# Patient Record
Sex: FEMALE | Race: WHITE | Employment: UNEMPLOYED | URBAN - METROPOLITAN AREA
[De-identification: names, ages, dates, MRNs, and addresses within clinical notes are randomized per-mention and may not be internally consistent; named-entity substitution may affect disease eponyms.]

---

## 2020-11-12 ENCOUNTER — VIRTUAL VISIT (OUTPATIENT)
Dept: PRIMARY CARE CLINIC | Age: 30
End: 2020-11-12
Payer: COMMERCIAL

## 2020-11-12 DIAGNOSIS — U07.1 COVID-19: Primary | ICD-10-CM

## 2020-11-12 DIAGNOSIS — R05.9 COUGH: ICD-10-CM

## 2020-11-12 DIAGNOSIS — J01.00 ACUTE NON-RECURRENT MAXILLARY SINUSITIS: ICD-10-CM

## 2020-11-12 PROCEDURE — 99213 OFFICE O/P EST LOW 20 MIN: CPT | Performed by: NURSE PRACTITIONER

## 2020-11-12 RX ORDER — ALBUTEROL SULFATE 90 UG/1
AEROSOL, METERED RESPIRATORY (INHALATION)
COMMUNITY
End: 2020-11-12 | Stop reason: SDUPTHER

## 2020-11-12 RX ORDER — ALPRAZOLAM 0.25 MG/1
0.25 TABLET ORAL AS NEEDED
COMMUNITY
End: 2020-11-12

## 2020-11-12 RX ORDER — ALBUTEROL SULFATE 90 UG/1
AEROSOL, METERED RESPIRATORY (INHALATION)
Qty: 1 INHALER | Refills: 2 | Status: SHIPPED | OUTPATIENT
Start: 2020-11-12 | End: 2022-01-07

## 2020-11-12 RX ORDER — SERTRALINE HYDROCHLORIDE 50 MG/1
50 TABLET, FILM COATED ORAL DAILY
COMMUNITY
Start: 2020-09-25 | End: 2020-12-28

## 2020-11-12 RX ORDER — HYDROXYZINE 25 MG/1
25 TABLET, FILM COATED ORAL
COMMUNITY
End: 2021-01-18

## 2020-11-12 RX ORDER — ESCITALOPRAM OXALATE 5 MG/1
5 TABLET ORAL DAILY
COMMUNITY
End: 2020-11-12

## 2020-11-12 RX ORDER — PROMETHAZINE HYDROCHLORIDE AND DEXTROMETHORPHAN HYDROBROMIDE 6.25; 15 MG/5ML; MG/5ML
5 SYRUP ORAL
Qty: 150 ML | Refills: 0 | Status: SHIPPED | OUTPATIENT
Start: 2020-11-12 | End: 2020-11-19

## 2020-11-12 RX ORDER — AZITHROMYCIN 250 MG/1
TABLET, FILM COATED ORAL
Qty: 6 TAB | Refills: 0 | Status: SHIPPED | OUTPATIENT
Start: 2020-11-12 | End: 2020-11-17

## 2020-11-12 NOTE — PROGRESS NOTES
HISTORY OF PRESENT ILLNESS  Dav Murguia is a 27 y.o. female presents via telemedicine for sinus infection. Patient reported that she started having symptoms on 10/31 and felt like she had the flu. Patient reported she had body aches and she went to get tested and was positive for COVID. Patient reported that she has continued with sinus pressure and headaches since having COVID. Patient reported that she is continuing to cough with productive sputum that is white. Patient notes significant congestion still almost two weeks since symptoms started and shes feels the sinus pressure is getting worse. Patient reported she has been using ibuprofen for headaches. There were no vitals filed for this visit. Patient Active Problem List   Diagnosis Code    Pregnancy Z34.90     Patient Active Problem List    Diagnosis Date Noted    Pregnancy 12/26/2016     Current Outpatient Medications   Medication Sig Dispense Refill    sertraline (ZOLOFT) 50 mg tablet Take 50 mg by mouth daily.  hydrOXYzine HCL (ATARAX) 25 mg tablet Take 25 mg by mouth three (3) times daily as needed.  promethazine-dextromethorphan (PROMETHAZINE-DM) 6.25-15 mg/5 mL syrup Take 5 mL by mouth every four (4) hours as needed for Cough for up to 7 days.  150 mL 0    azithromycin (ZITHROMAX) 250 mg tablet Take 2 tablets today, then take 1 tablet daily 6 Tab 0    albuterol (Ventolin HFA) 90 mcg/actuation inhaler Use 2 puffs by inhalation every 4 hours as needed 1 Inhaler 2     Allergies   Allergen Reactions    Onion Itching     Red Onions, Makes throat tingly     Past Medical History:   Diagnosis Date    Anxiety     Asthma     Herpes simplex virus (HSV) infection      Past Surgical History:   Procedure Laterality Date    HX OTHER SURGICAL      Tonsillectomy/adnoidectomy    HX TONSILLECTOMY       Family History   Problem Relation Age of Onset    Other Other         Chronic Obstructive pulmonary diseas     Stroke Other  Heart Disease Other     Hypertension Other     Anemia Other     Anxiety Other     Diabetes Other      Social History     Tobacco Use    Smoking status: Never Smoker    Smokeless tobacco: Never Used   Substance Use Topics    Alcohol use: No           Review of Systems   Constitutional: Positive for chills and fever. HENT: Positive for congestion, sinus pain and sore throat. Respiratory: Positive for cough, sputum production and shortness of breath. Gastrointestinal: Negative. Musculoskeletal: Positive for myalgias. Neurological: Positive for headaches. Physical Exam  Constitutional:       Appearance: She is ill-appearing. HENT:      Head: Normocephalic. Nose: Congestion present. Eyes:      Conjunctiva/sclera: Conjunctivae normal.      Pupils: Pupils are equal, round, and reactive to light. Pulmonary:      Effort: Pulmonary effort is normal.   Skin:     General: Skin is warm and dry. Neurological:      Mental Status: She is alert. ASSESSMENT and PLAN  Diagnoses and all orders for this visit:    1. COVID-19  Comments:  Continue to quarentine if having symptoms. Orders:  -     promethazine-dextromethorphan (PROMETHAZINE-DM) 6.25-15 mg/5 mL syrup; Take 5 mL by mouth every four (4) hours as needed for Cough for up to 7 days. -     albuterol (Ventolin HFA) 90 mcg/actuation inhaler; Use 2 puffs by inhalation every 4 hours as needed    2. Acute non-recurrent maxillary sinusitis  Comments:  Pressure in sinuses getting worse. Will start on a zpack incase bacterial.   Patient looks miserable. Orders:  -     azithromycin (ZITHROMAX) 250 mg tablet; Take 2 tablets today, then take 1 tablet daily    3. Cough  -     promethazine-dextromethorphan (PROMETHAZINE-DM) 6.25-15 mg/5 mL syrup; Take 5 mL by mouth every four (4) hours as needed for Cough for up to 7 days.          Magui Willis, who was evaluated through a synchronous (real-time) audio-video encounter, and/or her healthcare decision maker, is aware that it is a billable service, with coverage as determined by her insurance carrier. She provided verbal consent to proceed: Yes, and patient identification was verified. It was conducted pursuant to the emergency declaration under the [address] [place] [address], [address] [place] [address] authority and the [employer] Act. A caregiver was present when appropriate. Ability to conduct physical exam was limited. I was at home. The patient was at home.         Sasha Motta NP

## 2021-01-18 ENCOUNTER — OFFICE VISIT (OUTPATIENT)
Dept: PRIMARY CARE CLINIC | Age: 31
End: 2021-01-18
Payer: COMMERCIAL

## 2021-01-18 VITALS
OXYGEN SATURATION: 96 % | SYSTOLIC BLOOD PRESSURE: 118 MMHG | WEIGHT: 156 LBS | BODY MASS INDEX: 23.11 KG/M2 | RESPIRATION RATE: 18 BRPM | TEMPERATURE: 97.8 F | HEART RATE: 76 BPM | DIASTOLIC BLOOD PRESSURE: 79 MMHG | HEIGHT: 69 IN

## 2021-01-18 DIAGNOSIS — B37.9 YEAST INFECTION: ICD-10-CM

## 2021-01-18 DIAGNOSIS — H66.003 NON-RECURRENT ACUTE SUPPURATIVE OTITIS MEDIA OF BOTH EARS WITHOUT SPONTANEOUS RUPTURE OF TYMPANIC MEMBRANES: Primary | ICD-10-CM

## 2021-01-18 PROCEDURE — 99213 OFFICE O/P EST LOW 20 MIN: CPT | Performed by: NURSE PRACTITIONER

## 2021-01-18 RX ORDER — AMOXICILLIN AND CLAVULANATE POTASSIUM 875; 125 MG/1; MG/1
1 TABLET, FILM COATED ORAL EVERY 12 HOURS
Qty: 20 TAB | Refills: 0 | Status: SHIPPED | OUTPATIENT
Start: 2021-01-18 | End: 2021-01-28

## 2021-01-18 RX ORDER — FLUCONAZOLE 150 MG/1
150 TABLET ORAL DAILY
Qty: 1 TAB | Refills: 0 | Status: SHIPPED | OUTPATIENT
Start: 2021-01-18 | End: 2021-01-19
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
## 2021-01-29 ENCOUNTER — HOSPITAL ENCOUNTER (EMERGENCY)
Age: 31
Discharge: HOME | End: 2021-01-29
Attending: EMERGENCY MEDICINE
Payer: COMMERCIAL

## 2021-01-29 ENCOUNTER — APPOINTMENT (OUTPATIENT)
Dept: CT IMAGING | Age: 31
End: 2021-01-29
Attending: EMERGENCY MEDICINE
Payer: COMMERCIAL

## 2021-01-29 VITALS
WEIGHT: 155 LBS | DIASTOLIC BLOOD PRESSURE: 82 MMHG | HEART RATE: 87 BPM | RESPIRATION RATE: 18 BRPM | SYSTOLIC BLOOD PRESSURE: 109 MMHG | HEIGHT: 69 IN | OXYGEN SATURATION: 98 % | TEMPERATURE: 97.7 F | BODY MASS INDEX: 22.96 KG/M2

## 2021-01-29 DIAGNOSIS — H10.32 ACUTE CONJUNCTIVITIS OF LEFT EYE, UNSPECIFIED ACUTE CONJUNCTIVITIS TYPE: Primary | ICD-10-CM

## 2021-01-29 DIAGNOSIS — H01.004 BLEPHARITIS OF LEFT UPPER EYELID, UNSPECIFIED TYPE: ICD-10-CM

## 2021-01-29 LAB
ANION GAP SERPL CALC-SCNC: 5 MMOL/L (ref 5–15)
BASOPHILS # BLD: 0 K/UL (ref 0–0.1)
BASOPHILS NFR BLD: 0 % (ref 0–1)
BUN SERPL-MCNC: 12 MG/DL (ref 6–20)
BUN/CREAT SERPL: 13 (ref 12–20)
CALCIUM SERPL-MCNC: 8.8 MG/DL (ref 8.5–10.1)
CHLORIDE SERPL-SCNC: 107 MMOL/L (ref 97–108)
CO2 SERPL-SCNC: 28 MMOL/L (ref 21–32)
COMMENT, HOLDF: NORMAL
CREAT SERPL-MCNC: 0.94 MG/DL (ref 0.55–1.02)
DIFFERENTIAL METHOD BLD: ABNORMAL
EOSINOPHIL # BLD: 0.1 K/UL (ref 0–0.4)
EOSINOPHIL NFR BLD: 1 % (ref 0–7)
ERYTHROCYTE [DISTWIDTH] IN BLOOD BY AUTOMATED COUNT: 13.9 % (ref 11.5–14.5)
GLUCOSE SERPL-MCNC: 77 MG/DL (ref 65–100)
HCG UR QL: NEGATIVE
HCT VFR BLD AUTO: 41.1 % (ref 35–47)
HGB BLD-MCNC: 13.5 G/DL (ref 11.5–16)
IMM GRANULOCYTES # BLD AUTO: 0 K/UL (ref 0–0.04)
IMM GRANULOCYTES NFR BLD AUTO: 0 % (ref 0–0.5)
LACTATE SERPL-SCNC: 1 MMOL/L (ref 0.4–2)
LYMPHOCYTES # BLD: 1.1 K/UL (ref 0.8–3.5)
LYMPHOCYTES NFR BLD: 16 % (ref 12–49)
MCH RBC QN AUTO: 32.8 PG (ref 26–34)
MCHC RBC AUTO-ENTMCNC: 32.8 G/DL (ref 30–36.5)
MCV RBC AUTO: 100 FL (ref 80–99)
MONOCYTES # BLD: 0.6 K/UL (ref 0–1)
MONOCYTES NFR BLD: 8 % (ref 5–13)
NEUTS SEG # BLD: 5.3 K/UL (ref 1.8–8)
NEUTS SEG NFR BLD: 75 % (ref 32–75)
NRBC # BLD: 0 K/UL (ref 0–0.01)
NRBC BLD-RTO: 0 PER 100 WBC
PLATELET # BLD AUTO: 135 K/UL (ref 150–400)
PMV BLD AUTO: 10.7 FL (ref 8.9–12.9)
POTASSIUM SERPL-SCNC: 3.9 MMOL/L (ref 3.5–5.1)
RBC # BLD AUTO: 4.11 M/UL (ref 3.8–5.2)
RBC MORPH BLD: ABNORMAL
SAMPLES BEING HELD,HOLD: NORMAL
SODIUM SERPL-SCNC: 140 MMOL/L (ref 136–145)
WBC # BLD AUTO: 7.1 K/UL (ref 3.6–11)

## 2021-01-29 PROCEDURE — 80048 BASIC METABOLIC PNL TOTAL CA: CPT

## 2021-01-29 PROCEDURE — 36415 COLL VENOUS BLD VENIPUNCTURE: CPT

## 2021-01-29 PROCEDURE — 85025 COMPLETE CBC W/AUTO DIFF WBC: CPT

## 2021-01-29 PROCEDURE — 99284 EMERGENCY DEPT VISIT MOD MDM: CPT

## 2021-01-29 PROCEDURE — 96365 THER/PROPH/DIAG IV INF INIT: CPT

## 2021-01-29 PROCEDURE — 70481 CT ORBIT/EAR/FOSSA W/DYE: CPT

## 2021-01-29 PROCEDURE — 81025 URINE PREGNANCY TEST: CPT

## 2021-01-29 PROCEDURE — 74011250636 HC RX REV CODE- 250/636: Performed by: EMERGENCY MEDICINE

## 2021-01-29 PROCEDURE — 83605 ASSAY OF LACTIC ACID: CPT

## 2021-01-29 PROCEDURE — 74011000636 HC RX REV CODE- 636: Performed by: RADIOLOGY

## 2021-01-29 PROCEDURE — 96368 THER/DIAG CONCURRENT INF: CPT

## 2021-01-29 PROCEDURE — 96375 TX/PRO/DX INJ NEW DRUG ADDON: CPT

## 2021-01-29 PROCEDURE — 74011000258 HC RX REV CODE- 258: Performed by: EMERGENCY MEDICINE

## 2021-01-29 RX ORDER — FAMOTIDINE 10 MG/ML
20 INJECTION INTRAVENOUS
Status: COMPLETED | OUTPATIENT
Start: 2021-01-29 | End: 2021-01-29

## 2021-01-29 RX ORDER — DIPHENHYDRAMINE HYDROCHLORIDE 50 MG/ML
INJECTION, SOLUTION INTRAMUSCULAR; INTRAVENOUS
Status: DISCONTINUED
Start: 2021-01-29 | End: 2021-01-29 | Stop reason: HOSPADM

## 2021-01-29 RX ORDER — DIPHENHYDRAMINE HYDROCHLORIDE 50 MG/ML
25 INJECTION, SOLUTION INTRAMUSCULAR; INTRAVENOUS
Status: COMPLETED | OUTPATIENT
Start: 2021-01-29 | End: 2021-01-29

## 2021-01-29 RX ORDER — POLYMYXIN B SULFATE AND TRIMETHOPRIM 1; 10000 MG/ML; [USP'U]/ML
1 SOLUTION OPHTHALMIC EVERY 4 HOURS
Qty: 10 ML | Refills: 0 | Status: SHIPPED | OUTPATIENT
Start: 2021-01-29 | End: 2021-02-05

## 2021-01-29 RX ADMIN — DIPHENHYDRAMINE HYDROCHLORIDE 25 MG: 50 INJECTION, SOLUTION INTRAMUSCULAR; INTRAVENOUS at 14:01

## 2021-01-29 RX ADMIN — IOPAMIDOL 100 ML: 612 INJECTION, SOLUTION INTRAVENOUS at 14:22

## 2021-01-29 RX ADMIN — VANCOMYCIN HYDROCHLORIDE 1750 MG: 5 INJECTION, POWDER, LYOPHILIZED, FOR SOLUTION INTRAVENOUS at 13:23

## 2021-01-29 RX ADMIN — FAMOTIDINE 20 MG: 10 INJECTION INTRAVENOUS at 14:08

## 2021-01-29 RX ADMIN — AMPICILLIN AND SULBACTAM 3 G: 1; 2 INJECTION, POWDER, FOR SOLUTION INTRAMUSCULAR; INTRAVENOUS at 12:46

## 2021-01-29 NOTE — ED TRIAGE NOTES
Pt sent by optometrist for possible orbital cellulitis. Pt states 3 days ago started with irritation and swelling to left eyelid. Eyelid is red and swollen. Eye movement is painful. Pt has been taking antibx for double ear infection for last 8 days.

## 2021-01-29 NOTE — ED NOTES
Patient reports head itching, facial redness noted. Denies any shortness of breath. Vancomycin stopped and line flushed. Dr. Swapna Lamas to bedside.

## 2021-01-29 NOTE — ED PROVIDER NOTES
Patient is a 43-year-old female with history of asthma presenting to emergency department for evaluation of left eye pain and redness with onset 3 days ago. Patient was seen by her optometrist prior to arrival and referred here for evaluation of possible orbital cellulitis. Patient states that for the past 8 days she has been taking Augmentin for treatment of bilateral ear infection, she did have a complication of a left tympanic membrane perforation. She is experiencing pain in the left eyelid with pain with eye movement and left-sided blurred vision. She denies fever, chills, sweats, facial pain, or any other medical complaints at this time. Please note that this dictation was completed with One[employer], the computer voice recognition software.  Quite often unanticipated grammatical, syntax, homophones, and other interpretive errors are inadvertently transcribed by the computer software.  Please disregard these errors.  Please excuse any errors that have escaped final proofreading.              Past Medical History:   Diagnosis Date    Anxiety     Asthma     Headache     Herpes simplex virus (HSV) infection        Past Surgical History:   Procedure Laterality Date    HX OTHER SURGICAL      Tonsillectomy/adnoidectomy    HX TONSILLECTOMY           Family History:   Problem Relation Age of Onset    Other Other         Chronic Obstructive pulmonary diseas     Stroke Other     Heart Disease Other     Hypertension Other     Anemia Other     Anxiety Other     Diabetes Other        Social History     Socioeconomic History    Marital status:      Spouse name: Not on file    Number of children: Not on file    Years of education: Not on file    Highest education level: Not on file   Occupational History    Not on file   Social Needs    Financial resource strain: Not on file    Food insecurity     Worry: Not on file     Inability: Not on file    Transportation needs     Medical: Not on file Non-medical: Not on file   Tobacco Use    Smoking status: Current Every Day Smoker     Packs/day: 6.00     Types: Cigarettes    Smokeless tobacco: Never Used   Substance and Sexual Activity    Alcohol use: No    Drug use: No    Sexual activity: Yes     Partners: Male   Lifestyle    Physical activity     Days per week: Not on file     Minutes per session: Not on file    Stress: Not on file   Relationships    Social connections     Talks on phone: Not on file     Gets together: Not on file     Attends Confucianism service: Not on file     Active member of club or organization: Not on file     Attends meetings of clubs or organizations: Not on file     Relationship status: Not on file    Intimate partner violence     Fear of current or ex partner: Not on file     Emotionally abused: Not on file     Physically abused: Not on file     Forced sexual activity: Not on file   Other Topics Concern    Not on file   Social History Narrative    Not on file         ALLERGIES: Onion    Review of Systems   Constitutional: Negative for chills and fever. HENT: Negative for ear pain and sore throat. Eyes: Positive for pain and visual disturbance. Respiratory: Negative for cough and shortness of breath. Cardiovascular: Negative for chest pain. Gastrointestinal: Negative for abdominal pain. Genitourinary: Negative for flank pain. Musculoskeletal: Negative for back pain. Skin: Negative for color change. Neurological: Negative for dizziness and headaches. Psychiatric/Behavioral: Negative for confusion. Vitals:    01/29/21 1212   BP: 115/80   Pulse: 87   Resp: 18   Temp: 97.7 °F (36.5 °C)   SpO2: 99%   Weight: 70.3 kg (155 lb)   Height: 5' 9\" (1.753 m)            Physical Exam  Vitals signs and nursing note reviewed. Constitutional:       General: She is not in acute distress. Appearance: Normal appearance. She is not ill-appearing. HENT:      Head: Normocephalic and atraumatic.       Right Ear: Hearing, tympanic membrane, ear canal and external ear normal. Tympanic membrane is not erythematous. Left Ear: Hearing, tympanic membrane, ear canal and external ear normal. Tympanic membrane is not erythematous. Mouth/Throat:      Pharynx: Oropharynx is clear. Eyes:      General: Vision grossly intact. Extraocular Movements: Extraocular movements intact. Left eye: Abnormal extraocular motion (EOM intact, pain with eye movement in all directions) present. Conjunctiva/sclera:      Right eye: Right conjunctiva is not injected. No hemorrhage. Left eye: Left conjunctiva is injected. No hemorrhage. Neck:      Musculoskeletal: No neck rigidity. Cardiovascular:      Rate and Rhythm: Normal rate and regular rhythm. Pulmonary:      Effort: Pulmonary effort is normal.      Breath sounds: Normal breath sounds. Abdominal:      Palpations: Abdomen is soft. Tenderness: There is no abdominal tenderness. Musculoskeletal: Normal range of motion. Skin:     General: Skin is warm and dry. Neurological:      General: No focal deficit present. Mental Status: She is alert and oriented to person, place, and time. Psychiatric:         Mood and Affect: Mood normal.          MDM  Number of Diagnoses or Management Options  Acute conjunctivitis of left eye, unspecified acute conjunctivitis type  Blepharitis of left upper eyelid, unspecified type  Diagnosis management comments: Patient is alert, afebrile, vitals stable. Recent treatment for bilateral otitis media and development of left eye pain, swelling, and pain with eye movement over the past 3 days. Seen ophthalmology prior to arrival and referred to emergency department for IV antibiotics and work-up for orbital cellulitis. On exam she has well demarcated erythema to the left upper eyelid with surrounding swelling, extraocular motions intact and painful. Visual acuity checked, within normal limits.   No signs of continued otitis media bilaterally. Differential diagnosis includes orbital cellulitis, preseptal cellulitis, conjunctivitis. Plan to obtain lab work and imaging of the orbits and start IV Unasyn and vancomycin. 2:28 PM  PT developed \"red man\" syndrome with IV vancomycin. Patient given IV Benadryl and Pepcid with relief in symptoms. No signs of anaphylaxis. Plan for continuation of medication once her symptoms resolve at a slower infusion rate. 4:26 PM  Patient lab work unremarkable, no leukocytosis, lactate normal.  CT of the orbits does not show any signs of preseptal or orbital cellulitis, her symptoms are likely consistent with conjunctivitis with likely overlying blepharitis. Patient informed of findings and is discharged home. She has erythromycin ointment already prescribed, which she will continue to the lids, and add on eyedrops for treatment of her treat conjunctivitis. She will follow-up with ophthalmology. Return precautions outlined. All questions answered at this time. Discussed patient with ED attending Mimi Akhtar MD who agrees with current management plan. ED Course as of Jan 29 1623 Fri Jan 29, 2021   1248 Pregnancy test,urine (POC): Negative [EP]   1353 CBC WITH AUTOMATED DIFF(!):    WBC 7.1   RBC 4.11   HGB 13.5   HCT 41.1   .0(!)   MCH 32.8   MCHC 32.8   RDW 13.9   PLATELET 246(!)   MPV 10.7   NRBC 0.0   ABSOLUTE NRBC 0.00   NEUTROPHILS 75   LYMPHOCYTES 16   MONOCYTES 8   EOSINOPHILS 1   BASOPHILS 0   IMMATURE GRANULOCYTES 0   ABS. NEUTROPHILS 5.3   ABS. LYMPHOCYTES 1.1   ABS. MONOCYTES 0.6   ABS. EOSINOPHILS 0.1   ABS. BASOPHILS 0.0   ABS. IMM.  GRANS. 0.0   DF SMEAR SCANNED   RBC COMMENTS MACROCYTOSIS  1+   [EP]   4928 METABOLIC PANEL, BASIC:    Sodium 140   Potassium 3.9   Chloride 107   CO2 28   Anion gap 5   Glucose 77   BUN 12   Creatinine 0.94   BUN/Creatinine ratio 13   GFR est AA >60   GFR est non-AA >60   Calcium 8.8 [EP]   1353 Lactic acid: 1.0 [EP]   9928 IMPRESSION  Left conjunctivitis. CT ORBIT POST FOSSA W CONT [EP]      ED Course User Index  [EP] TAYLOR Meek     4:27 PM  Pt has been reevaluated. There are no new complaints, changes, or physical findings at this time. Medications have been reviewed w/ pt and/or family. Pt and/or family's questions have been answered. Pt and/or family expressed good understanding of the dx/tx/rx and is in agreement with plan of care. Pt instructed and agreed to f/u w/ optho and to return to ED upon further deterioration. Pt is ready for discharge. IMPRESSION:  1. Acute conjunctivitis of left eye, unspecified acute conjunctivitis type    2. Blepharitis of left upper eyelid, unspecified type        PLAN:  1. Current Discharge Medication List      START taking these medications    Details   trimethoprim-polymyxin b (POLYTRIM) ophthalmic solution Administer 1 Drop to both eyes every four (4) hours for 7 days. Qty: 10 mL, Refills: 0           2.    Follow-up Information     Follow up With Specialties Details Why [address] [place] [address]  Schedule an appointment as soon as possible for a visit   [facility]  [phone]    Martinez Yuan NP Nurse Practitioner Schedule an appointment as soon as possible for a visit   [phone]              Return to ED if worse     Procedures

## 2021-02-11 DIAGNOSIS — B37.9 YEAST INFECTION: Primary | ICD-10-CM

## 2021-02-11 RX ORDER — FLUCONAZOLE 150 MG/1
150 TABLET ORAL DAILY
Qty: 1 TAB | Refills: 0 | Status: SHIPPED | OUTPATIENT
Start: 2021-02-11 | End: 2021-02-12

## 2021-03-01 ENCOUNTER — PATIENT MESSAGE (OUTPATIENT)
Dept: PRIMARY CARE CLINIC | Age: 31
End: 2021-03-01

## 2021-03-01 DIAGNOSIS — F41.1 GENERALIZED ANXIETY DISORDER: Primary | ICD-10-CM

## 2021-03-01 RX ORDER — HYDROXYZINE PAMOATE 25 MG/1
25 CAPSULE ORAL
Qty: 30 CAP | Refills: 2 | Status: SHIPPED | OUTPATIENT
Start: 2021-03-01 | End: 2022-11-03 | Stop reason: ALTCHOICE
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
## 2021-03-07 ENCOUNTER — HOSPITAL ENCOUNTER (EMERGENCY)
Age: 31
Discharge: HOME | End: 2021-03-07
Attending: EMERGENCY MEDICINE
Payer: COMMERCIAL

## 2021-03-07 ENCOUNTER — APPOINTMENT (OUTPATIENT)
Dept: CT IMAGING | Age: 31
End: 2021-03-07
Attending: EMERGENCY MEDICINE
Payer: COMMERCIAL

## 2021-03-07 ENCOUNTER — APPOINTMENT (OUTPATIENT)
Dept: GENERAL RADIOLOGY | Age: 31
End: 2021-03-07
Attending: EMERGENCY MEDICINE
Payer: COMMERCIAL

## 2021-03-07 VITALS
DIASTOLIC BLOOD PRESSURE: 71 MMHG | OXYGEN SATURATION: 99 % | TEMPERATURE: 97.4 F | WEIGHT: 157 LBS | RESPIRATION RATE: 16 BRPM | SYSTOLIC BLOOD PRESSURE: 125 MMHG | HEART RATE: 91 BPM | HEIGHT: 69 IN | BODY MASS INDEX: 23.25 KG/M2

## 2021-03-07 DIAGNOSIS — S63.502A SPRAIN OF LEFT WRIST, INITIAL ENCOUNTER: Primary | ICD-10-CM

## 2021-03-07 DIAGNOSIS — R42 VERTIGO: ICD-10-CM

## 2021-03-07 DIAGNOSIS — S06.0X0A CONCUSSION WITHOUT LOSS OF CONSCIOUSNESS, INITIAL ENCOUNTER: ICD-10-CM

## 2021-03-07 PROCEDURE — 73110 X-RAY EXAM OF WRIST: CPT

## 2021-03-07 PROCEDURE — 70450 CT HEAD/BRAIN W/O DYE: CPT

## 2021-03-07 PROCEDURE — 99284 EMERGENCY DEPT VISIT MOD MDM: CPT

## 2021-03-07 RX ORDER — MECLIZINE HYDROCHLORIDE 25 MG/1
25 TABLET ORAL
Qty: 20 TAB | Refills: 0 | Status: SHIPPED | OUTPATIENT
Start: 2021-03-07 | End: 2021-03-17
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
## 2021-03-07 NOTE — ED NOTES
Pt was discharged and given instructions by Dr Edna Ramirez . Pt verbalized good understanding of all discharge instructions,prescriptions and F/U care. All questions answered. Pt in stable condition on discharge.
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
## 2021-05-09 LAB — SARS-COV-2, NAA: NOT DETECTED

## 2021-07-24 ENCOUNTER — APPOINTMENT (OUTPATIENT)
Dept: ULTRASOUND IMAGING | Age: 31
End: 2021-07-24
Attending: FAMILY MEDICINE
Payer: COMMERCIAL

## 2021-07-24 ENCOUNTER — HOSPITAL ENCOUNTER (EMERGENCY)
Age: 31
Discharge: HOME | End: 2021-07-24
Attending: FAMILY MEDICINE
Payer: COMMERCIAL

## 2021-07-24 VITALS
RESPIRATION RATE: 18 BRPM | DIASTOLIC BLOOD PRESSURE: 84 MMHG | TEMPERATURE: 99 F | BODY MASS INDEX: 22.96 KG/M2 | OXYGEN SATURATION: 98 % | WEIGHT: 155 LBS | HEART RATE: 100 BPM | SYSTOLIC BLOOD PRESSURE: 119 MMHG | HEIGHT: 69 IN

## 2021-07-24 DIAGNOSIS — R10.2 PELVIC PAIN: Primary | ICD-10-CM

## 2021-07-24 DIAGNOSIS — Z34.90 INTRAUTERINE PREGNANCY: ICD-10-CM

## 2021-07-24 LAB
ALBUMIN SERPL-MCNC: 3.7 G/DL (ref 3.5–5)
ALBUMIN/GLOB SERPL: 1.2 {RATIO} (ref 1.1–2.2)
ALP SERPL-CCNC: 68 U/L (ref 45–117)
ALT SERPL-CCNC: 28 U/L (ref 12–78)
ANION GAP SERPL CALC-SCNC: 11 MMOL/L (ref 5–15)
APPEARANCE UR: CLEAR
AST SERPL W P-5'-P-CCNC: 16 U/L (ref 15–37)
BACTERIA URNS QL MICRO: ABNORMAL /HPF
BASOPHILS # BLD: 0 K/UL (ref 0–0.1)
BASOPHILS NFR BLD: 0 % (ref 0–1)
BILIRUB SERPL-MCNC: 0.3 MG/DL (ref 0.2–1)
BILIRUB UR QL: NEGATIVE
BUN SERPL-MCNC: 12 MG/DL (ref 6–20)
BUN/CREAT SERPL: 12 (ref 12–20)
CA-I BLD-MCNC: 8.2 MG/DL (ref 8.5–10.1)
CHLORIDE SERPL-SCNC: 106 MMOL/L (ref 97–108)
CO2 SERPL-SCNC: 24 MMOL/L (ref 21–32)
COLOR UR: YELLOW
CREAT SERPL-MCNC: 1.02 MG/DL (ref 0.55–1.02)
DIFFERENTIAL METHOD BLD: ABNORMAL
EOSINOPHIL # BLD: 0.1 K/UL (ref 0–0.4)
EOSINOPHIL NFR BLD: 1 % (ref 0–7)
EPITH CASTS URNS QL MICRO: ABNORMAL /LPF
ERYTHROCYTE [DISTWIDTH] IN BLOOD BY AUTOMATED COUNT: 13.4 % (ref 11.5–14.5)
GLOBULIN SER CALC-MCNC: 3 G/DL (ref 2–4)
GLUCOSE SERPL-MCNC: 104 MG/DL (ref 65–100)
GLUCOSE UR STRIP.AUTO-MCNC: NEGATIVE MG/DL
HCG SERPL-ACNC: 6154.5 MIU/ML (ref 0–6)
HCG UR QL: POSITIVE
HCT VFR BLD AUTO: 37.5 % (ref 35–47)
HGB BLD-MCNC: 12.4 G/DL (ref 11.5–16)
HGB UR QL STRIP: ABNORMAL
IMM GRANULOCYTES # BLD AUTO: 0 K/UL (ref 0–0.04)
IMM GRANULOCYTES NFR BLD AUTO: 0 % (ref 0–0.5)
KETONES UR QL STRIP.AUTO: NEGATIVE MG/DL
LEUKOCYTE ESTERASE UR QL STRIP.AUTO: NEGATIVE
LIPASE SERPL-CCNC: 122 U/L (ref 73–393)
LYMPHOCYTES # BLD: 1.3 K/UL (ref 0.8–3.5)
LYMPHOCYTES NFR BLD: 20 % (ref 12–49)
MCH RBC QN AUTO: 33.3 PG (ref 26–34)
MCHC RBC AUTO-ENTMCNC: 33.1 G/DL (ref 30–36.5)
MCV RBC AUTO: 100.8 FL (ref 80–99)
MONOCYTES # BLD: 0.5 K/UL (ref 0–1)
MONOCYTES NFR BLD: 8 % (ref 5–13)
NEUTS SEG # BLD: 4.5 K/UL (ref 1.8–8)
NEUTS SEG NFR BLD: 71 % (ref 32–75)
NITRITE UR QL STRIP.AUTO: NEGATIVE
PH UR STRIP: 5 [PH] (ref 5–8)
PLATELET # BLD AUTO: 128 K/UL (ref 150–400)
PMV BLD AUTO: 10.2 FL (ref 8.9–12.9)
POTASSIUM SERPL-SCNC: 3.7 MMOL/L (ref 3.5–5.1)
PROT SERPL-MCNC: 6.7 G/DL (ref 6.4–8.2)
PROT UR STRIP-MCNC: NEGATIVE MG/DL
RBC # BLD AUTO: 3.72 M/UL (ref 3.8–5.2)
RBC #/AREA URNS HPF: ABNORMAL /HPF (ref 0–5)
SODIUM SERPL-SCNC: 141 MMOL/L (ref 136–145)
SP GR UR REFRACTOMETRY: 1.02 (ref 1–1.03)
UROBILINOGEN UR QL STRIP.AUTO: 0.1 EU/DL (ref 0.2–1)
WBC # BLD AUTO: 6.4 K/UL (ref 3.6–11)
WBC URNS QL MICRO: ABNORMAL /HPF (ref 0–4)

## 2021-07-24 PROCEDURE — 99283 EMERGENCY DEPT VISIT LOW MDM: CPT

## 2021-07-24 PROCEDURE — 76801 OB US < 14 WKS SINGLE FETUS: CPT

## 2021-07-24 PROCEDURE — 87086 URINE CULTURE/COLONY COUNT: CPT

## 2021-07-24 PROCEDURE — 83690 ASSAY OF LIPASE: CPT

## 2021-07-24 PROCEDURE — 76817 TRANSVAGINAL US OBSTETRIC: CPT

## 2021-07-24 PROCEDURE — 85025 COMPLETE CBC W/AUTO DIFF WBC: CPT

## 2021-07-24 PROCEDURE — 80053 COMPREHEN METABOLIC PANEL: CPT

## 2021-07-24 PROCEDURE — 81001 URINALYSIS AUTO W/SCOPE: CPT

## 2021-07-24 PROCEDURE — 84702 CHORIONIC GONADOTROPIN TEST: CPT

## 2021-07-24 PROCEDURE — 81025 URINE PREGNANCY TEST: CPT

## 2021-07-24 NOTE — ED PROVIDER NOTES
EMERGENCY DEPARTMENT HISTORY AND PHYSICAL EXAM      Date: 7/24/2021  Patient Name: Alverto Rivera    History of Presenting Illness     Chief complaint lower abdominal pain. History Provided By:     HPI: Alverto Rivera, is an extremely pleasant 32 y.o. female presenting to the ED with a chief complaint of left lower abdominal pain and nausea. She states she is pregnant. Her last period was approximately 6 weeks ago. She states her abdominal pain is waxing and waning. It is achy. She denies fevers, chills no rigors. No vomiting or diarrhea. No vaginal discharge. No dysuria but experiencing urinary frequency. No flank pain. She has been eating and drinking less than usual.  She had an episode of lightheadedness however that resolved. No chest pain, shortness of breath or palpitations. No swelling of calves. There are no other complaints, changes, or physical findings at this time. PCP: Dereck Bosch NP    No current facility-administered medications on file prior to encounter. Current Outpatient Medications on File Prior to Encounter   Medication Sig Dispense Refill    sertraline (ZOLOFT) 50 mg tablet Take 1 Tab by mouth daily. 90 Tab 1    albuterol (Ventolin HFA) 90 mcg/actuation inhaler Use 2 puffs by inhalation every 4 hours as needed 1 Inhaler 2    hydrOXYzine pamoate (VISTARIL) 25 mg capsule Take 1 Cap by mouth three (3) times daily as needed for Anxiety.  (Patient not taking: Reported on 7/24/2021) 30 Cap 2       Past History     Past Medical History:  Past Medical History:   Diagnosis Date    Anxiety     Asthma     Headache     Herpes simplex virus (HSV) infection        Past Surgical History:  Past Surgical History:   Procedure Laterality Date    HX OTHER SURGICAL      Tonsillectomy/adnoidectomy    HX TONSILLECTOMY         Family History:  Family History   Problem Relation Age of Onset    Other Other         Chronic Obstructive pulmonary diseas     Stroke Other  Heart Disease Other     Hypertension Other     Anemia Other     Anxiety Other     Diabetes Other        Social History:  Social History     Tobacco Use    Smoking status: Current Every Day Smoker     Packs/day: 0.25     Types: Cigarettes    Smokeless tobacco: Never Used   Vaping Use    Vaping Use: Never used   Substance Use Topics    Alcohol use: No    Drug use: No       Allergies: Allergies   Allergen Reactions    Onion Itching     Red Onions, Makes throat tingly    Vancomycin Itching     Itching and facial redness           Review of Systems     Review of Systems   Constitutional: Positive for appetite change. Negative for activity change, chills, fatigue and fever. HENT: Negative for congestion and sore throat. Eyes: Negative for photophobia and visual disturbance. Respiratory: Negative for cough, shortness of breath and wheezing. Cardiovascular: Negative for chest pain, palpitations and leg swelling. Gastrointestinal: Positive for abdominal pain. Negative for diarrhea, nausea and vomiting. Endocrine: Negative for cold intolerance and heat intolerance. Genitourinary: Positive for frequency. Musculoskeletal: Negative for gait problem and joint swelling. Skin: Negative for color change and rash. Neurological: Negative for dizziness and headaches. Physical Exam     Physical Exam  Constitutional:       Appearance: She is well-developed. HENT:      Head: Normocephalic and atraumatic. Mouth/Throat:      Pharynx: Oropharynx is clear. Comments: Dry mucous membranes  Eyes:      Conjunctiva/sclera: Conjunctivae normal.      Pupils: Pupils are equal, round, and reactive to light. Cardiovascular:      Rate and Rhythm: Normal rate and regular rhythm. Heart sounds: No murmur heard. Pulmonary:      Effort: No respiratory distress. Breath sounds: No stridor. No wheezing, rhonchi or rales. Abdominal:      General: There is no distension.       Tenderness: There is no abdominal tenderness. There is no rebound. Comments: Mild left lower quadrant tenderness with palpation. No rebound nor guarding. Musculoskeletal:      Cervical back: Normal range of motion and neck supple. Skin:     General: Skin is warm and dry. Neurological:      General: No focal deficit present. Mental Status: She is alert and oriented to person, place, and time. Psychiatric:         Mood and Affect: Mood normal.         Behavior: Behavior normal.         Lab and Diagnostic Study Results     Labs -     Recent Results (from the past 12 hour(s))   URINALYSIS W/ RFLX MICROSCOPIC    Collection Time: 07/24/21  7:15 PM   Result Value Ref Range    Color Yellow      Appearance Clear Clear      Specific gravity 1.020 1.003 - 1.030      pH (UA) 5.0 5.0 - 8.0      Protein Negative Negative mg/dL    Glucose Negative Negative mg/dL    Ketone Negative Negative mg/dL    Bilirubin Negative Negative      Blood Small (A) Negative      Urobilinogen 0.1 (L) 0.2 - 1.0 EU/dL    Nitrites Negative Negative      Leukocyte Esterase Negative Negative     URINE MICROSCOPIC    Collection Time: 07/24/21  7:15 PM   Result Value Ref Range    WBC 0-4 0 - 4 /hpf    RBC 0-5 0 - 5 /hpf    Epithelial cells Few Few /lpf    Bacteria 1+ (A) Negative /hpf   HCG URINE, QL    Collection Time: 07/24/21  7:25 PM   Result Value Ref Range    HCG urine, QL Positive (A) Negative     CBC WITH AUTOMATED DIFF    Collection Time: 07/24/21  7:45 PM   Result Value Ref Range    WBC 6.4 3.6 - 11.0 K/uL    RBC 3.72 (L) 3.80 - 5.20 M/uL    HGB 12.4 11.5 - 16.0 g/dL    HCT 37.5 35.0 - 47.0 %    .8 (H) 80.0 - 99.0 FL    MCH 33.3 26.0 - 34.0 PG    MCHC 33.1 30.0 - 36.5 g/dL    RDW 13.4 11.5 - 14.5 %    PLATELET 909 (L) 497 - 400 K/uL    MPV 10.2 8.9 - 12.9 FL    NEUTROPHILS 71 32 - 75 %    LYMPHOCYTES 20 12 - 49 %    MONOCYTES 8 5 - 13 %    EOSINOPHILS 1 0 - 7 %    BASOPHILS 0 0 - 1 %    IMMATURE GRANULOCYTES 0 0.0 - 0.5 %    ABS. NEUTROPHILS 4.5 1.8 - 8.0 K/UL    ABS. LYMPHOCYTES 1.3 0.8 - 3.5 K/UL    ABS. MONOCYTES 0.5 0.0 - 1.0 K/UL    ABS. EOSINOPHILS 0.1 0.0 - 0.4 K/UL    ABS. BASOPHILS 0.0 0.0 - 0.1 K/UL    ABS. IMM. GRANS. 0.0 0.00 - 0.04 K/UL    DF AUTOMATED     METABOLIC PANEL, COMPREHENSIVE    Collection Time: 07/24/21  7:45 PM   Result Value Ref Range    Sodium 141 136 - 145 mmol/L    Potassium 3.7 3.5 - 5.1 mmol/L    Chloride 106 97 - 108 mmol/L    CO2 24 21 - 32 mmol/L    Anion gap 11 5 - 15 mmol/L    Glucose 104 (H) 65 - 100 mg/dL    BUN 12 6 - 20 mg/dL    Creatinine 1.02 0.55 - 1.02 mg/dL    BUN/Creatinine ratio 12 12 - 20      GFR est AA >60 >60 ml/min/1.73m2    GFR est non-AA >60 >60 ml/min/1.73m2    Calcium 8.2 (L) 8.5 - 10.1 mg/dL    Bilirubin, total 0.3 0.2 - 1.0 mg/dL    AST (SGOT) 16 15 - 37 U/L    ALT (SGPT) 28 12 - 78 U/L    Alk. phosphatase 68 45 - 117 U/L    Protein, total 6.7 6.4 - 8.2 g/dL    Albumin 3.7 3.5 - 5.0 g/dL    Globulin 3.0 2.0 - 4.0 g/dL    A-G Ratio 1.2 1.1 - 2.2     LIPASE    Collection Time: 07/24/21  7:45 PM   Result Value Ref Range    Lipase 122 73 - 393 U/L   BETA HCG, QT    Collection Time: 07/24/21  7:45 PM   Result Value Ref Range    Beta HCG, QT 6,154.5 (H) 0 - 6 mIU/mL       Radiologic Studies -   @lastxrresult@  CT Results  (Last 48 hours)    None        CXR Results  (Last 48 hours)    None            Medical Decision Making   - I am the first provider for this patient. - I reviewed the vital signs, available nursing notes, past medical history, past surgical history, family history and social history. - Initial assessment performed. The patients presenting problems have been discussed, and they are in agreement with the care plan formulated and outlined with them. I have encouraged them to ask questions as they arise throughout their visit. Vital Signs-Reviewed the patient's vital signs.   Patient Vitals for the past 12 hrs:   Temp Pulse Resp BP SpO2   07/24/21 1913 99 °F (37.2 °C) 100 18 119/84 98 %         ED Course/ Provider Notes (Medical Decision Making):     Patient presented to the emergency department with a chief complaint of abdominal pain, dizziness, early pregnancy. On exam she is nontoxic. Heart rate 100 but this settles into the 70s when she is in her room. She is afebrile. Blood pressure 119/84. Oxygen saturation 98% on room air. Mild left lower quadrant tenderness with palpation. No rebound or guarding. Dry mucous membranes. Laboratory work without marked abnormality. She does have a mild thrombocytopenia of 128 of unknown chronicity. 1+ bacteria and few epithelial cells. Urine sent for culture. hCG quant is 6154. Transvaginal ultrasound demonstrates 1. Small gestational sac within the endometrial cavity. This corresponds to a 5  week 0 day gestation but viability cannot be confirmed without a fetal pole and  heart motion. Recommend short-term follow-up quantitative beta-hCG and repeat  endovaginal ultrasound. 2. Peritoneal fluid. Ovaries normal with no adnexal mass. I discussed these findings in detail with the patient. She is feeling much better after IV fluids. She understands the importance of following up with her obstetrician for further follow-up regarding the aforementioned findings. She is in agreements with this plan. She was discharged home feeling better. Sheri Canas was given a thorough list of signs and symptoms that would warrant an immediate return to the emergency department. Otherwise Sheri Canas will follow up with PCP. Procedures   Medical Decision Makingedical Decision Making  Performed by: Raegan Cervantes DO  Procedures  None       Disposition   Disposition:     Home     All of the diagnostic tests were reviewed and questions answered. Diagnosis, care plan and treatment options were discussed. The patient understands the instructions and will follow up as directed.  The patients results have been reviewed with them. They have been counseled regarding their diagnosis. The patient verbally convey understanding and agreement of the signs, symptoms, diagnosis, treatment and prognosis and additionally agrees to follow up as recommended with their PCP in 24 - 48 hours. They also agree with the care-plan and convey that all of their questions have been answered. I have also put together some discharge instructions for them that include: 1) educational information regarding their diagnosis, 2) how to care for their diagnosis at home, as well a 3) list of reasons why they would want to return to the ED prior to their follow-up appointment, should their condition change. DISCHARGE PLAN:    1. Current Discharge Medication List      CONTINUE these medications which have NOT CHANGED    Details   sertraline (ZOLOFT) 50 mg tablet Take 1 Tab by mouth daily. Qty: 90 Tab, Refills: 1      albuterol (Ventolin HFA) 90 mcg/actuation inhaler Use 2 puffs by inhalation every 4 hours as needed  Qty: 1 Inhaler, Refills: 2    Associated Diagnoses: COVID-19      hydrOXYzine pamoate (VISTARIL) 25 mg capsule Take 1 Cap by mouth three (3) times daily as needed for Anxiety. Qty: 30 Cap, Refills: 2    Associated Diagnoses: Generalized anxiety disorder               2.   Follow-up Information     Follow up With Specialties Details Why Contact Info    Rosendo Ramírez NP Nurse Practitioner Schedule an appointment as soon as possible for a visit   [phone] [facility] Obstetrics & Gynecology Schedule an appointment as soon as possible for a visit   [address] [facility] [id number] [phone]            3. Return to ED if worse       4. Discharge Medication List as of 7/24/2021  9:52 PM            Diagnosis     Clinical Impression:    1. Pelvic pain    2.  Intrauterine pregnancy        Attestations:    Yudy Chahal, DO    Please note that this dictation was completed with Dragon, the computer voice recognition software. Quite often unanticipated grammatical, syntax, homophones, and other interpretive errors are inadvertently transcribed by the computer software. Please disregard these errors. Please excuse any errors that have escaped final proofreading. Thank you.

## 2021-07-24 NOTE — Clinical Note
[facility] EMERGENCY DEPARTMENT  [address] [id number]  [phone]    Work/School Note    Date: 7/24/2021    To Whom It May concern:      Otilio Allison was seen and treated today in the emergency room by the following provider(s):  Attending Provider: Pratima Friend, Kiran Bond is excused from work/school on 07/24/21. She is clear to return to work/school on 07/25/21.         Sincerely,          Sulema Rivera, DO

## 2021-07-24 NOTE — ED TRIAGE NOTES
Pt c/o lightheadedness, abdominal pain in LLQ. Denies nausea, vomiting, urinary sx. Pt is 5-6 weeks pregnant, 4th pregnancy. Hasn't experienced sx like this before.

## 2021-07-26 LAB
BACTERIA SPEC CULT: NORMAL
COLONY COUNT,CNT: NORMAL
SPECIAL REQUESTS,SREQ: NORMAL

## 2021-08-02 LAB
CHLAMYDIA, EXTERNAL: NEGATIVE
N. GONORRHEA, EXTERNAL: NEGATIVE

## 2021-08-30 LAB
ANTIBODY SCREEN, EXTERNAL: NEGATIVE
HBSAG, EXTERNAL: NEGATIVE
HCT, EXTERNAL: 36.5
HGB, EXTERNAL: 12.6
HIV, EXTERNAL: NEGATIVE
PLATELET CNT,   EXTERNAL: 146
RPR, EXTERNAL: NORMAL
RUBELLA, EXTERNAL: NORMAL

## 2021-10-13 ENCOUNTER — OFFICE VISIT (OUTPATIENT)
Dept: CARDIOLOGY CLINIC | Age: 31
End: 2021-10-13
Payer: COMMERCIAL

## 2021-10-13 VITALS
OXYGEN SATURATION: 99 % | DIASTOLIC BLOOD PRESSURE: 70 MMHG | SYSTOLIC BLOOD PRESSURE: 100 MMHG | HEART RATE: 85 BPM | HEIGHT: 69 IN | BODY MASS INDEX: 23.11 KG/M2 | WEIGHT: 156 LBS

## 2021-10-13 DIAGNOSIS — I10 ESSENTIAL HYPERTENSION: Primary | ICD-10-CM

## 2021-10-13 DIAGNOSIS — R00.2 PALPITATIONS: ICD-10-CM

## 2021-10-13 DIAGNOSIS — R07.9 CHEST PAIN, UNSPECIFIED TYPE: ICD-10-CM

## 2021-10-13 PROCEDURE — 93000 ELECTROCARDIOGRAM COMPLETE: CPT | Performed by: SPECIALIST

## 2021-10-13 PROCEDURE — 99204 OFFICE O/P NEW MOD 45 MIN: CPT | Performed by: SPECIALIST

## 2021-10-13 RX ORDER — PYRIDOXINE HCL (VITAMIN B6) 100 MG
100 TABLET ORAL DAILY
COMMUNITY
End: 2022-03-28

## 2021-10-13 NOTE — PROGRESS NOTES
Kerri Ponce is a 32 y.o. female    Visit Vitals  /70 (BP 1 Location: Left upper arm, BP Patient Position: Sitting, BP Cuff Size: Adult)   Pulse 85   Ht 5' 9\" (1.753 m)   Wt 156 lb (70.8 kg)   LMP 02/21/2021 (Approximate)   SpO2 99%   BMI 23.04 kg/m²       Chief Complaint   Patient presents with    Other     HX HEART DISEASE       Chest pain NO  SOB YES  Dizziness YES  Swelling NO  Recent hospital visit NO  Refills NO  COVID VACCINE STATUS NO

## 2021-10-13 NOTE — PROGRESS NOTES
Guera Sommers MD. [facility]              Patient: Khushi Fernando  : 1990      Today's Date: 10/13/2021          HISTORY OF PRESENT ILLNESS:     History of Present Illness:    Referred by OB (Dr. Gildardo Abarca)   She is 16 weeks pregnant. Hx of palpitations and has had more palpitations lately. Says her 3 kids have heart problems (leaky valve, PFO, dextrocardia? Based on what she says). She also has atypical CP - random - not exertional.  Also has ALLISON and lightheaded at times. PAST MEDICAL HISTORY:     Past Medical History:   Diagnosis Date    Anxiety     Asthma     Headache     Herpes simplex virus (HSV) infection        Past Surgical History:   Procedure Laterality Date    HX OTHER SURGICAL      Tonsillectomy/adnoidectomy    HX TONSILLECTOMY             MEDICATIONS:     Current Outpatient Medications   Medication Sig Dispense Refill    pyridoxine, vitamin B6, (Vitamin B-6) 100 mg tablet Take 100 mg by mouth daily.  doxylamine succinate (UNISOM) 25 mg tablet Take 25 mg by mouth nightly as needed.  sertraline (ZOLOFT) 50 mg tablet Take 1 Tab by mouth daily. 90 Tab 1    hydrOXYzine pamoate (VISTARIL) 25 mg capsule Take 1 Cap by mouth three (3) times daily as needed for Anxiety.  30 Cap 2    albuterol (Ventolin HFA) 90 mcg/actuation inhaler Use 2 puffs by inhalation every 4 hours as needed 1 Inhaler 2       Allergies   Allergen Reactions    Onion Itching     Red Onions, Makes throat tingly    Vancomycin Itching     Itching and facial redness               SOCIAL HISTORY:     Social History     Tobacco Use    Smoking status: Former Smoker     Packs/day: 0.25    Smokeless tobacco: Never Used   Vaping Use    Vaping Use: Never used   Substance Use Topics    Alcohol use: Not Currently    Drug use: No         FAMILY HISTORY:     Family History   Problem Relation Age of Onset    Other Other         Chronic Obstructive pulmonary diseas     Stroke Other     Heart Disease Other     Hypertension Other     Anemia Other     Anxiety Other     Diabetes Other     Heart Disease Mother            REVIEW OF SYMPTOMS:     Review of Symptoms:  Constitutional: Negative for fever, chills  HEENT: Negative for nosebleeds, tinnitus, and vision changes. Respiratory: Negative for cough, wheezing  Cardiovascular: Negative for orthopnea, claudication, syncope, and PND. Gastrointestinal: Negative for abdominal pain, diarrhea, melena. Genitourinary: Negative for dysuria  Musculoskeletal: Negative for myalgias. Skin: Negative for rash  Heme: No problems bleeding. Neurological: Negative for speech change and focal weakness. PHYSICAL EXAM:     Physical Exam:  Visit Vitals  /70 (BP 1 Location: Left upper arm, BP Patient Position: Sitting, BP Cuff Size: Adult)   Pulse 85   Ht 5' 9\" (1.753 m)   Wt 156 lb (70.8 kg)   LMP 02/21/2021 (Approximate)   SpO2 99%   BMI 23.04 kg/m²     Patient appears generally well, mood and affect are appropriate and pleasant. HEENT:  Hearing intact, non-icteric, normocephalic, atraumatic. Neck Exam: Supple, No JVD or carotid bruits. Lung Exam: Clear to auscultation, even breath sounds. Cardiac Exam: Regular rate and rhythm with no murmur or rub  Abdomen: Soft, non-tender, normal bowel sounds. No bruits or masses. Extremities: Moves all ext well. No lower extremity edema.   MSKTL: Overall good ROM ext  Skin: No significant rashes  Psych: Appropriate affect  Neuro - Grossly intact      LABS / OTHER STUDIES reviewed:       Lab Results   Component Value Date/Time    Sodium 141 07/24/2021 07:45 PM    Potassium 3.7 07/24/2021 07:45 PM    Chloride 106 07/24/2021 07:45 PM    CO2 24 07/24/2021 07:45 PM    Anion gap 11 07/24/2021 07:45 PM    Glucose 104 (H) 07/24/2021 07:45 PM    BUN 12 07/24/2021 07:45 PM    Creatinine 1.02 07/24/2021 07:45 PM    BUN/Creatinine ratio 12 07/24/2021 07:45 PM    GFR est AA >60 07/24/2021 07:45 PM    GFR est non-AA >60 07/24/2021 07:45 PM    Calcium 8.2 (L) 07/24/2021 07:45 PM    Bilirubin, total 0.3 07/24/2021 07:45 PM    Alk. phosphatase 68 07/24/2021 07:45 PM    Protein, total 6.7 07/24/2021 07:45 PM    Albumin 3.7 07/24/2021 07:45 PM    Globulin 3.0 07/24/2021 07:45 PM    A-G Ratio 1.2 07/24/2021 07:45 PM    ALT (SGPT) 28 07/24/2021 07:45 PM    AST (SGOT) 16 07/24/2021 07:45 PM     Lab Results   Component Value Date/Time    WBC 6.4 07/24/2021 07:45 PM    HGB 12.4 07/24/2021 07:45 PM    HCT 37.5 07/24/2021 07:45 PM    PLATELET 670 (L) 55/34/4057 07:45 PM    .8 (H) 07/24/2021 07:45 PM    Hgb, External 12.6 05/25/2016 12:00 AM    Hct, External 37.5 05/25/2016 12:00 AM    Platelet cnt., External 152 05/25/2016 12:00 AM             CARDIAC DIAGNOSTICS:     Cardiac Evaluation Includes:  I reviewed the results below. EKG 10/13/21 - NSR, normal       ASSESSMENT AND PLAN:     Assessment and Plan:  1) Atypical CP   - symptoms atypical for angina. - Check check TSH, lipids   - check an echo and treadmill stress test     2) Palpitations   - palpitations worse with pregnancy   - Heart racing happens every few days  - Will check a 30 day monitor     3) 16 weeks pregnant     4) Phone FU. See me as needed. Has 3 kids. . Verta Holstein, MD, [facility] [address] [place] [address], [facility] [address]  Ph: [phone]   Ph [phone]      ADDENDUM   11/30/2021  Event monitor 10/26-11/24/21 - wore monitor 5 days. NSR, Avg HR 80 bpm.  1% PVC burden. Symptoms correlated with sinus with PVC's. I see her in a few days. ADDENDUM   12/6/2021    Echo 12/6/21 - LVEF 60%, normal   Treadmill stress test 12/6/21 - walked 9 min, 10 METS, normal study     Will have nurse call with normal results.

## 2021-10-13 NOTE — PROGRESS NOTES
Orders for Check an echo and treadmill stress test when possible    per Dr. Royal Hughes VO.   Dx: cp, palps

## 2021-10-14 LAB
HCT, EXTERNAL: 34.9
HGB, EXTERNAL: 11.9
PLATELET CNT,   EXTERNAL: 141

## 2021-10-26 ENCOUNTER — HOSPITAL ENCOUNTER (OUTPATIENT)
Dept: PERINATAL CARE | Age: 31
Discharge: HOME | End: 2021-10-26
Attending: OBSTETRICS & GYNECOLOGY
Payer: COMMERCIAL

## 2021-10-26 PROCEDURE — 76811 OB US DETAILED SNGL FETUS: CPT | Performed by: OBSTETRICS & GYNECOLOGY

## 2021-12-06 ENCOUNTER — ANCILLARY PROCEDURE (OUTPATIENT)
Dept: CARDIOLOGY CLINIC | Age: 31
End: 2021-12-06
Payer: COMMERCIAL

## 2021-12-06 ENCOUNTER — ANCILLARY PROCEDURE (OUTPATIENT)
Dept: CARDIOLOGY CLINIC | Age: 31
End: 2021-12-06

## 2021-12-06 VITALS
SYSTOLIC BLOOD PRESSURE: 102 MMHG | DIASTOLIC BLOOD PRESSURE: 58 MMHG | HEIGHT: 69 IN | BODY MASS INDEX: 23.11 KG/M2 | WEIGHT: 156 LBS

## 2021-12-06 VITALS — BODY MASS INDEX: 23.11 KG/M2 | WEIGHT: 156 LBS | HEIGHT: 69 IN

## 2021-12-06 DIAGNOSIS — R07.9 CHEST PAIN, UNSPECIFIED TYPE: ICD-10-CM

## 2021-12-06 DIAGNOSIS — R00.2 PALPITATIONS: ICD-10-CM

## 2021-12-06 DIAGNOSIS — I10 ESSENTIAL HYPERTENSION: ICD-10-CM

## 2021-12-06 LAB
ECHO AO ASC DIAM: 2.83 CM
ECHO AO ROOT DIAM: 2.97 CM
ECHO AV AREA PEAK VELOCITY: 3.07 CM2
ECHO AV AREA VTI: 3.17 CM2
ECHO AV AREA/BSA PEAK VELOCITY: 1.7 CM2/M2
ECHO AV AREA/BSA VTI: 1.7 CM2/M2
ECHO AV MEAN GRADIENT: 4.69 MMHG
ECHO AV PEAK GRADIENT: 8.88 MMHG
ECHO AV PEAK VELOCITY: 149 CM/S
ECHO AV VTI: 26.87 CM
ECHO EST RA PRESSURE: 3 MMHG
ECHO IVC PROX: 1.91 CM
ECHO LA AREA 4C: 19.31 CM2
ECHO LA MAJOR AXIS: 3.56 CM
ECHO LA MINOR AXIS: 1.91 CM
ECHO LA VOL 2C: 64.39 ML (ref 22–52)
ECHO LA VOL 4C: 57.3 ML (ref 22–52)
ECHO LA VOL BP: 66.2 ML (ref 22–52)
ECHO LA VOL/BSA BIPLANE: 35.59 ML/M2 (ref 16–28)
ECHO LA VOLUME INDEX A2C: 34.62 ML/M2 (ref 16–28)
ECHO LA VOLUME INDEX A4C: 30.81 ML/M2 (ref 16–28)
ECHO LV E' LATERAL VELOCITY: 9.77 CM/S
ECHO LV E' SEPTAL VELOCITY: 8.17 CM/S
ECHO LV EDV A2C: 122.83 ML
ECHO LV EDV A4C: 123.07 ML
ECHO LV EDV BP: 124.08 ML (ref 56–104)
ECHO LV EDV INDEX A4C: 66.2 ML/M2
ECHO LV EDV INDEX BP: 66.7 ML/M2
ECHO LV EDV NDEX A2C: 66 ML/M2
ECHO LV EJECTION FRACTION A2C: 55 PERCENT
ECHO LV EJECTION FRACTION A4C: 64 PERCENT
ECHO LV EJECTION FRACTION BIPLANE: 60 PERCENT (ref 55–100)
ECHO LV ESV A2C: 55.31 ML
ECHO LV ESV A4C: 44.01 ML
ECHO LV ESV BP: 49.67 ML (ref 19–49)
ECHO LV ESV INDEX A2C: 29.7 ML/M2
ECHO LV ESV INDEX A4C: 23.7 ML/M2
ECHO LV ESV INDEX BP: 26.7 ML/M2
ECHO LV INTERNAL DIMENSION DIASTOLIC: 5.06 CM (ref 3.9–5.3)
ECHO LV INTERNAL DIMENSION SYSTOLIC: 3.2 CM
ECHO LV IVSD: 0.77 CM (ref 0.6–0.9)
ECHO LV MASS 2D: 137.5 G (ref 67–162)
ECHO LV MASS INDEX 2D: 73.9 G/M2 (ref 43–95)
ECHO LV POSTERIOR WALL DIASTOLIC: 0.82 CM (ref 0.6–0.9)
ECHO LVOT DIAM: 2.25 CM
ECHO LVOT PEAK GRADIENT: 5.27 MMHG
ECHO LVOT PEAK VELOCITY: 114.74 CM/S
ECHO LVOT SV: 85.2 ML
ECHO LVOT VTI: 21.35 CM
ECHO MV A VELOCITY: 62.44 CM/S
ECHO MV E DECELERATION TIME (DT): 215.39 MS
ECHO MV E VELOCITY: 47.71 CM/S
ECHO MV E/A RATIO: 0.76
ECHO MV E/E' LATERAL: 4.88
ECHO MV E/E' RATIO (AVERAGED): 5.36
ECHO MV E/E' SEPTAL: 5.84
ECHO MV MAX VELOCITY: 64.07 CM/S
ECHO MV MEAN GRADIENT: 0.96 MMHG
ECHO MV PEAK GRADIENT: 1.64 MMHG
ECHO MV PRESSURE HALF TIME (PHT): 62.46 MS
ECHO MV VTI: 13.71 CM
ECHO RA AREA 4C: 13.16 CM2
ECHO RIGHT VENTRICULAR SYSTOLIC PRESSURE (RVSP): 23.59 MMHG
ECHO RV TAPSE: 2.59 CM (ref 1.5–2)
ECHO TV REGURGITANT MAX VELOCITY: 226.9 CM/S
ECHO TV REGURGITANT PEAK GRADIENT: 20.59 MMHG
STRESS ANGINA INDEX: 0
STRESS BASELINE DIAS BP: 74 MMHG
STRESS BASELINE HR: 90 BPM
STRESS BASELINE SYS BP: 116 MMHG
STRESS ESTIMATED WORKLOAD: 10.1 METS
STRESS EXERCISE DUR MIN: NORMAL
STRESS O2 SAT PEAK: 98 %
STRESS O2 SAT REST: 99 %
STRESS PEAK DIAS BP: 82 MMHG
STRESS PEAK SYS BP: 140 MMHG
STRESS PERCENT HR ACHIEVED: 90 %
STRESS POST PEAK HR: 171 BPM
STRESS RATE PRESSURE PRODUCT: NORMAL BPM*MMHG
STRESS ST DEPRESSION: 0 MM
STRESS ST ELEVATION: 0 MM
STRESS TARGET HR: 189 BPM

## 2021-12-06 PROCEDURE — 93306 TTE W/DOPPLER COMPLETE: CPT | Performed by: SPECIALIST

## 2021-12-06 PROCEDURE — 93015 CV STRESS TEST SUPVJ I&R: CPT | Performed by: SPECIALIST

## 2021-12-28 PROCEDURE — 93005 ELECTROCARDIOGRAM TRACING: CPT

## 2021-12-28 PROCEDURE — 99283 EMERGENCY DEPT VISIT LOW MDM: CPT

## 2021-12-29 ENCOUNTER — APPOINTMENT (OUTPATIENT)
Dept: CT IMAGING | Age: 31
End: 2021-12-29
Attending: EMERGENCY MEDICINE
Payer: COMMERCIAL

## 2021-12-29 ENCOUNTER — HOSPITAL ENCOUNTER (EMERGENCY)
Age: 31
Discharge: HOME | End: 2021-12-29
Attending: EMERGENCY MEDICINE
Payer: COMMERCIAL

## 2021-12-29 VITALS
HEIGHT: 69 IN | DIASTOLIC BLOOD PRESSURE: 61 MMHG | TEMPERATURE: 98.4 F | SYSTOLIC BLOOD PRESSURE: 94 MMHG | WEIGHT: 163 LBS | BODY MASS INDEX: 24.14 KG/M2 | OXYGEN SATURATION: 99 % | RESPIRATION RATE: 17 BRPM | HEART RATE: 83 BPM

## 2021-12-29 DIAGNOSIS — Z3A.27 27 WEEKS GESTATION OF PREGNANCY: ICD-10-CM

## 2021-12-29 DIAGNOSIS — R06.02 SHORTNESS OF BREATH: Primary | ICD-10-CM

## 2021-12-29 LAB
ALBUMIN SERPL-MCNC: 2.7 G/DL (ref 3.5–5)
ALBUMIN/GLOB SERPL: 0.8 {RATIO} (ref 1.1–2.2)
ALP SERPL-CCNC: 90 U/L (ref 45–117)
ALT SERPL-CCNC: 23 U/L (ref 12–78)
ANION GAP SERPL CALC-SCNC: 6 MMOL/L (ref 5–15)
AST SERPL-CCNC: 17 U/L (ref 15–37)
ATRIAL RATE: 86 BPM
BASOPHILS # BLD: 0 K/UL (ref 0–0.1)
BASOPHILS NFR BLD: 0 % (ref 0–1)
BILIRUB SERPL-MCNC: 0.3 MG/DL (ref 0.2–1)
BUN SERPL-MCNC: 7 MG/DL (ref 6–20)
BUN/CREAT SERPL: 13 (ref 12–20)
CALCIUM SERPL-MCNC: 8 MG/DL (ref 8.5–10.1)
CALCULATED P AXIS, ECG09: 28 DEGREES
CALCULATED R AXIS, ECG10: 5 DEGREES
CALCULATED T AXIS, ECG11: 13 DEGREES
CHLORIDE SERPL-SCNC: 109 MMOL/L (ref 97–108)
CO2 SERPL-SCNC: 26 MMOL/L (ref 21–32)
CREAT SERPL-MCNC: 0.52 MG/DL (ref 0.55–1.02)
D DIMER PPP FEU-MCNC: 1.45 MG/L FEU (ref 0–0.65)
DIAGNOSIS, 93000: NORMAL
DIFFERENTIAL METHOD BLD: ABNORMAL
EOSINOPHIL # BLD: 0.1 K/UL (ref 0–0.4)
EOSINOPHIL NFR BLD: 1 % (ref 0–7)
ERYTHROCYTE [DISTWIDTH] IN BLOOD BY AUTOMATED COUNT: 13.6 % (ref 11.5–14.5)
GLOBULIN SER CALC-MCNC: 3.4 G/DL (ref 2–4)
GLUCOSE SERPL-MCNC: 94 MG/DL (ref 65–100)
HCT VFR BLD AUTO: 31.4 % (ref 35–47)
HGB BLD-MCNC: 10.4 G/DL (ref 11.5–16)
IMM GRANULOCYTES # BLD AUTO: 0.1 K/UL (ref 0–0.04)
IMM GRANULOCYTES NFR BLD AUTO: 1 % (ref 0–0.5)
LYMPHOCYTES # BLD: 1.3 K/UL (ref 0.8–3.5)
LYMPHOCYTES NFR BLD: 16 % (ref 12–49)
MCH RBC QN AUTO: 34 PG (ref 26–34)
MCHC RBC AUTO-ENTMCNC: 33.1 G/DL (ref 30–36.5)
MCV RBC AUTO: 102.6 FL (ref 80–99)
MONOCYTES # BLD: 0.8 K/UL (ref 0–1)
MONOCYTES NFR BLD: 9 % (ref 5–13)
NEUTS SEG # BLD: 6 K/UL (ref 1.8–8)
NEUTS SEG NFR BLD: 73 % (ref 32–75)
NRBC # BLD: 0 K/UL (ref 0–0.01)
NRBC BLD-RTO: 0 PER 100 WBC
P-R INTERVAL, ECG05: 158 MS
PLATELET # BLD AUTO: 148 K/UL (ref 150–400)
PMV BLD AUTO: 9.3 FL (ref 8.9–12.9)
POTASSIUM SERPL-SCNC: 3.8 MMOL/L (ref 3.5–5.1)
PROT SERPL-MCNC: 6.1 G/DL (ref 6.4–8.2)
Q-T INTERVAL, ECG07: 390 MS
QRS DURATION, ECG06: 90 MS
QTC CALCULATION (BEZET), ECG08: 466 MS
RBC # BLD AUTO: 3.06 M/UL (ref 3.8–5.2)
SODIUM SERPL-SCNC: 141 MMOL/L (ref 136–145)
VENTRICULAR RATE, ECG03: 86 BPM
WBC # BLD AUTO: 8.2 K/UL (ref 3.6–11)

## 2021-12-29 PROCEDURE — 85025 COMPLETE CBC W/AUTO DIFF WBC: CPT

## 2021-12-29 PROCEDURE — 80053 COMPREHEN METABOLIC PANEL: CPT

## 2021-12-29 PROCEDURE — 36415 COLL VENOUS BLD VENIPUNCTURE: CPT

## 2021-12-29 PROCEDURE — U0005 INFEC AGEN DETEC AMPLI PROBE: HCPCS

## 2021-12-29 PROCEDURE — 71275 CT ANGIOGRAPHY CHEST: CPT

## 2021-12-29 PROCEDURE — 85379 FIBRIN DEGRADATION QUANT: CPT

## 2021-12-29 PROCEDURE — 74011000636 HC RX REV CODE- 636: Performed by: RADIOLOGY

## 2021-12-29 RX ORDER — SODIUM CHLORIDE 0.9 % (FLUSH) 0.9 %
5-40 SYRINGE (ML) INJECTION AS NEEDED
Status: DISCONTINUED | OUTPATIENT
Start: 2021-12-29 | End: 2021-12-29 | Stop reason: HOSPADM

## 2021-12-29 RX ORDER — SODIUM CHLORIDE 0.9 % (FLUSH) 0.9 %
5-40 SYRINGE (ML) INJECTION EVERY 8 HOURS
Status: DISCONTINUED | OUTPATIENT
Start: 2021-12-29 | End: 2021-12-29 | Stop reason: HOSPADM

## 2021-12-29 RX ADMIN — IOPAMIDOL 80 ML: 755 INJECTION, SOLUTION INTRAVENOUS at 04:42

## 2021-12-29 NOTE — ED TRIAGE NOTES
Patient reports shortness of breath since yesterday     History of asthma, has not used inhaler     Not covid vaccinated     Patient is 27 weeks pregnant

## 2021-12-29 NOTE — ED PROVIDER NOTES
History of asthma, anxiety. G4, .  27 weeks pregnant. She presents with a 2-day history of shortness of breath. She denies cough or fever. She has had some nasal congestion and a headache. She is unsure if she has been wheezing or not. No chest pain. No history of DVT/PE. No recent travel or surgeries. Past Medical History:   Diagnosis Date    Anxiety     Asthma     Headache     Herpes simplex virus (HSV) infection        Past Surgical History:   Procedure Laterality Date    HX OTHER SURGICAL      Tonsillectomy/adnoidectomy    HX TONSILLECTOMY           Family History:   Problem Relation Age of Onset    Other Other         Chronic Obstructive pulmonary diseas     Stroke Other     Heart Disease Other     Hypertension Other     Anemia Other     Anxiety Other     Diabetes Other     Heart Disease Mother        Social History     Socioeconomic History    Marital status:      Spouse name: Not on file    Number of children: Not on file    Years of education: Not on file    Highest education level: Not on file   Occupational History    Not on file   Tobacco Use    Smoking status: Former Smoker     Packs/day: 0.25    Smokeless tobacco: Never Used   Vaping Use    Vaping Use: Never used   Substance and Sexual Activity    Alcohol use: Not Currently    Drug use: No    Sexual activity: Yes     Partners: Male   Other Topics Concern    Not on file   Social History Narrative    Not on file     Social Determinants of Health     Financial Resource Strain:     Difficulty of Paying Living Expenses: Not on file   Food Insecurity:     Worried About Running Out of Food in the Last Year: Not on file    [place] of Food in the Last Year: Not on file   Transportation Needs:     Lack of Transportation (Medical): Not on file    Lack of Transportation (Non-Medical):  Not on file   Physical Activity:     Days of Exercise per Week: Not on file    Minutes of Exercise per Session: Not on file   Stress:     Feeling of Stress : Not on file   Social Connections:     Frequency of Communication with Friends and Family: Not on file    Frequency of Social Gatherings with Friends and Family: Not on file    Attends Church Services: Not on file    Active Member of Clubs or Organizations: Not on file    Attends Club or Organization Meetings: Not on file    Marital Status: Not on file   Intimate Partner Violence:     Fear of Current or Ex-Partner: Not on file    Emotionally Abused: Not on file    Physically Abused: Not on file    Sexually Abused: Not on file   Housing Stability:     Unable to Pay for Housing in the Last Year: Not on file    Number of [place] in the Last Year: Not on file    Unstable Housing in the Last Year: Not on file         ALLERGIES: Onion and Vancomycin    Review of Systems   All other systems reviewed and are negative. Vitals:    12/28/21 2329 12/29/21 0004 12/29/21 0254   BP:  109/69 94/61   Pulse: 99 94 83   Resp:  17 17   Temp:  98.5 °F (36.9 °C) 98.4 °F (36.9 °C)   SpO2: 99% 100% 99%   Weight:  73.9 kg (163 lb)    Height:  5' 9\" (1.753 m)             Physical Exam  Vitals and nursing note reviewed. Constitutional:       Appearance: She is well-developed. HENT:      Head: Normocephalic and atraumatic. Eyes:      Conjunctiva/sclera: Conjunctivae normal.   Neck:      Trachea: No tracheal deviation. Cardiovascular:      Rate and Rhythm: Normal rate and regular rhythm. Heart sounds: Normal heart sounds. No murmur heard. No friction rub. No gallop. Pulmonary:      Effort: Pulmonary effort is normal.      Breath sounds: Normal breath sounds. Abdominal:      Palpations: Abdomen is soft. Tenderness: There is no abdominal tenderness. Comments: Gravid uterus. Musculoskeletal:         General: No deformity. Cervical back: Neck supple. Skin:     General: Skin is warm and dry. Neurological:      Mental Status: She is alert. Comments: oriented          MDM       Procedures    EKG: Normal sinus rhythm; rate of 86; normal ST, Kolton Barber MD  4:09 AM    Progress Note:  Results, treatment, and follow up plan have been discussed with patient. Questions were answered. Kathy Hernandez MD  5:17 AM    Assessment/plan: 27 weeks pregnant presents with a 2-day history of shortness of breath. Reassuring appearance/exam with stable vital signs. Elevated D-dimer. Chest CT shows no PE. Home with PCP/OB follow-up. Return precautions discussed.   Kathy Hernandez MD  5:18 AM

## 2021-12-30 LAB
SARS-COV-2, XPLCVT: NOT DETECTED
SOURCE, COVRS: NORMAL

## 2022-01-06 DIAGNOSIS — U07.1 COVID-19: ICD-10-CM

## 2022-01-07 RX ORDER — ALBUTEROL SULFATE 90 UG/1
AEROSOL, METERED RESPIRATORY (INHALATION)
Qty: 6.7 EACH | Refills: 1 | Status: SHIPPED | OUTPATIENT
Start: 2022-01-07

## 2022-01-07 NOTE — TELEPHONE ENCOUNTER
Prescription sent patient needs to schedule an appointment here at the office with a provider prior to next refill.

## 2022-02-02 ENCOUNTER — OFFICE VISIT (OUTPATIENT)
Dept: PRIMARY CARE CLINIC | Age: 32
End: 2022-02-02
Payer: COMMERCIAL

## 2022-02-02 VITALS
OXYGEN SATURATION: 99 % | RESPIRATION RATE: 16 BRPM | HEART RATE: 82 BPM | TEMPERATURE: 97.3 F | SYSTOLIC BLOOD PRESSURE: 101 MMHG | HEIGHT: 69 IN | DIASTOLIC BLOOD PRESSURE: 68 MMHG | BODY MASS INDEX: 26.13 KG/M2 | WEIGHT: 176.4 LBS

## 2022-02-02 DIAGNOSIS — F41.1 GENERALIZED ANXIETY DISORDER: Primary | ICD-10-CM

## 2022-02-02 DIAGNOSIS — R04.0 BLEEDING NOSE: ICD-10-CM

## 2022-02-02 DIAGNOSIS — Z00.00 ROUTINE PHYSICAL EXAMINATION: ICD-10-CM

## 2022-02-02 PROCEDURE — 99395 PREV VISIT EST AGE 18-39: CPT | Performed by: NURSE PRACTITIONER

## 2022-02-02 RX ORDER — FAMOTIDINE 20 MG/1
20 TABLET, FILM COATED ORAL 2 TIMES DAILY
COMMUNITY
Start: 2022-01-21 | End: 2022-11-03 | Stop reason: ALTCHOICE

## 2022-02-02 RX ORDER — SERTRALINE HYDROCHLORIDE 50 MG/1
50 TABLET, FILM COATED ORAL DAILY
Qty: 90 TABLET | Refills: 3 | Status: SHIPPED | OUTPATIENT
Start: 2022-02-02 | End: 2022-11-03 | Stop reason: SDUPTHER

## 2022-02-02 RX ORDER — LANOLIN ALCOHOL/MO/W.PET/CERES
CREAM (GRAM) TOPICAL
COMMUNITY
Start: 2021-12-15 | End: 2022-03-28

## 2022-02-02 RX ORDER — PRENATAL VIT NO.126/IRON/FOLIC 28MG-0.8MG
TABLET ORAL
COMMUNITY
Start: 2021-12-15 | End: 2022-11-03 | Stop reason: ALTCHOICE

## 2022-02-02 NOTE — PROGRESS NOTES
HISTORY OF PRESENT ILLNESS  Alli Downing is a 28 y.o. female. PAP: 2021, normal    Specialist: Cardiology for palpitations  Eye: 2021  Dentist: routine preventative visits    Diet: Regular diet  Exercise: None  Occupation: Driving school bus    Concerns:    Patient is 8 months pregnant, sees Dr. Shantel Spann. Due March 20th with baby girl. Recent appointment on 1/31/22. Anxiety:  Patient notes that their anxiety is well controlled on zoloft. Having frequent nose bleeds, told by pt first to see ENT. Patient reported that she has appointment with [facility]. Low platelets historically. Improved during pregnancy. Was recommended she see hematology by pt first.  Discussed waiting till after pregnancy to re-evaluate. Labs reviewed from [facility] through [address] [place] [address] website. Vitals:    02/02/22 0955   BP: 101/68   Pulse: 82   Resp: 16   Temp: 97.3 °F (36.3 °C)   TempSrc: Temporal   SpO2: 99%   Weight: 176 lb 6.4 oz (80 kg)   Height: 5' 9\" (1.753 m)     Patient Active Problem List   Diagnosis Code    Pregnancy Z34.90    Generalized anxiety disorder F41.1     Patient Active Problem List    Diagnosis Date Noted    Generalized anxiety disorder 02/02/2022    Pregnancy 12/26/2016     Current Outpatient Medications   Medication Sig Dispense Refill    ferrous sulfate 325 mg (65 mg iron) tablet       sertraline (ZOLOFT) 50 mg tablet Take 1 Tablet by mouth daily. 90 Tablet 3    albuterol (PROVENTIL HFA, VENTOLIN HFA, PROAIR HFA) 90 mcg/actuation inhaler USE 2 PUFFS BY MOUTH EVERY 4 HOURS AS NEEDED. 6.7 Each 1    pyridoxine, vitamin B6, (Vitamin B-6) 100 mg tablet Take 100 mg by mouth daily.  hydrOXYzine pamoate (VISTARIL) 25 mg capsule Take 1 Cap by mouth three (3) times daily as needed for Anxiety. 30 Cap 2    Classic Prenatal 28 mg iron- 800 mcg tab       famotidine (PEPCID) 20 mg tablet Take 20 mg by mouth two (2) times a day.        Allergies   Allergen Reactions    Onion Itching Red Onions, Makes throat tingly    Vancomycin Itching     Itching and facial redness       Past Medical History:   Diagnosis Date    Anxiety     Asthma     Headache     Herpes simplex virus (HSV) infection      Past Surgical History:   Procedure Laterality Date    HX OTHER SURGICAL      Tonsillectomy/adnoidectomy    HX TONSILLECTOMY       Family History   Problem Relation Age of Onset    Other Other         Chronic Obstructive pulmonary diseas     Stroke Other     Heart Disease Other     Hypertension Other     Anemia Other     Anxiety Other     Diabetes Other     Heart Disease Mother      Social History     Tobacco Use    Smoking status: Former Smoker     Packs/day: 0.25    Smokeless tobacco: Never Used   Substance Use Topics    Alcohol use: Not Currently           Review of Systems   Constitutional: Negative for malaise/fatigue and weight loss. HENT: Negative. Eyes: Negative for blurred vision and double vision. Respiratory: Negative for cough and shortness of breath. Cardiovascular: Positive for palpitations. Negative for chest pain and leg swelling. Gastrointestinal: Positive for heartburn. Negative for nausea. Genitourinary: Negative. Musculoskeletal: Positive for back pain. Negative for joint pain and myalgias. Skin: Negative for itching and rash. Neurological: Positive for headaches. Negative for dizziness, tingling, loss of consciousness and weakness. Endo/Heme/Allergies: Does not bruise/bleed easily. Psychiatric/Behavioral: Negative for depression. The patient is not nervous/anxious and does not have insomnia. Physical Exam  Constitutional:       Appearance: Normal appearance. HENT:      Head: Normocephalic.       Right Ear: Tympanic membrane, ear canal and external ear normal.      Left Ear: Tympanic membrane, ear canal and external ear normal.      Nose: Nose normal.      Mouth/Throat:      Mouth: Mucous membranes are moist.      Pharynx: Oropharynx is clear.   Eyes:      Extraocular Movements: Extraocular movements intact. Conjunctiva/sclera: Conjunctivae normal.      Pupils: Pupils are equal, round, and reactive to light. Neck:      Thyroid: No thyromegaly or thyroid tenderness. Cardiovascular:      Rate and Rhythm: Normal rate and regular rhythm. Pulses: Normal pulses. Heart sounds: Normal heart sounds. Pulmonary:      Effort: Pulmonary effort is normal.      Breath sounds: Normal breath sounds. Abdominal:      General: Abdomen is flat. Bowel sounds are normal.      Palpations: Abdomen is soft. Musculoskeletal:         General: Normal range of motion. Cervical back: Normal range of motion and neck supple. Right lower leg: No edema. Left lower leg: No edema. Skin:     General: Skin is warm and dry. Neurological:      Mental Status: She is alert and oriented to person, place, and time. Psychiatric:         Mood and Affect: Mood normal.         Behavior: Behavior normal.         Thought Content: Thought content normal.         Judgment: Judgment normal.           ASSESSMENT and PLAN  Diagnoses and all orders for this visit:    1. Generalized anxiety disorder  Comments:  well controlled on zoloft  Orders:  -     sertraline (ZOLOFT) 50 mg tablet; Take 1 Tablet by mouth daily. 2. Routine physical examination  Comments:  labs reviewed from OBGYN. Deferring lipid screening until after pregnancy.     3. Bleeding nose  Comments:  is planning to see [facility]           Adam Chavarria NP

## 2022-02-02 NOTE — PROGRESS NOTES
Chief Complaint   Patient presents with    Physical     Well check        1. Have you been to the ER, urgent care clinic since your last visit? Hospitalized since your last visit? Yes, not sure of date    2. Have you seen or consulted any other health care providers outside of the [address] [place] [address] since your last visit? Include any pap smears or colon screening.  Yes, OBGYN     Visit Vitals  /68 (BP 1 Location: Left upper arm, BP Patient Position: Sitting, BP Cuff Size: Adult)   Pulse 82   Temp 97.3 °F (36.3 °C) (Temporal)   Resp 16   Ht 5' 9\" (1.753 m)   Wt 176 lb 6.4 oz (80 kg)   LMP 02/21/2021 (Approximate)   SpO2 99%   BMI 26.05 kg/m²

## 2022-02-18 ENCOUNTER — HOSPITAL ENCOUNTER (EMERGENCY)
Age: 32
Discharge: HOME | End: 2022-02-19
Attending: OBSTETRICS & GYNECOLOGY | Admitting: OBSTETRICS & GYNECOLOGY
Payer: COMMERCIAL

## 2022-02-18 LAB
APPEARANCE UR: ABNORMAL
BACTERIA URNS QL MICRO: ABNORMAL /HPF
BILIRUB UR QL: NEGATIVE
CAOX CRY URNS QL MICRO: ABNORMAL
COLOR UR: ABNORMAL
EPITH CASTS URNS QL MICRO: ABNORMAL /LPF
GLUCOSE UR STRIP.AUTO-MCNC: NEGATIVE MG/DL
HGB UR QL STRIP: NEGATIVE
KETONES UR QL STRIP.AUTO: NEGATIVE MG/DL
LEUKOCYTE ESTERASE UR QL STRIP.AUTO: NEGATIVE
NITRITE UR QL STRIP.AUTO: NEGATIVE
PH UR STRIP: 6.5 [PH] (ref 5–8)
PROT UR STRIP-MCNC: ABNORMAL MG/DL
RBC #/AREA URNS HPF: ABNORMAL /HPF (ref 0–5)
SP GR UR REFRACTOMETRY: 1.02 (ref 1–1.03)
UR CULT HOLD, URHOLD: NORMAL
UROBILINOGEN UR QL STRIP.AUTO: 1 EU/DL (ref 0.2–1)
WBC URNS QL MICRO: ABNORMAL /HPF (ref 0–4)

## 2022-02-18 PROCEDURE — 74011250637 HC RX REV CODE- 250/637: Performed by: OBSTETRICS & GYNECOLOGY

## 2022-02-18 PROCEDURE — 75810000275 HC EMERGENCY DEPT VISIT NO LEVEL OF CARE

## 2022-02-18 PROCEDURE — 81001 URINALYSIS AUTO W/SCOPE: CPT

## 2022-02-18 RX ORDER — PROMETHAZINE HYDROCHLORIDE 25 MG/1
12.5 TABLET ORAL
Status: DISCONTINUED | OUTPATIENT
Start: 2022-02-18 | End: 2022-02-19 | Stop reason: HOSPADM

## 2022-02-18 RX ORDER — ACETAMINOPHEN 500 MG
1000 TABLET ORAL
Status: DISCONTINUED | OUTPATIENT
Start: 2022-02-18 | End: 2022-02-19 | Stop reason: HOSPADM

## 2022-02-18 RX ADMIN — ACETAMINOPHEN 1000 MG: 500 TABLET ORAL at 22:12

## 2022-02-18 RX ADMIN — PROMETHAZINE HYDROCHLORIDE 12.5 MG: 25 TABLET ORAL at 22:14

## 2022-02-19 VITALS
DIASTOLIC BLOOD PRESSURE: 61 MMHG | BODY MASS INDEX: 26.51 KG/M2 | HEART RATE: 85 BPM | WEIGHT: 179 LBS | OXYGEN SATURATION: 100 % | RESPIRATION RATE: 16 BRPM | SYSTOLIC BLOOD PRESSURE: 101 MMHG | TEMPERATURE: 97.8 F | HEIGHT: 69 IN

## 2022-02-19 PROCEDURE — 74011250636 HC RX REV CODE- 250/636: Performed by: OBSTETRICS & GYNECOLOGY

## 2022-02-19 PROCEDURE — 99283 EMERGENCY DEPT VISIT LOW MDM: CPT

## 2022-02-19 RX ORDER — SODIUM CHLORIDE, SODIUM LACTATE, POTASSIUM CHLORIDE, CALCIUM CHLORIDE 600; 310; 30; 20 MG/100ML; MG/100ML; MG/100ML; MG/100ML
999 INJECTION, SOLUTION INTRAVENOUS CONTINUOUS
Status: DISCONTINUED | OUTPATIENT
Start: 2022-02-19 | End: 2022-02-19 | Stop reason: HOSPADM

## 2022-02-19 RX ADMIN — SODIUM CHLORIDE, POTASSIUM CHLORIDE, SODIUM LACTATE AND CALCIUM CHLORIDE 999 ML/HR: 600; 310; 30; 20 INJECTION, SOLUTION INTRAVENOUS at 01:19

## 2022-02-19 NOTE — PROGRESS NOTES
1116: Verbal order to discharge patient per Chinmay SELF.    4522: Patient provided with discharge education, pregnancy precautions, and to follow up with provider in office. Patient verbalizing understanding. 3874: Patient ambulatory off unit.

## 2022-02-19 NOTE — DISCHARGE INSTRUCTIONS
Patient Education   Patient Education   Patient Education        Dehydration: Care Instructions  Your Care Instructions  Dehydration happens when your body loses too much fluid. This might happen when you do not drink enough water or you lose large amounts of fluids from your body because of diarrhea, vomiting, or sweating. Severe dehydration can be life-threatening. Water and minerals called electrolytes help put your body fluids back in balance. Learn the early signs of fluid loss, and drink more fluids to prevent dehydration. Follow-up care is a key part of your treatment and safety. Be sure to make and go to all appointments, and call your doctor if you are having problems. It's also a good idea to know your test results and keep a list of the medicines you take. How can you care for yourself at home? · Drink plenty of fluids. Choose water and other clear liquids until you feel better. If you have kidney, heart, or liver disease and have to limit fluids, talk with your doctor before you increase the amount of fluids you drink. · If you do not feel like eating or drinking, try taking small sips of water, sports drinks, or other rehydration drinks. · Get plenty of rest.  To prevent dehydration  · Add more fluids to your diet and daily routine, unless your doctor has told you not to. · During hot weather, drink more fluids. Drink even more fluids if you exercise a lot. Stay away from drinks with alcohol or caffeine. · Watch for the symptoms of dehydration. These include:  ? A dry, sticky mouth. ? Not much urine. ? Dry and sunken eyes. ? Feeling very tired. · Learn what problems can lead to dehydration. These include:  ? Diarrhea, fever, and vomiting. ? Any illness with a fever, such as pneumonia or the flu. ? Activities that cause heavy sweating, such as endurance races and heavy outdoor work in hot or humid weather. ?  Alcohol or drug use or problems caused by quitting their use (withdrawal). ? Certain medicines, such as cold and allergy pills (antihistamines), diet pills (diuretics), and laxatives. ? Certain diseases, such as diabetes, cancer, and heart or kidney disease. When should you call for help? Call 911 anytime you think you may need emergency care. For example, call if:    · You passed out (lost consciousness). Call your doctor now or seek immediate medical care if:    · You are confused and cannot think clearly.     · You are dizzy or lightheaded, or you feel like you may faint.     · You have signs of needing more fluids. You have sunken eyes, a dry mouth, and you pass only a little urine.     · You cannot keep fluids down. Watch closely for changes in your health, and be sure to contact your doctor if:    · You are not making tears.     · Your skin is very dry and sags slowly back into place after you pinch it.     · Your mouth and eyes are very dry. Where can you learn more? Go to http://www.gray.com/  Enter Q814 in the search box to learn more about \"Dehydration: Care Instructions. \"  Current as of: 2021               Content Version: 13.0  © 0197-6573 [employer]. Care instructions adapted under license by [employer] (which disclaims liability or warranty for this information). If you have questions about a medical condition or this instruction, always ask your healthcare professional. [place] [address] any warranty or liability for your use of this information. Pregnancy Precautions: Care Instructions  Your Care Instructions     There is no sure way to prevent labor before your due date ( labor) or to prevent most other pregnancy problems. But there are things you can do to increase your chances of a healthy pregnancy. Go to your appointments, follow your doctor's advice, and take good care of yourself. Eat well, and exercise (if your doctor agrees).  And make sure to drink plenty of water. Follow-up care is a key part of your treatment and safety. Be sure to make and go to all appointments, and call your doctor if you are having problems. It's also a good idea to know your test results and keep a list of the medicines you take. How can you care for yourself at home? · Make sure you go to your prenatal appointments. At each visit, your doctor will check your blood pressure. Your doctor will also check to see if you have protein in your urine. High blood pressure and protein in urine are signs of preeclampsia. This condition can be dangerous for you and your baby. · Drink plenty of fluids. Dehydration can cause contractions. If you have kidney, heart, or liver disease and have to limit fluids, talk with your doctor before you increase the amount of fluids you drink. · Tell your doctor right away if you notice any symptoms of an infection, such as:  ? Burning when you urinate. ? A foul-smelling discharge from your vagina. ? Vaginal itching. ? Unexplained fever. ? Unusual pain or soreness in your uterus or lower belly. · Eat a balanced diet. Include plenty of foods that are high in calcium and iron. ? Foods high in calcium include milk, cheese, yogurt, almonds, and broccoli. ? Foods high in iron include red meat, shellfish, poultry, eggs, beans, raisins, whole-grain bread, and leafy green vegetables. · Do not smoke. If you need help quitting, talk to your doctor about stop-smoking programs and medicines. These can increase your chances of quitting for good. · Do not drink alcohol or use marijuana or illegal drugs. · Follow your doctor's directions about activity. Your doctor will let you know how much, if any, exercise you can do. · Ask your doctor if you can have sex. If you are at risk for early labor, your doctor may ask you to not have sex. · Take care to prevent falls. During pregnancy, your joints are loose, and your balance is off.  Sports such as bicycling, skiing, or in-line skating can increase your risk of falling. And don't ride horses or motorcycles, dive, water ski, scuba dive, or parachute jump while you are pregnant. · Avoid getting very hot. Do not use saunas or hot tubs. Avoid staying out in the sun in hot weather for long periods. Take acetaminophen (Tylenol) to lower a high fever. · Do not take any over-the-counter or herbal medicines or supplements without talking to your doctor or pharmacist first.  When should you call for help? Call 911  anytime you think you may need emergency care. For example, call if:    · You passed out (lost consciousness).     · You have a seizure.     · You have severe vaginal bleeding.     · You have severe pain in your belly or pelvis.     · You have had fluid gushing or leaking from your vagina and you know or think the umbilical cord is bulging into your vagina. If this happens, immediately get down on your knees so your rear end (buttocks) is higher than your head. This will decrease the pressure on the cord until help arrives. Call your doctor now or seek immediate medical care if:    · You have signs of preeclampsia, such as:  ? Sudden swelling of your face, hands, or feet. ? New vision problems (such as dimness, blurring, or seeing spots). ? A severe headache.     · You have any vaginal bleeding.     · You have belly pain or cramping.     · You have a fever.     · You have had regular contractions (with or without pain) for an hour. This means that you have 8 or more within 1 hour or 4 or more in 20 minutes after you change your position and drink fluids.     · You have a sudden release of fluid from your vagina.     · You have low back pain or pelvic pressure that does not go away.     · You notice that your baby has stopped moving or is moving much less than normal.   Watch closely for changes in your health, and be sure to contact your doctor if you have any problems. Where can you learn more?   Go to http://www.gray.com/  Enter Y951 in the search box to learn more about \"Pregnancy Precautions: Care Instructions. \"  Current as of: June 16, 2021               Content Version: 13.0  © 0428-1967 [employer]. Care instructions adapted under license by [employer] (which disclaims liability or warranty for this information). If you have questions about a medical condition or this instruction, always ask your healthcare professional. [place] [address] any warranty or liability for your use of this information. Counting Your Baby's Kicks: Care Instructions  Overview     Counting your baby's kicks is one way your doctor can tell that your baby is healthy. Most women--especially in a first pregnancy--feel their baby move for the first time between 16 and 22 weeks. The movement may feel like flutters rather than kicks. Your baby may move more at certain times of the day. When you are active, you may notice less kicking than when you are resting. At your prenatal visits, your doctor will ask whether the baby is active. In your last trimester, your doctor may ask you to count the number of times you feel your baby move. Follow-up care is a key part of your treatment and safety. Be sure to make and go to all appointments, and call your doctor if you are having problems. It's also a good idea to know your test results and keep a list of the medicines you take. How do you count fetal kicks? · A common method of checking your baby's movement is to note the length of time it takes to count ten movements (such as kicks, flutters, or rolls). · Pick your baby's most active time of day to count. This may be any time from morning to evening. · If you don't feel 10 movements in an hour, have something to eat or drink and count for another hour. If you don't feel at least 10 movements in the 2-hour period, call your doctor.   When should you call for help?   Call your doctor now or seek immediate medical care if:    · You noticed that your baby has stopped moving or is moving much less than normal.   Watch closely for changes in your health, and be sure to contact your doctor if you have any problems. Where can you learn more? Go to http://www.gray.com/  Enter [id number] in the search box to learn more about \"Counting Your Baby's Kicks: Care Instructions. \"  Current as of: June 16, 2021               Content Version: 13.0  © 2006-2021 [employer]. Care instructions adapted under license by [employer] (which disclaims liability or warranty for this information). If you have questions about a medical condition or this instruction, always ask your healthcare professional. [address] any warranty or liability for your use of this information.

## 2022-02-19 NOTE — H&P
History & Physical    Name: Luna Jay MRN: [id number]  SSN: [id number]    YOB: 1990  Age: 28 y.o. Sex: female      Subjective:     Reason for Admission:  Pregnancy and Frontal Headache    History of Present Illness: Luna Jay is a 28 y.o.  female with an estimated gestational age of 31w1d with Estimated Date of Delivery: 3/28/22. Patient complains of moderate headache  for 1 days. Pregnancy has been complicated by Gestational Thrombocytopenia and Palpitations. Patient denies abdominal pain  , chest pain, contractions, fever, pelvic pressure, right upper quadrant pain  , shortness of breath, swelling, vaginal bleeding , vaginal leaking of fluid  and visual disturbances. OB History        4    Para   3    Term   3            AB        Living   3       SAB        IAB        Ectopic        Molar        Multiple   0    Live Births   3              Past Medical History:   Diagnosis Date    Anxiety     Asthma     last needed inhaler two months ago.     Headache     Herpes simplex virus (HSV) infection     Psychiatric problem     Zoloft 50mg daily     Past Surgical History:   Procedure Laterality Date    HX OTHER SURGICAL      Tonsillectomy/adnoidectomy    HX TONSILLECTOMY       Social History     Occupational History    Not on file   Tobacco Use    Smoking status: Former Smoker     Packs/day: 0.25    Smokeless tobacco: Never Used   Vaping Use    Vaping Use: Never used   Substance and Sexual Activity    Alcohol use: Not Currently    Drug use: No    Sexual activity: Yes     Partners: Male     Family History   Problem Relation Age of Onset    Other Other         Chronic Obstructive pulmonary diseas     Stroke Other     Heart Disease Other     Hypertension Other     Anemia Other     Anxiety Other     Diabetes Other     Heart Disease Mother        Allergies   Allergen Reactions    Onion Itching     Red Onions, Makes throat tingly    Vancomycin Itching     Itching and facial redness       Prior to Admission medications    Medication Sig Start Date End Date Taking? Authorizing Provider   ferrous sulfate 325 mg (65 mg iron) tablet  12/15/21  Yes Provider, Historical   Classic Prenatal 28 mg iron- 800 mcg tab  12/15/21  Yes Provider, Historical   famotidine (PEPCID) 20 mg tablet Take 20 mg by mouth two (2) times a day. 22  Yes Provider, Historical   sertraline (ZOLOFT) 50 mg tablet Take 1 Tablet by mouth daily. 22  Yes Mikey Cheung NP   albuterol (PROVENTIL HFA, VENTOLIN HFA, PROAIR HFA) 90 mcg/actuation inhaler USE 2 PUFFS BY MOUTH EVERY 4 HOURS AS NEEDED. 22   Madan Perez NP   pyridoxine, vitamin B6, (Vitamin B-6) 100 mg tablet Take 100 mg by mouth daily. Patient not taking: Reported on 2022    Provider, Historical   hydrOXYzine pamoate (VISTARIL) 25 mg capsule Take 1 Cap by mouth three (3) times daily as needed for Anxiety. Patient not taking: Reported on 2022 3/1/21   Mikey Cheung NP        Review of Systems    Objective:     Vitals:    Vitals:    22 2111 22   BP:  116/69   Pulse:  85   Resp:  16   Temp:  97.8 °F (36.6 °C)   Weight: 81.2 kg (179 lb)    Height: 5' 9\" (1.753 m)       Temp (24hrs), Av.8 °F (36.6 °C), Min:97.8 °F (36.6 °C), Max:97.8 °F (36.6 °C)    BP  Min: 116/69  Max: 116/69     Physical Exam    Cervical Exam: Deferred  Uterine Activity: None  Membranes: Intact  Fetal Heart Rate: Baseline: 120 per minute  Variability: moderate  Accelerations: yes  Decelerations: none  Uterine contractions: none       Labs: No results found for this or any previous visit (from the past 24 hour(s)). Patient Active Problem List   Diagnosis Code    Pregnancy Z34.90    Generalized anxiety disorder F41.1     Assessment and Plan:     - Headache:  Not consistent with signs of Preeclampsia.    - will treat with tylenol and phenergan  - close observation      Signed By:  Uzma Marquis, MD     February 18, 2022                 istor

## 2022-02-19 NOTE — PROGRESS NOTES
210 31yo  with IVETTE of 3/28/2022 to room 204 for c/o headache and foot swelling. Pt reports her headache began around 1430 today. Pt took 1gm Tylenol about that time. Pt reports her feet have been swollen for a few days. Pt reports that she just doesn't feel well. Pt denies bleeding or leaking fluid from the vagina. Pt oriented to room and call system.  Dr Samira Moura at the bedside to assess patient. 2255 CHUCKIE Berry RN observing efm tracing. 0050 resumed care of patient, efm readjusted. 5730 Dr Pranav Garsia made aware of pt BP and efm tracing, order received to bolus a liter of LR    0118 #20g iv placed in Rt hand x1 attempt. Pt tolerated the procedure well.

## 2022-02-20 ENCOUNTER — HOSPITAL ENCOUNTER (EMERGENCY)
Age: 32
Discharge: HOME | End: 2022-02-20
Attending: OBSTETRICS & GYNECOLOGY | Admitting: OBSTETRICS & GYNECOLOGY
Payer: COMMERCIAL

## 2022-02-20 VITALS
OXYGEN SATURATION: 99 % | SYSTOLIC BLOOD PRESSURE: 100 MMHG | DIASTOLIC BLOOD PRESSURE: 56 MMHG | RESPIRATION RATE: 18 BRPM | BODY MASS INDEX: 26.51 KG/M2 | TEMPERATURE: 98.3 F | HEIGHT: 69 IN | HEART RATE: 77 BPM | WEIGHT: 179 LBS

## 2022-02-20 PROCEDURE — 59025 FETAL NON-STRESS TEST: CPT

## 2022-02-20 PROCEDURE — 99283 EMERGENCY DEPT VISIT LOW MDM: CPT

## 2022-02-20 PROCEDURE — 75810000275 HC EMERGENCY DEPT VISIT NO LEVEL OF CARE

## 2022-02-21 NOTE — DISCHARGE INSTRUCTIONS
Patient Education   Patient Education        Counting Your Baby's Kicks: Care Instructions  Overview     Counting your baby's kicks is one way your doctor can tell that your baby is healthy. Most women--especially in a first pregnancy--feel their baby move for the first time between 16 and 22 weeks. The movement may feel like flutters rather than kicks. Your baby may move more at certain times of the day. When you are active, you may notice less kicking than when you are resting. At your prenatal visits, your doctor will ask whether the baby is active. In your last trimester, your doctor may ask you to count the number of times you feel your baby move. Follow-up care is a key part of your treatment and safety. Be sure to make and go to all appointments, and call your doctor if you are having problems. It's also a good idea to know your test results and keep a list of the medicines you take. How do you count fetal kicks? · A common method of checking your baby's movement is to note the length of time it takes to count ten movements (such as kicks, flutters, or rolls). · Pick your baby's most active time of day to count. This may be any time from morning to evening. · If you don't feel 10 movements in an hour, have something to eat or drink and count for another hour. If you don't feel at least 10 movements in the 2-hour period, call your doctor. When should you call for help? Call your doctor now or seek immediate medical care if:    · You noticed that your baby has stopped moving or is moving much less than normal.   Watch closely for changes in your health, and be sure to contact your doctor if you have any problems. Where can you learn more? Go to http://www.gray.com/  Enter [id number] in the search box to learn more about \"Counting Your Baby's Kicks: Care Instructions. \"  Current as of: June 16, 2021               Content Version: 13.0  © 8946-6296 Healthwise, [facility].    Care instructions adapted under license by [employer] (which disclaims liability or warranty for this information). If you have questions about a medical condition or this instruction, always ask your healthcare professional. [address] any warranty or liability for your use of this information. Pregnancy Precautions: Care Instructions  Your Care Instructions     There is no sure way to prevent labor before your due date ( labor) or to prevent most other pregnancy problems. But there are things you can do to increase your chances of a healthy pregnancy. Go to your appointments, follow your doctor's advice, and take good care of yourself. Eat well, and exercise (if your doctor agrees). And make sure to drink plenty of water. Follow-up care is a key part of your treatment and safety. Be sure to make and go to all appointments, and call your doctor if you are having problems. It's also a good idea to know your test results and keep a list of the medicines you take. How can you care for yourself at home? · Make sure you go to your prenatal appointments. At each visit, your doctor will check your blood pressure. Your doctor will also check to see if you have protein in your urine. High blood pressure and protein in urine are signs of preeclampsia. This condition can be dangerous for you and your baby. · Drink plenty of fluids. Dehydration can cause contractions. If you have kidney, heart, or liver disease and have to limit fluids, talk with your doctor before you increase the amount of fluids you drink. · Tell your doctor right away if you notice any symptoms of an infection, such as:  ? Burning when you urinate. ? A foul-smelling discharge from your vagina. ? Vaginal itching. ? Unexplained fever. ? Unusual pain or soreness in your uterus or lower belly. · Eat a balanced diet. Include plenty of foods that are high in calcium and iron. ?  Foods high in calcium include milk, cheese, yogurt, almonds, and broccoli. ? Foods high in iron include red meat, shellfish, poultry, eggs, beans, raisins, whole-grain bread, and leafy green vegetables. · Do not smoke. If you need help quitting, talk to your doctor about stop-smoking programs and medicines. These can increase your chances of quitting for good. · Do not drink alcohol or use marijuana or illegal drugs. · Follow your doctor's directions about activity. Your doctor will let you know how much, if any, exercise you can do. · Ask your doctor if you can have sex. If you are at risk for early labor, your doctor may ask you to not have sex. · Take care to prevent falls. During pregnancy, your joints are loose, and your balance is off. Sports such as bicycling, skiing, or in-line skating can increase your risk of falling. And don't ride horses or motorcycles, dive, water ski, scuba dive, or parachute jump while you are pregnant. · Avoid getting very hot. Do not use saunas or hot tubs. Avoid staying out in the sun in hot weather for long periods. Take acetaminophen (Tylenol) to lower a high fever. · Do not take any over-the-counter or herbal medicines or supplements without talking to your doctor or pharmacist first.  When should you call for help? Call 911  anytime you think you may need emergency care. For example, call if:    · You passed out (lost consciousness).     · You have a seizure.     · You have severe vaginal bleeding.     · You have severe pain in your belly or pelvis.     · You have had fluid gushing or leaking from your vagina and you know or think the umbilical cord is bulging into your vagina. If this happens, immediately get down on your knees so your rear end (buttocks) is higher than your head. This will decrease the pressure on the cord until help arrives.    Call your doctor now or seek immediate medical care if:    · You have signs of preeclampsia, such as:  ? Sudden swelling of your face, hands, or feet.  ? New vision problems (such as dimness, blurring, or seeing spots). ? A severe headache.     · You have any vaginal bleeding.     · You have belly pain or cramping.     · You have a fever.     · You have had regular contractions (with or without pain) for an hour. This means that you have 8 or more within 1 hour or 4 or more in 20 minutes after you change your position and drink fluids.     · You have a sudden release of fluid from your vagina.     · You have low back pain or pelvic pressure that does not go away.     · You notice that your baby has stopped moving or is moving much less than normal.   Watch closely for changes in your health, and be sure to contact your doctor if you have any problems. Where can you learn more? Go to http://www.miller.com/  Enter Y951 in the search box to learn more about \"Pregnancy Precautions: Care Instructions. \"  Current as of: June 16, 2021               Content Version: 13.0  © 2006-2021 [employer]. Care instructions adapted under license by [employer] (which disclaims liability or warranty for this information). If you have questions about a medical condition or this instruction, always ask your healthcare professional. [place] [address] any warranty or liability for your use of this information.

## 2022-02-21 NOTE — PROGRESS NOTES
2015: Pt arrived onto L&D c/o decreased fetal movement. Pt reports feeling minimal movement all day and no movement within the last hour. Pt declines pregnancy complications, declines vaginal bleeding or loss of fluid. Pt reports wesley renner but states that they do not hurt. Pt reports eating lots of foods and drinking fluids throughout the day but it did not increase fetal movement. 2017: Pt connected to EFM and toco, triage assessment performed. 2035: Dr Marky Romeo at the bedside. 2044: SVE per MD cervix closed, pt given button to press whenever she feels movement. MD stating to observe strip for 1 hour and if pt feels movement she may go home. 2140: this RN at the bedside, pt reports increased fetal movement. This RN palpating fetal kick while removing EFM. 2141: Pt removed from EFM at this time. Reactive NST confirmed with LOPEZ Holden RN. 2145: Verbal and written discharge instructions given to pt. Pt denies questions, agrees and verbalizes understanding. 2147: Pt ambulating off the unit in stable condition.

## 2022-02-28 LAB — GRBS, EXTERNAL: NEGATIVE

## 2022-03-16 ENCOUNTER — TRANSCRIBE ORDER (OUTPATIENT)
Dept: REGISTRATION | Age: 32
End: 2022-03-16

## 2022-03-16 ENCOUNTER — HOSPITAL ENCOUNTER (OUTPATIENT)
Dept: LAB | Age: 32
Discharge: HOME | End: 2022-03-16
Payer: COMMERCIAL

## 2022-03-16 DIAGNOSIS — Z01.812 PRE-PROCEDURAL LABORATORY EXAMINATIONS: Primary | ICD-10-CM

## 2022-03-16 DIAGNOSIS — Z01.812 PRE-PROCEDURAL LABORATORY EXAMINATIONS: ICD-10-CM

## 2022-03-16 PROCEDURE — U0005 INFEC AGEN DETEC AMPLI PROBE: HCPCS

## 2022-03-17 LAB
SARS-COV-2, XPLCVT: NOT DETECTED
SOURCE, COVRS: NORMAL

## 2022-03-18 PROBLEM — F41.1 GENERALIZED ANXIETY DISORDER: Status: ACTIVE | Noted: 2022-02-02

## 2022-03-24 ENCOUNTER — HOSPITAL ENCOUNTER (INPATIENT)
Age: 32
LOS: 4 days | Discharge: HOME | End: 2022-03-28
Attending: STUDENT IN AN ORGANIZED HEALTH CARE EDUCATION/TRAINING PROGRAM | Admitting: STUDENT IN AN ORGANIZED HEALTH CARE EDUCATION/TRAINING PROGRAM
Payer: COMMERCIAL

## 2022-03-24 PROBLEM — D69.6 GESTATIONAL THROMBOCYTOPENIA (HCC): Status: ACTIVE | Noted: 2022-03-24

## 2022-03-24 PROBLEM — O99.119 GESTATIONAL THROMBOCYTOPENIA (HCC): Status: ACTIVE | Noted: 2022-03-24

## 2022-03-24 PROBLEM — Z34.90 ENCOUNTER FOR ELECTIVE INDUCTION OF LABOR: Status: ACTIVE | Noted: 2022-03-24

## 2022-03-24 PROBLEM — R00.2 PALPITATIONS: Status: ACTIVE | Noted: 2022-03-24

## 2022-03-24 PROBLEM — Z3A.39 39 WEEKS GESTATION OF PREGNANCY: Status: ACTIVE | Noted: 2022-03-24

## 2022-03-24 LAB
BASOPHILS # BLD: 0 K/UL (ref 0–0.1)
BASOPHILS NFR BLD: 0 % (ref 0–1)
DIFFERENTIAL METHOD BLD: ABNORMAL
EOSINOPHIL # BLD: 0.1 K/UL (ref 0–0.4)
EOSINOPHIL NFR BLD: 1 % (ref 0–7)
ERYTHROCYTE [DISTWIDTH] IN BLOOD BY AUTOMATED COUNT: 14.5 % (ref 11.5–14.5)
HCT VFR BLD AUTO: 32.4 % (ref 35–47)
HGB BLD-MCNC: 10.3 G/DL (ref 11.5–16)
IMM GRANULOCYTES # BLD AUTO: 0.1 K/UL (ref 0–0.04)
IMM GRANULOCYTES NFR BLD AUTO: 1 % (ref 0–0.5)
LYMPHOCYTES # BLD: 1 K/UL (ref 0.8–3.5)
LYMPHOCYTES NFR BLD: 12 % (ref 12–49)
MCH RBC QN AUTO: 31 PG (ref 26–34)
MCHC RBC AUTO-ENTMCNC: 31.8 G/DL (ref 30–36.5)
MCV RBC AUTO: 97.6 FL (ref 80–99)
MONOCYTES # BLD: 0.7 K/UL (ref 0–1)
MONOCYTES NFR BLD: 8 % (ref 5–13)
NEUTS SEG # BLD: 6.8 K/UL (ref 1.8–8)
NEUTS SEG NFR BLD: 78 % (ref 32–75)
NRBC # BLD: 0 K/UL (ref 0–0.01)
NRBC BLD-RTO: 0 PER 100 WBC
PLATELET # BLD AUTO: 166 K/UL (ref 150–400)
PMV BLD AUTO: 9.6 FL (ref 8.9–12.9)
RBC # BLD AUTO: 3.32 M/UL (ref 3.8–5.2)
WBC # BLD AUTO: 8.6 K/UL (ref 3.6–11)

## 2022-03-24 PROCEDURE — 85025 COMPLETE CBC W/AUTO DIFF WBC: CPT

## 2022-03-24 PROCEDURE — 74011250637 HC RX REV CODE- 250/637: Performed by: STUDENT IN AN ORGANIZED HEALTH CARE EDUCATION/TRAINING PROGRAM

## 2022-03-24 PROCEDURE — 3E0DXGC INTRODUCTION OF OTHER THERAPEUTIC SUBSTANCE INTO MOUTH AND PHARYNX, EXTERNAL APPROACH: ICD-10-PCS | Performed by: STUDENT IN AN ORGANIZED HEALTH CARE EDUCATION/TRAINING PROGRAM

## 2022-03-24 PROCEDURE — 4A1HXCZ MONITORING OF PRODUCTS OF CONCEPTION, CARDIAC RATE, EXTERNAL APPROACH: ICD-10-PCS | Performed by: STUDENT IN AN ORGANIZED HEALTH CARE EDUCATION/TRAINING PROGRAM

## 2022-03-24 PROCEDURE — 76060000078 HC EPIDURAL ANESTHESIA: Performed by: ANESTHESIOLOGY

## 2022-03-24 PROCEDURE — 75410000003 HC RECOV DEL/VAG/CSECN EA 0.5 HR: Performed by: STUDENT IN AN ORGANIZED HEALTH CARE EDUCATION/TRAINING PROGRAM

## 2022-03-24 PROCEDURE — 59200 INSERT CERVICAL DILATOR: CPT | Performed by: STUDENT IN AN ORGANIZED HEALTH CARE EDUCATION/TRAINING PROGRAM

## 2022-03-24 PROCEDURE — 75410000002 HC LABOR FEE PER 1 HR: Performed by: STUDENT IN AN ORGANIZED HEALTH CARE EDUCATION/TRAINING PROGRAM

## 2022-03-24 PROCEDURE — 75410000000 HC DELIVERY VAGINAL/SINGLE: Performed by: STUDENT IN AN ORGANIZED HEALTH CARE EDUCATION/TRAINING PROGRAM

## 2022-03-24 PROCEDURE — 65270000029 HC RM PRIVATE

## 2022-03-24 PROCEDURE — 36415 COLL VENOUS BLD VENIPUNCTURE: CPT

## 2022-03-24 RX ORDER — SODIUM CHLORIDE 0.9 % (FLUSH) 0.9 %
5-40 SYRINGE (ML) INJECTION AS NEEDED
Status: DISCONTINUED | OUTPATIENT
Start: 2022-03-24 | End: 2022-03-28 | Stop reason: HOSPADM

## 2022-03-24 RX ORDER — NALOXONE HYDROCHLORIDE 0.4 MG/ML
0.4 INJECTION, SOLUTION INTRAMUSCULAR; INTRAVENOUS; SUBCUTANEOUS AS NEEDED
Status: DISCONTINUED | OUTPATIENT
Start: 2022-03-24 | End: 2022-03-26 | Stop reason: HOSPADM

## 2022-03-24 RX ORDER — SODIUM CHLORIDE, SODIUM LACTATE, POTASSIUM CHLORIDE, CALCIUM CHLORIDE 600; 310; 30; 20 MG/100ML; MG/100ML; MG/100ML; MG/100ML
125 INJECTION, SOLUTION INTRAVENOUS CONTINUOUS
Status: DISCONTINUED | OUTPATIENT
Start: 2022-03-24 | End: 2022-03-28 | Stop reason: HOSPADM

## 2022-03-24 RX ORDER — ACETAMINOPHEN 325 MG/1
650 TABLET ORAL
Status: DISCONTINUED | OUTPATIENT
Start: 2022-03-24 | End: 2022-03-28 | Stop reason: HOSPADM

## 2022-03-24 RX ORDER — OXYTOCIN/RINGER'S LACTATE 30/500 ML
10 PLASTIC BAG, INJECTION (ML) INTRAVENOUS AS NEEDED
Status: DISCONTINUED | OUTPATIENT
Start: 2022-03-24 | End: 2022-03-28 | Stop reason: HOSPADM

## 2022-03-24 RX ORDER — OXYTOCIN/RINGER'S LACTATE 30/500 ML
87.3 PLASTIC BAG, INJECTION (ML) INTRAVENOUS AS NEEDED
Status: COMPLETED | OUTPATIENT
Start: 2022-03-24 | End: 2022-03-26

## 2022-03-24 RX ORDER — OXYTOCIN/RINGER'S LACTATE 30/500 ML
0-20 PLASTIC BAG, INJECTION (ML) INTRAVENOUS
Status: DISCONTINUED | OUTPATIENT
Start: 2022-03-24 | End: 2022-03-25

## 2022-03-24 RX ORDER — VALACYCLOVIR HYDROCHLORIDE 500 MG/1
500 TABLET, FILM COATED ORAL DAILY
COMMUNITY
End: 2022-03-28

## 2022-03-24 RX ORDER — SODIUM CHLORIDE 0.9 % (FLUSH) 0.9 %
5-40 SYRINGE (ML) INJECTION EVERY 8 HOURS
Status: DISCONTINUED | OUTPATIENT
Start: 2022-03-24 | End: 2022-03-28 | Stop reason: HOSPADM

## 2022-03-24 RX ADMIN — Medication 25 MCG: at 23:58

## 2022-03-24 RX ADMIN — ACETAMINOPHEN 650 MG: 325 TABLET ORAL at 23:42

## 2022-03-24 RX ADMIN — Medication 25 MCG: at 20:00

## 2022-03-24 NOTE — PROGRESS NOTES
1900: Bedside and Verbal shift change report given to MARGE Del Rosario RN (oncoming nurse) by Eleazar Pierre RN (offgoing nurse). Report included the following information SBAR, Kardex, MAR and Recent Results. 2000: First dose of miso given per MD orders    [id number]: Tylenol given per patient request.     0700: Bedside and Verbal shift change report given to MARGE Hill RN (oncoming nurse) by MARGE Del Rosario RN (offgoing nurse). Report included the following information SBAR, Kardex, Intake/Output, MAR and Recent Results.

## 2022-03-25 PROBLEM — O99.119 GESTATIONAL THROMBOCYTOPENIA (HCC): Status: ACTIVE | Noted: 2022-03-24

## 2022-03-25 PROBLEM — R00.2 PALPITATIONS: Status: ACTIVE | Noted: 2022-03-24

## 2022-03-25 PROBLEM — Z34.90 ENCOUNTER FOR ELECTIVE INDUCTION OF LABOR: Status: ACTIVE | Noted: 2022-03-24

## 2022-03-25 PROBLEM — D69.6 GESTATIONAL THROMBOCYTOPENIA (HCC): Status: ACTIVE | Noted: 2022-03-24

## 2022-03-25 PROBLEM — Z3A.39 39 WEEKS GESTATION OF PREGNANCY: Status: ACTIVE | Noted: 2022-03-24

## 2022-03-25 LAB
ALBUMIN SERPL-MCNC: 2.8 G/DL (ref 3.5–5)
ALBUMIN/GLOB SERPL: 0.9 {RATIO} (ref 1.1–2.2)
ALP SERPL-CCNC: 167 U/L (ref 45–117)
ALT SERPL-CCNC: 17 U/L (ref 12–78)
ANION GAP SERPL CALC-SCNC: 6 MMOL/L (ref 5–15)
AST SERPL-CCNC: 10 U/L (ref 15–37)
BASOPHILS # BLD: 0 K/UL (ref 0–0.1)
BASOPHILS NFR BLD: 0 % (ref 0–1)
BILIRUB SERPL-MCNC: 0.4 MG/DL (ref 0.2–1)
BNP SERPL-MCNC: 60 PG/ML
BUN SERPL-MCNC: 8 MG/DL (ref 6–20)
BUN/CREAT SERPL: 12 (ref 12–20)
CALCIUM SERPL-MCNC: 8.7 MG/DL (ref 8.5–10.1)
CHLORIDE SERPL-SCNC: 110 MMOL/L (ref 97–108)
CO2 SERPL-SCNC: 23 MMOL/L (ref 21–32)
COMMENT, HOLDF: NORMAL
CREAT SERPL-MCNC: 0.69 MG/DL (ref 0.55–1.02)
DIFFERENTIAL METHOD BLD: ABNORMAL
EOSINOPHIL # BLD: 0 K/UL (ref 0–0.4)
EOSINOPHIL NFR BLD: 0 % (ref 0–7)
ERYTHROCYTE [DISTWIDTH] IN BLOOD BY AUTOMATED COUNT: 14.7 % (ref 11.5–14.5)
GLOBULIN SER CALC-MCNC: 3.1 G/DL (ref 2–4)
GLUCOSE SERPL-MCNC: 110 MG/DL (ref 65–100)
HCT VFR BLD AUTO: 33.8 % (ref 35–47)
HGB BLD-MCNC: 10.9 G/DL (ref 11.5–16)
IMM GRANULOCYTES # BLD AUTO: 0.1 K/UL (ref 0–0.04)
IMM GRANULOCYTES NFR BLD AUTO: 1 % (ref 0–0.5)
LYMPHOCYTES # BLD: 0.8 K/UL (ref 0.8–3.5)
LYMPHOCYTES NFR BLD: 10 % (ref 12–49)
MCH RBC QN AUTO: 31.4 PG (ref 26–34)
MCHC RBC AUTO-ENTMCNC: 32.2 G/DL (ref 30–36.5)
MCV RBC AUTO: 97.4 FL (ref 80–99)
MONOCYTES # BLD: 0.6 K/UL (ref 0–1)
MONOCYTES NFR BLD: 7 % (ref 5–13)
NEUTS SEG # BLD: 6.9 K/UL (ref 1.8–8)
NEUTS SEG NFR BLD: 82 % (ref 32–75)
NRBC # BLD: 0 K/UL (ref 0–0.01)
NRBC BLD-RTO: 0 PER 100 WBC
PLATELET # BLD AUTO: 146 K/UL (ref 150–400)
PMV BLD AUTO: 9.6 FL (ref 8.9–12.9)
POTASSIUM SERPL-SCNC: 3.9 MMOL/L (ref 3.5–5.1)
PROT SERPL-MCNC: 5.9 G/DL (ref 6.4–8.2)
RBC # BLD AUTO: 3.47 M/UL (ref 3.8–5.2)
SAMPLES BEING HELD,HOLD: NORMAL
SODIUM SERPL-SCNC: 139 MMOL/L (ref 136–145)
TROPONIN-HIGH SENSITIVITY: <4 NG/L (ref 0–51)
WBC # BLD AUTO: 8.4 K/UL (ref 3.6–11)

## 2022-03-25 PROCEDURE — 74011250636 HC RX REV CODE- 250/636: Performed by: STUDENT IN AN ORGANIZED HEALTH CARE EDUCATION/TRAINING PROGRAM

## 2022-03-25 PROCEDURE — 93005 ELECTROCARDIOGRAM TRACING: CPT

## 2022-03-25 PROCEDURE — 36415 COLL VENOUS BLD VENIPUNCTURE: CPT

## 2022-03-25 PROCEDURE — 85025 COMPLETE CBC W/AUTO DIFF WBC: CPT

## 2022-03-25 PROCEDURE — 83880 ASSAY OF NATRIURETIC PEPTIDE: CPT

## 2022-03-25 PROCEDURE — 75410000002 HC LABOR FEE PER 1 HR: Performed by: STUDENT IN AN ORGANIZED HEALTH CARE EDUCATION/TRAINING PROGRAM

## 2022-03-25 PROCEDURE — 74011250636 HC RX REV CODE- 250/636: Performed by: OBSTETRICS & GYNECOLOGY

## 2022-03-25 PROCEDURE — 74011250637 HC RX REV CODE- 250/637: Performed by: STUDENT IN AN ORGANIZED HEALTH CARE EDUCATION/TRAINING PROGRAM

## 2022-03-25 PROCEDURE — 65270000029 HC RM PRIVATE

## 2022-03-25 PROCEDURE — 84484 ASSAY OF TROPONIN QUANT: CPT

## 2022-03-25 PROCEDURE — 80053 COMPREHEN METABOLIC PANEL: CPT

## 2022-03-25 RX ORDER — ONDANSETRON 2 MG/ML
4 INJECTION INTRAMUSCULAR; INTRAVENOUS
Status: DISCONTINUED | OUTPATIENT
Start: 2022-03-25 | End: 2022-03-25

## 2022-03-25 RX ORDER — OXYTOCIN/RINGER'S LACTATE 30/500 ML
0-20 PLASTIC BAG, INJECTION (ML) INTRAVENOUS
Status: DISCONTINUED | OUTPATIENT
Start: 2022-03-25 | End: 2022-03-28 | Stop reason: HOSPADM

## 2022-03-25 RX ORDER — NALBUPHINE HYDROCHLORIDE 10 MG/ML
10 INJECTION, SOLUTION INTRAMUSCULAR; INTRAVENOUS; SUBCUTANEOUS
Status: DISCONTINUED | OUTPATIENT
Start: 2022-03-25 | End: 2022-03-28 | Stop reason: HOSPADM

## 2022-03-25 RX ADMIN — Medication 25 MCG: at 03:59

## 2022-03-25 RX ADMIN — SODIUM CHLORIDE, POTASSIUM CHLORIDE, SODIUM LACTATE AND CALCIUM CHLORIDE 1000 ML: 600; 310; 30; 20 INJECTION, SOLUTION INTRAVENOUS at 23:41

## 2022-03-25 RX ADMIN — Medication 25 MCG: at 09:18

## 2022-03-25 RX ADMIN — Medication 25 MCG: at 13:31

## 2022-03-25 RX ADMIN — Medication 25 MCG: at 11:18

## 2022-03-25 RX ADMIN — NALBUPHINE HYDROCHLORIDE 10 MG: 10 INJECTION, SOLUTION INTRAMUSCULAR; INTRAVENOUS; SUBCUTANEOUS at 08:40

## 2022-03-25 RX ADMIN — OXYTOCIN 2 MILLI-UNITS/MIN: 10 INJECTION INTRAVENOUS at 22:32

## 2022-03-25 RX ADMIN — Medication 25 MCG: at 16:01

## 2022-03-25 RX ADMIN — SODIUM CHLORIDE, POTASSIUM CHLORIDE, SODIUM LACTATE AND CALCIUM CHLORIDE 999 ML/HR: 600; 310; 30; 20 INJECTION, SOLUTION INTRAVENOUS at 12:45

## 2022-03-25 RX ADMIN — ONDANSETRON 4 MG: 2 INJECTION INTRAMUSCULAR; INTRAVENOUS at 15:01

## 2022-03-25 RX ADMIN — SODIUM CHLORIDE, POTASSIUM CHLORIDE, SODIUM LACTATE AND CALCIUM CHLORIDE 125 ML/HR: 600; 310; 30; 20 INJECTION, SOLUTION INTRAVENOUS at 22:29

## 2022-03-25 RX ADMIN — NALBUPHINE HYDROCHLORIDE 10 MG: 10 INJECTION, SOLUTION INTRAMUSCULAR; INTRAVENOUS; SUBCUTANEOUS at 12:25

## 2022-03-25 NOTE — PROGRESS NOTES
3/25/2022  10:54 AM    CM met with CHIKI to complete initial assessment and begin discharge planning. MOB verified and confirmed demographics. CHIKI lives with her mom- Jose Camacho ( [phone]), along with her 16,8, and 5yr old, at the address on file. CHIKI is employed and plans to take 6-8wks off from work. FOB- Alyssa Rodriguez, will not be present at this time. CHIKI reports she has good family support, and feels like she has the support she needs when she returns home. CHIKI plans to breast feed baby and has pump to use at home. [facility], will provide follow up care for infant. CHIKI has car seat, bassinet/crib, clothing, bottles and all necessary supplies for baby. CHIKI has [facility] Medicaid, and will be adding baby to Medicaid. CM discussed process to add baby to insurance, CHIKI verbalized understanding. CHIKI is also enrolled in [facility] services and understands how to add baby to program.  Care Management Interventions  PCP Verified by CM: Yes [place])  Mode of Transport at Discharge:  Other (see comment)  Transition of Care Consult (CM Consult): Discharge Planning  Support Systems: Parent(s)  Confirm Follow Up Transport: Family  Discharge Location  Patient Expects to be Discharged to[de-identified] Home with family assistance  Dimitri Cummings

## 2022-03-25 NOTE — PROGRESS NOTES
Labor Note    Kimi Ringer  [id number]  1990   39w4d      S:  Feeling contractions with cytotec     O:    Visit Vitals  /62   Pulse 72   Temp 97.9 °F (36.6 °C)   Resp 16   Ht 5' 9\" (1.753 m)   Wt 85.7 kg (189 lb)   LMP 2021   SpO2 93%   Breastfeeding Yes   BMI 27.91 kg/m²      Nubain again given   Cervix unchanged 0//-2 with central 1 cm dimpling 2/2 scar tissue from LEEP  Dr. Sallie Azul at bedside with me  2 different speculums tried with gloves around the speculum to retract vaginal tissue  Cervix not able to be visualized   Manual attempt at dilation of cervix with a Loral Rakes attempted and not successful     Patient Vitals for the past 4 hrs: Mode Fetal Heart Rate Variability Decelerations Accelerations RN Reviewed Strip?   22 1200 External 130 6-25 BPM None Yes Yes   22 1130 External 135 6-25 BPM None Yes Yes   22 1100 External 125 6-25 BPM None No Yes   22 1030 External 130 6-25 BPM None Yes Yes   22 1000 External 135 6-25 BPM None Yes Yes       A/P:  28 y.o.  @ 39w4d - eIOL 2/2 scar tissue from LEEP   - GBS neg / Rh pos   - FWB:  CE[facility]  - Labor course s/p cytotec x 5. Attempt made at manual dilation of cervix x 2,s ee note from this morning and note from now. Offered patient PCEA w/ another attempt at manual dilation v 12 doses of oral cytotec before re-exam. Patient opted for cytotec. - Pain control - s/p nubain x2. Pt was offered pcea but deferred   - Anticipate . Nancy Cruz MD  [facility]      Addendum    A repeat exam was performed at 17:30 and exam was unchanged. We will continue the plan as above.   We did discuss a  if no dilation is achieved     Nancy Cruz MD  [facility]

## 2022-03-25 NOTE — PROGRESS NOTES
Labor Note    Regenia Breath  [id number]  1990   39w4d      S:  Feeling comfortable with some cramps     O:    Visit Vitals  /62   Pulse 72   Temp 97.9 °F (36.6 °C)   Resp 16   Ht 5' 9\" (1.753 m)   Wt 85.7 kg (189 lb)   LMP 2021   SpO2 93%   Breastfeeding Yes   BMI 27.91 kg/m²      Cervix 0/70/-2 with central 1 cm dimpling 2/2 scar tissue from LEEP  Nubain given  Attempted to pass cook with stylette manually, not successful   Two different speculum attempts not successful in visualizing cervix 2/2 redundant vaginal tissue  Manual attempt at Jeny dilation not successful     Patient Vitals for the past 4 hrs: Mode Fetal Heart Rate Variability Decelerations Accelerations RN Reviewed Strip?   22 1200 External 130 6-25 BPM None Yes Yes   22 1130 External 135 6-25 BPM None Yes Yes   22 1100 External 125 6-25 BPM None No Yes   22 1030 External 130 6-25 BPM None Yes Yes   22 1000 External 135 6-25 BPM None Yes Yes       A/P:  28 y.o.  @ 39w4d - labor   - GBS neg / Rh pos   - FWB:  [facility]/[place]  - Labor course s/p cytotec x 3 overnight, cervix still unchanged at 0/70/-2. See attempts at cervical dilation above. Decision made to continue cytotec, oral q2 max 12 doses   - Pain control - s/p nubain. Pt was offered pcea but deferred   - Anticipate .       Andrew Osborne MD  [facility]

## 2022-03-25 NOTE — PROGRESS NOTES
0700: Bedside and Verbal shift change report given to Amado Orozco, YING and Filipe Robertson (oncoming nurse) Slava Olivares RN  (offgoing nurse). Report included the following information SBAR, Kardex, Intake/Output, MAR and Med Rec Status. 3446: Dr. Hima Sultana at bedside. SVE cervix still closed. Continue Cytotec and reevaluate at 1200.     0820: MARGE Chatman calling Dr. Lashae Andrade and updating her on EFM tracing and that the cytotec was not given. MD stated she will be back over to attempt to place a paris bulb. 0830: Dr. Lashae Andrade at bedside discussing plan of care for cook catheter insertion for cervical ripening. Pt verbalized understanding. 1300: Dr. Lashae Andrade at bedside. SVE. Cervix remains closed. Will continue with Cytotec.

## 2022-03-25 NOTE — PROGRESS NOTES
-This RN orienting Kiki Cantrell RN, charting and care performed will be overseen by this RN.    3946: Dr. Lisa Tierney at bedside to attempt to place cook catheter. 0861: After 2 attempts place cook catheter with stylet, MD unable d/t scar tissue from previous LEEP procedure. MD attempted to dilate cervix with hayguard dilator. MD unsuccessful. Per MD give 25 mcg of cytotec orally q2 hours and will re-evaluate at noon. 1220: Dr. Lisa Tierney at bedside, SVE remaining unchanged. MD stated will premedicate pt with nubain and attempt to place cook cath again with Dr. Sallie Azul, pt agreeing with plan of care. 1245: Dr. Sallie Azul and Dr. Lisa Tierney at bedside, Both MDs unable to dilate cervix. Plan to continue with cytotec for up to a total of 12 doses. Will plan to get pt an epidural for pain relief. LR bolus started. 1300: Dr. Sallie Azul and Dr. Lisa Tierney at bedside to discuss plan of care for possibly waiting to place cook cath and see what the cytotec does instead of getting an epidural now. Pt agreeing with plan to hold off on epidural and continue with cytotec. 1445: This RN at bedside, pt reports \"chest fullness\" and \"not feeling good\". Dr. Lisa Tierney called x2 no answer will call MD again. 1455: Dr. Lisa Tierney called again, no answer. 1458: Dr. Sallie Azul made aware of pt complaints, MD stated he would come see the patient. 1501: Pt given zofran for nausea. See MAR for details. 1503: Dr. Sallie Azul at bedside to assess pt.     1508: Dr. Sallie Azul ordering labs and EKG. 1510: Labs drawn by this RN. Jaimee Castillo RN at bedside for EKG. 1515: Dr. Lisa Tierney calling back and updated on plan of care. 1525: Dr. Sallie Azul reviewing EKG. 1600: This RN at bedside, pt reports she feels a lot better. No needs voiced. 1900: Bedside and Verbal shift change report given to 200 Jose Driscoll (oncoming nurse) by Rl Orellana RN (offgoing nurse).  Report included the following information SBAR, Kardex, Intake/Output, MAR, Recent Results and Med Rec Status.

## 2022-03-25 NOTE — H&P
History & Physical    Name: Raul Martinez MRN: [id number]  SSN: [id number]    YOB: 1990  Age: 28 y.o. Sex: female        Subjective:     Estimated Date of Delivery: 3/28/22  OB History        4    Para   3    Term   3            AB        Living   3       SAB        IAB        Ectopic        Molar        Multiple   0    Live Births   3                Ms. Cannon Ohs is admitted with pregnancy at 39w3d for elective induction of labor. Feeling well. GFM, no ctx/bleeding/lof. Past Medical History:   Diagnosis Date    Anxiety     Asthma     last needed inhaler two months ago.  Gestational thrombocytopenia ([facility] [address] 3/24/2022    Headache     Herpes simplex virus (HSV) infection     Psychiatric problem     Zoloft 50mg daily     Past Surgical History:   Procedure Laterality Date    HX OTHER SURGICAL      Tonsillectomy/adnoidectomy    HX TONSILLECTOMY       Social History     Occupational History    Not on file   Tobacco Use    Smoking status: Former Smoker     Packs/day: 0.25    Smokeless tobacco: Never Used   Vaping Use    Vaping Use: Never used   Substance and Sexual Activity    Alcohol use: Not Currently    Drug use: No    Sexual activity: Yes     Partners: Male     Family History   Problem Relation Age of Onset    Other Other         Chronic Obstructive pulmonary diseas     Stroke Other     Heart Disease Other     Hypertension Other     Anemia Other     Anxiety Other     Diabetes Other     Heart Disease Mother        Allergies   Allergen Reactions    Onion Itching     Red Onions, Makes throat tingly    Vancomycin Itching     Itching and facial redness       Prior to Admission medications    Medication Sig Start Date End Date Taking? Authorizing Provider   valACYclovir (Valtrex) 500 mg tablet Take 500 mg by mouth daily.  Indications: suppression of recurrent Herpes simplex infection in HIV   Yes Provider, Historical   Classic Prenatal 28 mg iron- 800 mcg tab 12/15/21  Yes Provider, Historical   famotidine (PEPCID) 20 mg tablet Take 20 mg by mouth two (2) times a day. 1/21/22  Yes Provider, Historical   sertraline (ZOLOFT) 50 mg tablet Take 1 Tablet by mouth daily. 2/2/22  Yes Jeannie Tobar NP   albuterol (PROVENTIL HFA, VENTOLIN HFA, PROAIR HFA) 90 mcg/actuation inhaler USE 2 PUFFS BY MOUTH EVERY 4 HOURS AS NEEDED. 1/7/22  Yes Dominick Arguelles NP   ferrous sulfate 325 mg (65 mg iron) tablet  12/15/21   Provider, Historical   pyridoxine, vitamin B6, (Vitamin B-6) 100 mg tablet Take 100 mg by mouth daily. Patient not taking: Reported on 3/24/2022    Provider, Historical   hydrOXYzine pamoate (VISTARIL) 25 mg capsule Take 1 Cap by mouth three (3) times daily as needed for Anxiety. Patient not taking: Reported on 2/18/2022 3/1/21   Jeannie Tobar NP        Review of Systems: A comprehensive review of systems was negative except for that written in the HPI. Objective:     Vitals:  Vitals:    03/24/22 1754 03/24/22 1801 03/24/22 1933   BP: 125/80  119/73   Pulse: 91  90   Resp:   14   Temp:   98.2 °F (36.8 °C)   SpO2:   100%   Weight:  85.7 kg (189 lb)    Height:  5' 9\" (1.753 m)         Physical Exam:  Patient without distress.   Abdomen: soft, nontender  Fundus: firm and non tender  Cervical Exam: 0 cm dilated    70% effaced    -3 station    Lower Extremities:  - Edema No  EFW 8#  Membranes:  Intact  Fetal Heart Rate: Reactive    Cervix palpates with a central dimple likely 2/2 scar tissue from LEEP after last delivery     Prenatal Labs:   Lab Results   Component Value Date/Time    Rubella, External Immune 08/30/2021 12:00 AM    GrBStrep, External Negative 02/28/2022 12:00 AM    HBsAg, External negative 08/30/2021 12:00 AM    HIV, External Negative 08/30/2021 12:00 AM    RPR, External Non-Reactive 08/30/2021 12:00 AM    Gonorrhea, External Negative 08/02/2021 12:00 AM    Chlamydia, External Negative 08/02/2021 12:00 AM        Assessment/Plan:     Plan: Admit for elective IOL   - Rh pos / GBS neg   - Hx HSV, no active lesions, on valtrex  - Hx palpitations, s/p EKG and Echo w/ cards, cleared for vaginal delivery  - Other babies w/ arrythmia, fetal echo wnl, baby will be examined by peds after delivery via cardiac auscultation   - gestational thrombocytopenia: f/u admission labs, last PLT stable 140   - asthma no hemabate, infrequent inhaler use   - Diet reg  - Fetal well being - cefm, reactive  - IOL plan: Cytotec overnight, Plan for low dose pitocin if swati too often for cytotec up to 8.  Will re-examine in the morning for ability to place paris/cook    - Anesthesia pcea prn      Signed By:  Liam Phelps MD     March 24, 2022

## 2022-03-25 NOTE — PROGRESS NOTES
Progress Note  Date:3/25/2022       Room:206  Patient Jayjay Eisenberg     YOB: 1990     Age:32 y.o. Subjective    Subjective:  Symptoms:  (She complains of pressure in her chest.  It feels like someone is sitting on her. It is worse supine and better sitting.  ). Review of Systems  Objective         Vitals Last 24 Hours:  TEMPERATURE:  Temp  Av.1 °F (36.7 °C)  Min: 97.9 °F (36.6 °C)  Max: 98.3 °F (36.8 °C)  RESPIRATIONS RANGE: Resp  Avg: 15.6  Min: 14  Max: 16  PULSE OXIMETRY RANGE: SpO2  Av.9 %  Min: 80 %  Max: 100 %  PULSE RANGE: Pulse  Av.4  Min: 61  Max: 91  BLOOD PRESSURE RANGE: Systolic (65ULK), GEM:221 , Min:100 , HTB:785   ; Diastolic (33AUS), ZZO:25, Min:58, Max:90    I/O (24Hr): No intake or output data in the 24 hours ending 22 1556  Objective:  General Appearance: In no acute distress. Vital signs: (most recent): Blood pressure 120/75, pulse 72, temperature 98 °F (36.7 °C), resp. rate 16, height 5' 9\" (1.753 m), weight 85.7 kg (189 lb), last menstrual period 2021, SpO2 99 %, currently breastfeeding. (/75   Pulse 72   Temp 98 °F (36.7 °C)   Resp 16   Ht 5' 9\" (1.753 m)   Wt 85.7 kg (189 lb)   SpO2 99%   Breastfeeding Yes   BMI 27.91 kg/m²   ). HEENT: Normal HEENT exam.    Lungs:  Normal effort and normal respiratory rate. Breath sounds clear to auscultation. No rales, wheezes or rhonchi. Heart: Normal rate. Regular rhythm. No murmur. Abdomen: Abdomen is soft. Pulses: Distal pulses are intact. Neurological: Patient is alert and oriented to person, place and time. Pupils:  Pupils are equal, round, and reactive to light. Labs/Imaging/Diagnostics    Labs:  CBC:Recent Labs     22  1820   WBC 8.6   RBC 3.32*   HGB 10.3*   HCT 32.4*   MCV 97.6   RDW 14.5        CHEMISTRIES:No results for input(s): NA, K, CL, CO2, BUN, CA, PHOS, MG in the last 72 hours.     No lab exists for component: CREATININE, GLUCOSEPT/INR:No results for input(s): INR, INREXT in the last 72 hours. No lab exists for component: PROTIME  APTT:No results for input(s): APTT in the last 72 hours. LIVER PROFILE:No results for input(s): AST, ALT in the last 72 hours. No lab exists for component: Eliana Gianotti, ALKPHOS  Lab Results   Component Value Date/Time    ALT (SGPT) 23 12/29/2021 02:12 AM    AST (SGOT) 17 12/29/2021 02:12 AM    Alk. phosphatase 90 12/29/2021 02:12 AM    Bilirubin, total 0.3 12/29/2021 02:12 AM     EKG shows occasional PVC, prolonged QTc 501, and some ST elevation. A change from her EKG in Dec. 2021  Imaging Last 24 Hours:  No results found. Assessment//Plan     Assessment & Plan  Chest pressure,  Mild changes in EKG    Will dscontinue ondansetron and check for troponin.   Electronically signed by Karen Love MD on 3/25/2022 at 3:56 PM

## 2022-03-26 ENCOUNTER — ANESTHESIA EVENT (OUTPATIENT)
Dept: LABOR AND DELIVERY | Age: 32
End: 2022-03-26
Payer: COMMERCIAL

## 2022-03-26 ENCOUNTER — ANESTHESIA (OUTPATIENT)
Dept: LABOR AND DELIVERY | Age: 32
End: 2022-03-26
Payer: COMMERCIAL

## 2022-03-26 PROCEDURE — 74011250636 HC RX REV CODE- 250/636: Performed by: OBSTETRICS & GYNECOLOGY

## 2022-03-26 PROCEDURE — 65270000029 HC RM PRIVATE

## 2022-03-26 PROCEDURE — 74011250636 HC RX REV CODE- 250/636: Performed by: STUDENT IN AN ORGANIZED HEALTH CARE EDUCATION/TRAINING PROGRAM

## 2022-03-26 PROCEDURE — 74011000250 HC RX REV CODE- 250: Performed by: ANESTHESIOLOGY

## 2022-03-26 PROCEDURE — 74011250637 HC RX REV CODE- 250/637: Performed by: STUDENT IN AN ORGANIZED HEALTH CARE EDUCATION/TRAINING PROGRAM

## 2022-03-26 PROCEDURE — 0KQM0ZZ REPAIR PERINEUM MUSCLE, OPEN APPROACH: ICD-10-PCS | Performed by: STUDENT IN AN ORGANIZED HEALTH CARE EDUCATION/TRAINING PROGRAM

## 2022-03-26 PROCEDURE — 77030005513 HC CATH URETH FOL11 MDII -B

## 2022-03-26 PROCEDURE — 77030014125 HC TY EPDRL BBMI -B: Performed by: ANESTHESIOLOGY

## 2022-03-26 PROCEDURE — 75410000002 HC LABOR FEE PER 1 HR: Performed by: STUDENT IN AN ORGANIZED HEALTH CARE EDUCATION/TRAINING PROGRAM

## 2022-03-26 PROCEDURE — 74011000250 HC RX REV CODE- 250: Performed by: OBSTETRICS & GYNECOLOGY

## 2022-03-26 PROCEDURE — 74011250637 HC RX REV CODE- 250/637: Performed by: OBSTETRICS & GYNECOLOGY

## 2022-03-26 PROCEDURE — 74011250636 HC RX REV CODE- 250/636: Performed by: ANESTHESIOLOGY

## 2022-03-26 RX ORDER — MISOPROSTOL 200 UG/1
1000 TABLET ORAL ONCE
Status: COMPLETED | OUTPATIENT
Start: 2022-03-26 | End: 2022-03-26

## 2022-03-26 RX ORDER — BUTALBITAL, ACETAMINOPHEN AND CAFFEINE 50; 325; 40 MG/1; MG/1; MG/1
2 TABLET ORAL ONCE
Status: DISCONTINUED | OUTPATIENT
Start: 2022-03-26 | End: 2022-03-26

## 2022-03-26 RX ORDER — ONDANSETRON 2 MG/ML
4 INJECTION INTRAMUSCULAR; INTRAVENOUS
Status: DISCONTINUED | OUTPATIENT
Start: 2022-03-26 | End: 2022-03-28 | Stop reason: HOSPADM

## 2022-03-26 RX ORDER — IBUPROFEN 800 MG/1
800 TABLET ORAL EVERY 8 HOURS
Status: DISCONTINUED | OUTPATIENT
Start: 2022-03-26 | End: 2022-03-28 | Stop reason: HOSPADM

## 2022-03-26 RX ORDER — BUPIVACAINE HYDROCHLORIDE 2.5 MG/ML
INJECTION, SOLUTION EPIDURAL; INFILTRATION; INTRACAUDAL AS NEEDED
Status: DISCONTINUED | OUTPATIENT
Start: 2022-03-26 | End: 2022-03-26 | Stop reason: HOSPADM

## 2022-03-26 RX ORDER — FENTANYL/BUPIVACAINE/NS/PF 2-1250MCG
10 PREFILLED PUMP RESERVOIR EPIDURAL CONTINUOUS
Status: DISCONTINUED | OUTPATIENT
Start: 2022-03-26 | End: 2022-03-26 | Stop reason: HOSPADM

## 2022-03-26 RX ORDER — TRANEXAMIC ACID 100 MG/ML
1 INJECTION, SOLUTION INTRAVENOUS ONCE
Status: COMPLETED | OUTPATIENT
Start: 2022-03-26 | End: 2022-03-26

## 2022-03-26 RX ORDER — LIDOCAINE HYDROCHLORIDE AND EPINEPHRINE 15; 5 MG/ML; UG/ML
INJECTION, SOLUTION EPIDURAL AS NEEDED
Status: DISCONTINUED | OUTPATIENT
Start: 2022-03-26 | End: 2022-03-26 | Stop reason: HOSPADM

## 2022-03-26 RX ORDER — EPHEDRINE SULFATE/0.9% NACL/PF 50 MG/5 ML
10 SYRINGE (ML) INTRAVENOUS
Status: DISCONTINUED | OUTPATIENT
Start: 2022-03-26 | End: 2022-03-26 | Stop reason: HOSPADM

## 2022-03-26 RX ORDER — NALBUPHINE HYDROCHLORIDE 10 MG/ML
10 INJECTION, SOLUTION INTRAMUSCULAR; INTRAVENOUS; SUBCUTANEOUS
Status: DISCONTINUED | OUTPATIENT
Start: 2022-03-26 | End: 2022-03-26 | Stop reason: HOSPADM

## 2022-03-26 RX ORDER — OXYTOCIN/RINGER'S LACTATE 30/500 ML
10 PLASTIC BAG, INJECTION (ML) INTRAVENOUS AS NEEDED
Status: DISCONTINUED | OUTPATIENT
Start: 2022-03-26 | End: 2022-03-28 | Stop reason: HOSPADM

## 2022-03-26 RX ORDER — FENTANYL CITRATE 50 UG/ML
INJECTION, SOLUTION INTRAMUSCULAR; INTRAVENOUS AS NEEDED
Status: DISCONTINUED | OUTPATIENT
Start: 2022-03-26 | End: 2022-03-26 | Stop reason: HOSPADM

## 2022-03-26 RX ORDER — HYDROMORPHONE HYDROCHLORIDE 1 MG/ML
1 INJECTION, SOLUTION INTRAMUSCULAR; INTRAVENOUS; SUBCUTANEOUS ONCE
Status: ACTIVE | OUTPATIENT
Start: 2022-03-26 | End: 2022-03-27

## 2022-03-26 RX ORDER — BUTALBITAL, ACETAMINOPHEN AND CAFFEINE 50; 325; 40 MG/1; MG/1; MG/1
2 TABLET ORAL
Status: COMPLETED | OUTPATIENT
Start: 2022-03-26 | End: 2022-03-26

## 2022-03-26 RX ORDER — METHYLERGONOVINE MALEATE 0.2 MG/ML
0.2 INJECTION INTRAVENOUS ONCE
Status: COMPLETED | OUTPATIENT
Start: 2022-03-26 | End: 2022-03-26

## 2022-03-26 RX ORDER — DIPHENHYDRAMINE HYDROCHLORIDE 50 MG/ML
12.5 INJECTION, SOLUTION INTRAMUSCULAR; INTRAVENOUS
Status: DISCONTINUED | OUTPATIENT
Start: 2022-03-26 | End: 2022-03-28 | Stop reason: HOSPADM

## 2022-03-26 RX ORDER — OXYCODONE AND ACETAMINOPHEN 5; 325 MG/1; MG/1
1 TABLET ORAL
Status: DISCONTINUED | OUTPATIENT
Start: 2022-03-26 | End: 2022-03-28 | Stop reason: HOSPADM

## 2022-03-26 RX ORDER — NALOXONE HYDROCHLORIDE 0.4 MG/ML
0.4 INJECTION, SOLUTION INTRAMUSCULAR; INTRAVENOUS; SUBCUTANEOUS AS NEEDED
Status: DISCONTINUED | OUTPATIENT
Start: 2022-03-26 | End: 2022-03-28 | Stop reason: HOSPADM

## 2022-03-26 RX ORDER — DOCUSATE SODIUM 100 MG/1
100 CAPSULE, LIQUID FILLED ORAL 2 TIMES DAILY
Status: DISCONTINUED | OUTPATIENT
Start: 2022-03-26 | End: 2022-03-28 | Stop reason: HOSPADM

## 2022-03-26 RX ORDER — OXYTOCIN/RINGER'S LACTATE 30/500 ML
87.3 PLASTIC BAG, INJECTION (ML) INTRAVENOUS AS NEEDED
Status: DISCONTINUED | OUTPATIENT
Start: 2022-03-26 | End: 2022-03-28 | Stop reason: HOSPADM

## 2022-03-26 RX ADMIN — BUTALBITAL, ACETAMINOPHEN, AND CAFFEINE 2 TABLET: 50; 325; 40 TABLET ORAL at 18:18

## 2022-03-26 RX ADMIN — SODIUM CHLORIDE, POTASSIUM CHLORIDE, SODIUM LACTATE AND CALCIUM CHLORIDE 125 ML/HR: 600; 310; 30; 20 INJECTION, SOLUTION INTRAVENOUS at 08:51

## 2022-03-26 RX ADMIN — ACETAMINOPHEN 650 MG: 325 TABLET ORAL at 21:17

## 2022-03-26 RX ADMIN — WATER 2 G: 1 INJECTION INTRAMUSCULAR; INTRAVENOUS; SUBCUTANEOUS at 17:54

## 2022-03-26 RX ADMIN — BUPIVACAINE HYDROCHLORIDE 2.5 MG: 2.5 INJECTION, SOLUTION EPIDURAL; INFILTRATION; INTRACAUDAL; PERINEURAL at 01:07

## 2022-03-26 RX ADMIN — ACETAMINOPHEN 650 MG: 325 TABLET ORAL at 17:10

## 2022-03-26 RX ADMIN — FENTANYL CITRATE 75 MCG: 50 INJECTION, SOLUTION INTRAMUSCULAR; INTRAVENOUS at 01:14

## 2022-03-26 RX ADMIN — OXYTOCIN 87.3 MILLI-UNITS/MIN: 10 INJECTION INTRAVENOUS at 13:16

## 2022-03-26 RX ADMIN — SODIUM CHLORIDE, POTASSIUM CHLORIDE, SODIUM LACTATE AND CALCIUM CHLORIDE 125 ML/HR: 600; 310; 30; 20 INJECTION, SOLUTION INTRAVENOUS at 00:54

## 2022-03-26 RX ADMIN — ACETAMINOPHEN 650 MG: 325 TABLET ORAL at 07:39

## 2022-03-26 RX ADMIN — TRANEXAMIC ACID 1000 MG: 1 INJECTION, SOLUTION INTRAVENOUS at 13:40

## 2022-03-26 RX ADMIN — MISOPROSTOL 1000 MCG: 200 TABLET ORAL at 13:38

## 2022-03-26 RX ADMIN — IBUPROFEN 800 MG: 800 TABLET, FILM COATED ORAL at 14:23

## 2022-03-26 RX ADMIN — FENTANYL CITRATE 25 MCG: 50 INJECTION, SOLUTION INTRAMUSCULAR; INTRAVENOUS at 01:07

## 2022-03-26 RX ADMIN — LIDOCAINE HYDROCHLORIDE AND EPINEPHRINE 3.5 ML: 15; 5 INJECTION, SOLUTION EPIDURAL at 01:14

## 2022-03-26 RX ADMIN — METHYLERGONOVINE MALEATE 0.2 MG: 0.2 INJECTION, SOLUTION INTRAMUSCULAR; INTRAVENOUS at 13:04

## 2022-03-26 RX ADMIN — Medication 10 ML/HR: at 01:48

## 2022-03-26 RX ADMIN — Medication 10 ML/HR: at 09:29

## 2022-03-26 NOTE — PROGRESS NOTES
Labor Progress Note  Patient seen, fetal heart rate and contraction pattern evaluated, patient examined. Patient is comfortable with epidural.  Visit Vitals  BP (!) 110/56   Pulse 69   Temp 99.3 °F (37.4 °C)   Resp 16   Ht 5' 9\" (1.753 m)   Wt 85.7 kg (189 lb)   SpO2 (!) 85%   Breastfeeding Yes   BMI 27.91 kg/m²       Physical Exam:    28 y.o.  in no acute distress. Cervical Exam:   Presentation Vertex  Dilation 4 cms   Effacement 100 %   Station -1  Membranes: Spontaneous rupture of membranes Clear  Uterine Activity:   Frequency: Every 2 minutes   Duration: 75 seconds   Intensity: Moderate  Fetal Heart Rate:    Baseline: 130 bpm   Variability: Moderate   Accelerations: Present (15 x 15 bpm)   Decelerations: None  Category: 1    Oxytocin (krystina-units/minute): 4    No results found for this or any previous visit (from the past 12 hour(s)).   Assessment/Plan:  Still latent phase, but labor is progressing  Continue oxytocin  Nikki Adkins MD  3/26/2022

## 2022-03-26 NOTE — PROGRESS NOTES
The patient was evaluated secondary to persistent vaginal trickling of blood after . Abdomen: firm uterus at the level of the umbilicus  Bimanual pelvic exam: small clot evacuated from the cervix, no large significant bleeding or clots noted  Uterine massage continued with only scant vaginal bleeding noted. Cytotec 1000 mcg MN administered along with 1 gram of TXA iv. Prior to my arrival Methergine 0.2 mg IM was administered and straiight catheterization of bladder was performed with 450 mL of urine.  mL. Ass/Plan: S/P  with mild uterine atony  Uterotonics administered along with 1 gm if TXA. Pitocin 30 units in 500 mL NS at 125 ml/hr. Patient made aware that if her bleeding should increase again that a paris catheter would be placed. Continue to monitor on labor and delivery for at least 3 hrs.

## 2022-03-26 NOTE — ANESTHESIA PROCEDURE NOTES
CSE Block    Start time: 3/26/2022 12:59 AM  End time: 3/26/2022 12:19 AM  Performed by: Antonette Deras DO  Authorized by: Antonette Deras DO     Pre-Procedure  Indications: procedure for pain    preanesthetic checklist: patient identified, risks and benefits discussed, anesthesia consent, patient being monitored and timeout performed    Timeout Time: 00:58 EDT        Procedure:   Patient Position:  Seated  Prep Region:  Lumbar  Prep: Betadine    Location:  L3-4    Epidural Needle:   Needle Type:  Tuohy  Needle Gauge:  17 G  Injection Technique:  Loss of resistance using air  Attempts:  1    Spinal Needle:   Needle Type:  Pencan  Needle Gauge:  25 G    Catheter:   Catheter Type:  Standard  Catheter Size:  20 G  Catheter at Skin Depth (cm):  8  Depth in Epidural Space (cm):  2.5  Events: no blood with aspiration, no cerebrospinal fluid with aspiration, no paresthesia and negative aspiration test    Test Dose:  Negative    Assessment:   Catheter Secured:  Tegaderm and tape  Insertion:  Uncomplicated  Patient tolerance:  Patient tolerated the procedure well with no immediate complications

## 2022-03-26 NOTE — PROGRESS NOTES
Labor Progress Note  Patient seen, fetal heart rate and contraction pattern evaluated, patient examined. Patient has frequent mild contractions. Visit Vitals  BP (!) 102/55   Pulse 79   Temp 98.6 °F (37 °C)   Resp 16   Ht 5' 9\" (1.753 m)   Wt 85.7 kg (189 lb)   SpO2 98%   Breastfeeding Yes   BMI 27.91 kg/m²       Physical Exam:    28 y.o.  in no acute distress. Cervical Exam:   Presentation Vertex  Dilation 0 cms   Effacement 100 %   Station -1  Membranes: Intact  Uterine Activity:   Frequency: Every 2 - 3 minutes   Duration: 60 seconds   Intensity: Mild  Fetal Heart Rate:    Baseline: 130 bpm   Variability: Moderate   Accelerations: Present (15 x 15 bpm)   Decelerations: None  Category: 1    Oxytocin (krystina-units/minute): 0      Recent Results (from the past 12 hour(s))   CBC WITH AUTOMATED DIFF    Collection Time: 22  3:11 PM   Result Value Ref Range    WBC 8.4 3.6 - 11.0 K/uL    RBC 3.47 (L) 3.80 - 5.20 M/uL    HGB 10.9 (L) 11.5 - 16.0 g/dL    HCT 33.8 (L) 35.0 - 47.0 %    MCV 97.4 80.0 - 99.0 FL    MCH 31.4 26.0 - 34.0 PG    MCHC 32.2 30.0 - 36.5 g/dL    RDW 14.7 (H) 11.5 - 14.5 %    PLATELET 793 (L) 948 - 400 K/uL    MPV 9.6 8.9 - 12.9 FL    NRBC 0.0 0  WBC    ABSOLUTE NRBC 0.00 0.00 - 0.01 K/uL    NEUTROPHILS 82 (H) 32 - 75 %    LYMPHOCYTES 10 (L) 12 - 49 %    MONOCYTES 7 5 - 13 %    EOSINOPHILS 0 0 - 7 %    BASOPHILS 0 0 - 1 %    IMMATURE GRANULOCYTES 1 (H) 0.0 - 0.5 %    ABS. NEUTROPHILS 6.9 1.8 - 8.0 K/UL    ABS. LYMPHOCYTES 0.8 0.8 - 3.5 K/UL    ABS. MONOCYTES 0.6 0.0 - 1.0 K/UL    ABS. EOSINOPHILS 0.0 0.0 - 0.4 K/UL    ABS. BASOPHILS 0.0 0.0 - 0.1 K/UL    ABS. IMM.  GRANS. 0.1 (H) 0.00 - 0.04 K/UL    DF AUTOMATED     METABOLIC PANEL, COMPREHENSIVE    Collection Time: 22  3:11 PM   Result Value Ref Range    Sodium 139 136 - 145 mmol/L    Potassium 3.9 3.5 - 5.1 mmol/L    Chloride 110 (H) 97 - 108 mmol/L    CO2 23 21 - 32 mmol/L    Anion gap 6 5 - 15 mmol/L    Glucose 110 (H) 65 - 100 mg/dL    BUN 8 6 - 20 MG/DL    Creatinine 0.69 0.55 - 1.02 MG/DL    BUN/Creatinine ratio 12 12 - 20      GFR est AA >60 >60 ml/min/1.73m2    GFR est non-AA >60 >60 ml/min/1.73m2    Calcium 8.7 8.5 - 10.1 MG/DL    Bilirubin, total 0.4 0.2 - 1.0 MG/DL    ALT (SGPT) 17 12 - 78 U/L    AST (SGOT) 10 (L) 15 - 37 U/L    Alk. phosphatase 167 (H) 45 - 117 U/L    Protein, total 5.9 (L) 6.4 - 8.2 g/dL    Albumin 2.8 (L) 3.5 - 5.0 g/dL    Globulin 3.1 2.0 - 4.0 g/dL    A-G Ratio 0.9 (L) 1.1 - 2.2     TROPONIN-HIGH SENSITIVITY    Collection Time: 03/25/22  3:11 PM   Result Value Ref Range    Troponin-High Sensitivity <4 0 - 51 ng/L   NT-PRO BNP    Collection Time: 03/25/22  3:11 PM   Result Value Ref Range    NT pro-BNP 60 <125 PG/ML   SAMPLES BEING HELD    Collection Time: 03/25/22  3:11 PM   Result Value Ref Range    SAMPLES BEING HELD 1SST     COMMENT        Add-on orders for these samples will be processed based on acceptable specimen integrity and analyte stability, which may vary by analyte. EKG, 12 LEAD, INITIAL    Collection Time: 03/25/22  3:15 PM   Result Value Ref Range    Ventricular Rate 78 BPM    Atrial Rate 78 BPM    P-R Interval 160 ms    QRS Duration 92 ms    Q-T Interval 440 ms    QTC Calculation (Bezet) 501 ms    Calculated P Axis 32 degrees    Calculated R Axis -6 degrees    Calculated T Axis 1 degrees    Diagnosis       Sinus rhythm with sinus arrhythmia with occasional premature ventricular   complexes  Minimal voltage criteria for LVH, may be normal variant  ST elevation, consider early repolarization, pericarditis, or injury  Prolonged QT  Abnormal ECG  When compared with ECG of 28-DEC-2021 23:57,  premature ventricular complexes are now present  RSR' pattern in V1 is no longer present       Assessment &Plan  Cervix is soft and effaced.   Anthony frequently  Will start oxytocin and repeat exam in 4 hours after epidural.  Xiomy Calloway MD  3/25/2022

## 2022-03-26 NOTE — PROGRESS NOTES
The patient complains of a severe frontal headache that has not improved with Ibuprofen and Tylenol. She reports that while laying down she did not notice a headache and that it occurred immediately after she stood up and started moving around. She does have a h/o migraine headaches but feels that her current HA is different. Visit Vitals  /68   Pulse 69   Temp 98 °F (36.7 °C)   Resp 16   Ht 5' 9\" (1.753 m)   Wt 85.7 kg (189 lb)   SpO2 100%   Breastfeeding Yes   BMI 27.91 kg/m²     Ass/Plan: PPD 0 s/p  with headache that is likely secondary a spinal HA  Fioricet 2 po x 1 dose now and if no improvement with Fioricet will consult anesthesia.

## 2022-03-26 NOTE — PROGRESS NOTES
1900- SBAR from [address] [place] [address] Offer on phone and asked to come to bedside for evaluation     2200- Juarez at bedside     2202- SVE per MD 0/100/-1. MD manually stretches cervix     2341- Bolus for epidural starts at this time     Clari Skaggs MD called to bedside to epidural placement     0037- Pt ambulates to the restroom before epidural placement     0143- Pt sitting on side of bed for epidural placement. This RN attempts to monitor baby    Bel Bedolla at bedside for epidural placement     0058- Time out for epidural     0100- SROM at this time for clear fluid    0112- Pt laid back in bed after epidural placement with pillow used as bump under left side of back     Lissa Gil MD called to bedside for reassessment     Sandee Hawkins MD at bedside     0137- Pt in [facility] and MD uses speculum and Tierney's to break up scar tissue around cervix    0150- After manipulation of cervix MD states that patient is now 2/100/-1.     0154- MD leaves bedside.  Pt turned to the right side with peanut ball between legs     0635- Pt given epidural bolus button to help with pressure in rectum    0640- SVE per MD 4/100/-1.     0700- SBAR to RN

## 2022-03-26 NOTE — PROGRESS NOTES
0715 All pt care and charting done by Angy Arias RN overseen by this RN. 1720 Tylenol 650mg given for headache. Fundus firm at umbilicus -1 with small amount of lochia. QBL since 1330 is 50ml. VSS. 1730 Pt informed if ha does not get better following Tylenol she can take Fioricet 2 tablets. 36 Dr Janny Angeles informed fundus remains firm with small amount of lochia during fundal exams and QBL past 4hrs has been 50ml. Aware pt has HA and had elevated bp's 140/70's/ 150/70's following methergine that have returned to normal.  Pt to receive Fiorecet 2 tablets if ha not relieved by Tylenol. Pt to receive Ancef 2gm x1. Pt to be transferred to [facility]U. [place] to be dc'd at 2100 per Dr Janny Angeles. [address] done/pad and gown changed. Pt up to wheelchair with steady gait. Pt c/o worsening headache upon standing. Dr Janny Angeles aware. 1818 Pt given Fioricet 2 tablets po. Dr Janny Angeles to be notified if headache does not resolve following Fioricet. 1826 TRANSFER - OUT REPORT:    Verbal report given to Nicolas Resendez RN(name) on Prescott Dakins  being transferred to MIU(unit) for routine progression of care       Report consisted of patients Situation, Background, Assessment and   Recommendations(SBAR). Information from the following report(s) SBAR, Intake/Output, MAR and Recent Results was reviewed with the receiving nurse. Lines:   Peripheral IV 03/24/22 Anterior;Right Hand (Active)   Site Assessment Clean, dry, & intact 03/26/22 0718   Phlebitis Assessment 0 03/26/22 0718   Infiltration Assessment 0 03/26/22 0718   Dressing Status Clean, dry, & intact 03/26/22 0718   Dressing Type Tape;Transparent;Non-adherent dressing 03/26/22 0718   Hub Color/Line Status Pink; Infusing 03/26/22 0718   Alcohol Cap Used Yes 03/26/22 0156        Opportunity for questions and clarification was provided.       Patient transported with:   Registered Nurse

## 2022-03-26 NOTE — PROGRESS NOTES
Labor Progress Note  Patient seen, fetal heart rate and contraction pattern evaluated at 0920.  Comfortable with epidural.     Physical Exam:  Vitals:   Vitals:    03/26/22 0832 03/26/22 0837 03/26/22 0842 03/26/22 0934   BP:  118/68     Pulse:  84     Resp:       Temp:    97.9 °F (36.6 °C)   SpO2: 100% 100% 100%    Weight:       Height:             Cervical Exam was examined  6-7 cm dilated    100% effaced    0 station    Presenting Part: cephalic    Uterine Activity[de-identified] Frequency: Every 3 minutes  Fetal Heart Rate: Reactive  Baseline: 140 per minute  Variability: moderate  Accelerations: yes  Decelerations: none  Pitocin: 6mu/min     Assessment/Plan:  Ms. Bhupinder Stovall is admitted with pregnancy at 39w5d undergoing IOL     Labor progressing normally  Anticipate vaginal delivery     Jett Perez DO  3/26/2022  9:39 AM

## 2022-03-26 NOTE — PROGRESS NOTES
Labor Progress Note  Patient seen, fetal heart rate and contraction pattern evaluated at 1100.  Feeling slight pressure    Physical Exam:  Vitals:   Vitals:    03/26/22 1018 03/26/22 1023 03/26/22 1028 03/26/22 1035   BP: 113/72      Pulse: 72      Resp:       Temp:    98.3 °F (36.8 °C)   SpO2: 100% 100% 100%    Weight:       Height:             Cervical Exam was examined  8 cm dilated    100% effaced    0 station    Presenting Part: cephalic    Uterine Activity[de-identified] Frequency: Every 3 minutes  Fetal Heart Rate: Reactive  Baseline: 125 per minute  Variability: moderate  Accelerations: yes  Decelerations: none  Pitocin: 6mu/min     Assessment/Plan:  Ms. Gay Weeks is admitted with pregnancy at 39w5d undergoing IOL      Labor progressing normally  Anticipate vaginal delivery     Reynaldo Potter DO

## 2022-03-26 NOTE — PROGRESS NOTES
Labor Progress Note  Patient seen, fetal heart rate and contraction pattern evaluated, patient examined. Patient is comfortable with epidural.  Visit Vitals  BP (!) 112/58   Pulse 79   Temp 98.3 °F (36.8 °C)   Resp 16   Ht 5' 9\" (1.753 m)   Wt 85.7 kg (189 lb)   SpO2 100%   Breastfeeding Yes   BMI 27.91 kg/m²       Physical Exam:    28 y.o.  in no acute distress. Cervical Exam:   Presentation Vertex  Dilation 2 cms   Effacement 100 %   Station -2  Membranes: Spontaneous rupture of membranes Clear  Uterine Activity:   Frequency: Every 2 minutes   Duration: 60 seconds   Intensity: Moderate  Fetal Heart Rate:    Baseline: 130 bpm   Variability: Moderate   Accelerations: Present (15 x 15 bpm)   Decelerations: None  Category: 1    Oxytocin (krystina-units/minute): 2    Procedure: I used a long willie to help open the cervix, which is now 2 cms    Recent Results (from the past 12 hour(s))   CBC WITH AUTOMATED DIFF    Collection Time: 22  3:11 PM   Result Value Ref Range    WBC 8.4 3.6 - 11.0 K/uL    RBC 3.47 (L) 3.80 - 5.20 M/uL    HGB 10.9 (L) 11.5 - 16.0 g/dL    HCT 33.8 (L) 35.0 - 47.0 %    MCV 97.4 80.0 - 99.0 FL    MCH 31.4 26.0 - 34.0 PG    MCHC 32.2 30.0 - 36.5 g/dL    RDW 14.7 (H) 11.5 - 14.5 %    PLATELET 248 (L) 930 - 400 K/uL    MPV 9.6 8.9 - 12.9 FL    NRBC 0.0 0  WBC    ABSOLUTE NRBC 0.00 0.00 - 0.01 K/uL    NEUTROPHILS 82 (H) 32 - 75 %    LYMPHOCYTES 10 (L) 12 - 49 %    MONOCYTES 7 5 - 13 %    EOSINOPHILS 0 0 - 7 %    BASOPHILS 0 0 - 1 %    IMMATURE GRANULOCYTES 1 (H) 0.0 - 0.5 %    ABS. NEUTROPHILS 6.9 1.8 - 8.0 K/UL    ABS. LYMPHOCYTES 0.8 0.8 - 3.5 K/UL    ABS. MONOCYTES 0.6 0.0 - 1.0 K/UL    ABS. EOSINOPHILS 0.0 0.0 - 0.4 K/UL    ABS. BASOPHILS 0.0 0.0 - 0.1 K/UL    ABS. IMM.  GRANS. 0.1 (H) 0.00 - 0.04 K/UL    DF AUTOMATED     METABOLIC PANEL, COMPREHENSIVE    Collection Time: 22  3:11 PM   Result Value Ref Range    Sodium 139 136 - 145 mmol/L    Potassium 3.9 3.5 - 5.1 mmol/L Chloride 110 (H) 97 - 108 mmol/L    CO2 23 21 - 32 mmol/L    Anion gap 6 5 - 15 mmol/L    Glucose 110 (H) 65 - 100 mg/dL    BUN 8 6 - 20 MG/DL    Creatinine 0.69 0.55 - 1.02 MG/DL    BUN/Creatinine ratio 12 12 - 20      GFR est AA >60 >60 ml/min/1.73m2    GFR est non-AA >60 >60 ml/min/1.73m2    Calcium 8.7 8.5 - 10.1 MG/DL    Bilirubin, total 0.4 0.2 - 1.0 MG/DL    ALT (SGPT) 17 12 - 78 U/L    AST (SGOT) 10 (L) 15 - 37 U/L    Alk. phosphatase 167 (H) 45 - 117 U/L    Protein, total 5.9 (L) 6.4 - 8.2 g/dL    Albumin 2.8 (L) 3.5 - 5.0 g/dL    Globulin 3.1 2.0 - 4.0 g/dL    A-G Ratio 0.9 (L) 1.1 - 2.2     TROPONIN-HIGH SENSITIVITY    Collection Time: 03/25/22  3:11 PM   Result Value Ref Range    Troponin-High Sensitivity <4 0 - 51 ng/L   NT-PRO BNP    Collection Time: 03/25/22  3:11 PM   Result Value Ref Range    NT pro-BNP 60 <125 PG/ML   SAMPLES BEING HELD    Collection Time: 03/25/22  3:11 PM   Result Value Ref Range    SAMPLES BEING HELD 1SST     COMMENT        Add-on orders for these samples will be processed based on acceptable specimen integrity and analyte stability, which may vary by analyte. EKG, 12 LEAD, INITIAL    Collection Time: 03/25/22  3:15 PM   Result Value Ref Range    Ventricular Rate 78 BPM    Atrial Rate 78 BPM    P-R Interval 160 ms    QRS Duration 92 ms    Q-T Interval 440 ms    QTC Calculation (Bezet) 501 ms    Calculated P Axis 32 degrees    Calculated R Axis -6 degrees    Calculated T Axis 1 degrees    Diagnosis       Sinus rhythm with sinus arrhythmia with occasional premature ventricular   complexes  Minimal voltage criteria for LVH, may be normal variant  ST elevation, consider early repolarization, pericarditis, or injury  Prolonged QT  Abnormal ECG  When compared with ECG of 28-DEC-2021 23:57,  premature ventricular complexes are now present  RSR' pattern in V1 is no longer present       Assessment/Plan:  Cervix is opening. Continue with oxytocin.   Jessie Mitchell, MD  3/26/2022

## 2022-03-26 NOTE — L&D DELIVERY NOTE
Delivery Note:     Patient reached FD and pushed with good effort to deliver the fetal head in JONATHAN position. Nuchal cord found x 1 and reduced. The anterior shoulder, followed by the posterior shoulder and the rest of the body then delivered easily. This was a female with Apgars of 8 and 9 at 1 and 5 minutes respectively, weight pending. The infant was bulb suctioned and placed on mom's abdomen. The cord was then double clamped and cut by the grandmother. Cord blood was collected. The placenta followed spontaneously, intact, with 3VC. Pitocin was added to the IVF and the fundus was firm to palpation. The vagina, cervix and perineum were examined and 2nd degree laceration was found and repaired using a running suture of 2-0 vicryl.  mL. No complications. Mom and baby doing well.       Dawna Garzon [facility]

## 2022-03-26 NOTE — PROGRESS NOTES
0700 Bedside and Verbal shift change report given to [address] (oncoming nurse) by Russell Beach RN (offgoing nurse). Report included the following information SBAR, Procedure Summary, Intake/Output, MAR and Recent Results. 1468 Patient turned to lateral right with peanut ball between legs. 0815 Pt repositioned into high fowlers with legs butterfly. 0930 Dr. Coreen Jones at bedside SVE 6-7/100/0. We will continue to monitor. 1030 Pt repositioned to lateral right with peanut ball between legs. 1100 Dr. Coreen Jones at bedside, SVE 8/100/0    1110 Pt reposition to high fowlers with legs butterfly. 1157 SVE by Asher Wong and this RN    1200 Dr. Coreen Jones at bedside    [phone] Patient actively pushing. RN remains in continuous attendance at the bedside. Assessment & evaluation of fetal heart rate ongoing via continuous EFM. [address] remained at bedside throughout pushing. EFM continuously assessed. Vaginal delivery of viable infant. 1220 Placenta delivered     1220 QBL 200mL after delivery     1257 Dr. Shoshana Grant called about pt heavy bleeding. 1304 Methergine 0.2mg/ml given per MD ordered. 1325 additional      1333 Dr. Shoshana Grant called to bedside     1336 Dr. Shoshana Grant at bedside, postpartum hemorrhage protocol used. Bimanual pelvic exam performed by MD. Cytotec 1000 mcg NV administered along with 1 gram of TXA iv. QBL 16mL     1405 [place] catheter placed per Dr. Shoshana Grant order. 12 Pt is alert and oriented, vital signs within patient normal limits. We will continue to monitor. 1517 Bedside and Verbal shift change report given to Asher Wong  (oncoming nurse) by Evans Rush RN (offgoing nurse). Report included the following information SBAR, Procedure Summary, Intake/Output, MAR and Recent Results.

## 2022-03-26 NOTE — ANESTHESIA PREPROCEDURE EVALUATION
Relevant Problems   NEUROLOGY   (+) Generalized anxiety disorder       Anesthetic History               Review of Systems / Medical History  Nursing notes reviewed    Pulmonary            Asthma : well controlled    Comments: Previous smoker   Neuro/Psych         Psychiatric history    Comments: Anxiety/depression Cardiovascular            Dysrhythmias : SVT      Exercise tolerance: >4 METS     GI/Hepatic/Renal                Endo/Other  Within defined limits           Other Findings   Comments: Previous epidural for last delivery ineffective           Physical Exam    Airway  Mallampati: II    Neck ROM: normal range of motion   Mouth opening: Normal     Cardiovascular    Rhythm: regular  Rate: normal         Dental  No notable dental hx       Pulmonary  Breath sounds clear to auscultation               Abdominal         Other Findings            Anesthetic Plan    ASA: 2  Anesthesia type: epidural and spinal            Anesthetic plan and risks discussed with: Patient      Informed consent obtained.

## 2022-03-26 NOTE — DISCHARGE SUMMARY
Patient ID:  Lexii Jordan  [id number]  52 y.o.  1990    Admit Date: 3/24/2022    Discharge Date: 3/28/2022     Admitting Physician: Janey Porter MD    Attending Physician: Florencia Hinds MD    Admission Diagnoses: 39 weeks gestation of pregnancy [Z3A.39]  Encounter for elective induction of labor [Z34.90]  Gestational thrombocytopenia ([address] [O99.119, D69.6]  Palpitations [R00.2]    Procedures for this admission: Induction of labor, TSVD     Discharge Diagnoses: Same as above with TSVD producing a viable infant. Information for the patient's :  Pilar Dominguez [[id number]]      2nd degree  Dr. Nathan Pierson delivered      Discharge Disposition:  home    Discharge Condition:  stable    Additional Diagnoses: Gestational thrombocytopenia. Maternal Labs:   Lab Results   Component Value Date/Time    HBsAg, External negative 2021 12:00 AM    HIV, External Negative 2021 12:00 AM    Rubella, External Immune 2021 12:00 AM    RPR, External Non-Reactive 2021 12:00 AM    GrBStrep, External Negative 2022 12:00 AM       Cord Blood Results:   Information for the patient's :  Pilar Dominguez [[id number]]   No results found for: PCTABR, ABORH, PCTDIG, BILI, ABORH, ABORHEXT           History of Present Illness:   OB History        4    Para   3    Term   3            AB        Living   3       SAB        IAB        Ectopic        Molar        Multiple   0    Live Births   3              Admitted for induction of labor. Hospital Course:   Patient was admitted as above and delivered via TSVD . Please the chart for details. The postpartum course was unremarkable. She was deemed stable for discharge home on day 2. Follow-up Care:  Follow up with Dr. Jimena Thomas in 4-6wks     Signed:  Adelaide Rothman DO  3/26/2022  12:31 PM

## 2022-03-27 LAB
ATRIAL RATE: 78 BPM
CALCULATED P AXIS, ECG09: 32 DEGREES
CALCULATED R AXIS, ECG10: -6 DEGREES
CALCULATED T AXIS, ECG11: 1 DEGREES
DIAGNOSIS, 93000: NORMAL
P-R INTERVAL, ECG05: 160 MS
Q-T INTERVAL, ECG07: 440 MS
QRS DURATION, ECG06: 92 MS
QTC CALCULATION (BEZET), ECG08: 501 MS
VENTRICULAR RATE, ECG03: 78 BPM

## 2022-03-27 PROCEDURE — 74011250637 HC RX REV CODE- 250/637: Performed by: STUDENT IN AN ORGANIZED HEALTH CARE EDUCATION/TRAINING PROGRAM

## 2022-03-27 PROCEDURE — 65270000029 HC RM PRIVATE

## 2022-03-27 PROCEDURE — 2709999900 HC NON-CHARGEABLE SUPPLY

## 2022-03-27 PROCEDURE — 74011250637 HC RX REV CODE- 250/637: Performed by: OBSTETRICS & GYNECOLOGY

## 2022-03-27 RX ORDER — SERTRALINE HYDROCHLORIDE 50 MG/1
50 TABLET, FILM COATED ORAL DAILY
Status: DISCONTINUED | OUTPATIENT
Start: 2022-03-27 | End: 2022-03-28 | Stop reason: HOSPADM

## 2022-03-27 RX ADMIN — IBUPROFEN 800 MG: 800 TABLET, FILM COATED ORAL at 18:37

## 2022-03-27 RX ADMIN — SERTRALINE 50 MG: 50 TABLET, FILM COATED ORAL at 16:28

## 2022-03-27 RX ADMIN — IBUPROFEN 800 MG: 800 TABLET, FILM COATED ORAL at 09:47

## 2022-03-27 RX ADMIN — ACETAMINOPHEN 650 MG: 325 TABLET ORAL at 01:21

## 2022-03-27 RX ADMIN — DOCUSATE SODIUM 100 MG: 100 CAPSULE, LIQUID FILLED ORAL at 18:37

## 2022-03-27 RX ADMIN — IBUPROFEN 800 MG: 800 TABLET, FILM COATED ORAL at 01:21

## 2022-03-27 RX ADMIN — ACETAMINOPHEN 650 MG: 325 TABLET ORAL at 20:02

## 2022-03-27 RX ADMIN — DOCUSATE SODIUM 100 MG: 100 CAPSULE, LIQUID FILLED ORAL at 09:46

## 2022-03-27 RX ADMIN — ACETAMINOPHEN 650 MG: 325 TABLET ORAL at 15:15

## 2022-03-27 RX ADMIN — ACETAMINOPHEN 650 MG: 325 TABLET ORAL at 23:05

## 2022-03-27 NOTE — ROUTINE PROCESS
Bedside and Verbal shift change report given to Mateusz Olivier RN (oncoming nurse) by Danilo Mesa RN (offgoing nurse). Report included the following information SBAR, Kardex, Intake/Output, MAR and Accordion.

## 2022-03-27 NOTE — PROGRESS NOTES
Asked to see patient regarding headache that began after delivery this afternoon. Patient reports severe headache around the top of her head that feels like a vice squeezing her head. She reports headache started when she stood up after delivery to be moved to another room and is severe upon sitting up/standing. It is improved when she lies down to a more tolerable pain level. It was not improved with Fioricet. Described that we would not offer invasive measures for the headache until 24 hours from onset but went into detail describing the CSE, PDPH, and epidural blood patch vs conservative measures. Patient does not believe at this time that she would want an epidural blood patch and thus will elect to continue fluids, caffeine, and motrin/tylenol. We are available if patient has further questions or reconsiders the blood patch after 24 hours from onset.

## 2022-03-27 NOTE — PROGRESS NOTES
Notified  of pt's continued headache after Fioricet dose. MD ordered to consult anesthesia. 2020- Dr. Martínez Kerr notified of pt's severe headache despite medicine and 's consult. 2025- MD at bedside assessing pt. MD recommends waiting for blood patch until at least 24 hours. Patient declines wanting the blood patch at this time. Will continue with Motrin and Tylenol, as well as caffeine. Dr. Martínez Kerr will inform  to assess patient tomorrow. 2030- Pepsi given to patient, per patient request.     2117- Tylenol given to patient at this time. Water refilled and encouraged po fluids. 2120- Pt sat on side of bed for a few minutes before standing and complained that her headache \"hurt so bad\". Upon standing, pt grabbed her head and grimaced. Ambulated to bathroom, where paris catheter was discontinued and reina care was performed. Pt did not complain of head pain, but complains of cold shakes while sitting on toilet. Ambulated back to bed without difficulty. Laying back in bed at this time with multiple blankets and thermostat temperature increased. 0150- Pt ambulates to bathroom with assistance. When pt is asked how her headache was, pt states while standing in bathroom \"it is not bad at all\". Voids without difficulty. Ambulates back to bed and sitting on side of bed.

## 2022-03-27 NOTE — PROGRESS NOTES
Post-Partum Day Number 1 Progress Note    Chelita Torres       Information for the patient's :  Bridgett Tellez [[id number]]   Vaginal, Spontaneous    Patient doing well without significant complaint. Voiding without difficulty, normal lochia. Tolerating diet without nausea or vomiting. Pain controlled with oral medications. Still with mild HA but it is improving. Vitals:  Visit Vitals  /73   Pulse 85   Temp 97.7 °F (36.5 °C)   Resp 17   Ht 5' 9\" (1.753 m)   Wt 85.7 kg (189 lb)   LMP 2021   SpO2 96%   Breastfeeding Yes   BMI 27.91 kg/m²     Temp (24hrs), Av.2 °F (36.8 °C), Min:97.7 °F (36.5 °C), Max:98.6 °F (37 °C)      Exam:   Patient without distress.                 FF @ U-2 NT                LE NT w/o edema    Labs:     Lab Results   Component Value Date/Time    WBC 8.4 2022 03:11 PM    WBC 8.6 2022 06:20 PM    WBC 8.2 2021 02:12 AM    WBC 6.4 2021 07:45 PM    WBC 7.1 2021 12:46 PM    WBC 7.2 2016 07:03 AM    WBC 9.0 2010 06:30 AM    HGB 10.9 (L) 2022 03:11 PM    HGB 10.3 (L) 2022 06:20 PM    HGB 10.4 (L) 2021 02:12 AM    HGB 12.4 2021 07:45 PM    HGB 13.5 2021 12:46 PM    HGB 11.5 2016 07:03 AM    HGB 10.6 (L) 06/15/2010 11:10 AM    HGB 10.9 (L) 2010 06:30 AM    HCT 33.8 (L) 2022 03:11 PM    HCT 32.4 (L) 2022 06:20 PM    HCT 31.4 (L) 2021 02:12 AM    HCT 37.5 2021 07:45 PM    HCT 41.1 2021 12:46 PM    HCT 36.1 2016 07:03 AM    HCT 33.0 (L) 06/15/2010 11:10 AM    HCT 32.9 (L) 2010 06:30 AM    PLATELET 329 (L)  03:11 PM    PLATELET 720  06:20 PM    PLATELET 397 (L)  02:12 AM    PLATELET 761 (L) 15/85/9286 07:45 PM    PLATELET 360 (L)  12:46 PM    PLATELET 277 (L)  07:03 AM    PLATELET 005 (L) 10/91/8843 06:30 AM    Hgb, External 11.9 10/14/2021 12:00 AM    Hgb, External 12.6 2021 12:00 AM Hgb, External 12.6 2016 12:00 AM    Hct, External 34.9 10/14/2021 12:00 AM    Hct, External 36.5 2021 12:00 AM    Hct, External 37.5 2016 12:00 AM    Platelet cnt., External 141 10/14/2021 12:00 AM    Platelet cnt., External 146 2021 12:00 AM    Platelet cnt., External 152 2016 12:00 AM     Lab Results   Component Value Date/Time    Rubella, External Immune 2021 12:00 AM    GrBStrep, External Negative 2022 12:00 AM    HBsAg, External negative 2021 12:00 AM    HIV, External Negative 2021 12:00 AM    RPR, External Non-Reactive 2021 12:00 AM    Gonorrhea, External Negative 2021 12:00 AM    Chlamydia, External Negative 2021 12:00 AM         Assessment:   PPD 1 s/p , Doing well and stable  Plan:  1.  Continue routine postpartum care      Agnieszka Miramontes DO  3/27/2022  9:26 AM

## 2022-03-28 VITALS
WEIGHT: 189 LBS | HEART RATE: 78 BPM | BODY MASS INDEX: 27.99 KG/M2 | DIASTOLIC BLOOD PRESSURE: 81 MMHG | SYSTOLIC BLOOD PRESSURE: 120 MMHG | OXYGEN SATURATION: 98 % | RESPIRATION RATE: 18 BRPM | HEIGHT: 69 IN | TEMPERATURE: 98.1 F

## 2022-03-28 PROCEDURE — 74011250637 HC RX REV CODE- 250/637: Performed by: STUDENT IN AN ORGANIZED HEALTH CARE EDUCATION/TRAINING PROGRAM

## 2022-03-28 PROCEDURE — 74011250637 HC RX REV CODE- 250/637: Performed by: OBSTETRICS & GYNECOLOGY

## 2022-03-28 RX ORDER — IBUPROFEN 800 MG/1
800 TABLET ORAL
Qty: 40 TABLET | Refills: 0 | Status: SHIPPED | OUTPATIENT
Start: 2022-03-28

## 2022-03-28 RX ORDER — BUTALBITAL, ACETAMINOPHEN AND CAFFEINE 50; 325; 40 MG/1; MG/1; MG/1
2 TABLET ORAL ONCE
Status: COMPLETED | OUTPATIENT
Start: 2022-03-28 | End: 2022-03-28

## 2022-03-28 RX ADMIN — IBUPROFEN 800 MG: 800 TABLET, FILM COATED ORAL at 11:31

## 2022-03-28 RX ADMIN — IBUPROFEN 800 MG: 800 TABLET, FILM COATED ORAL at 02:14

## 2022-03-28 RX ADMIN — ACETAMINOPHEN 650 MG: 325 TABLET ORAL at 08:43

## 2022-03-28 RX ADMIN — DOCUSATE SODIUM 100 MG: 100 CAPSULE, LIQUID FILLED ORAL at 08:43

## 2022-03-28 RX ADMIN — BUTALBITAL, ACETAMINOPHEN, AND CAFFEINE 2 TABLET: 50; 325; 40 TABLET ORAL at 10:12

## 2022-03-28 RX ADMIN — SERTRALINE 50 MG: 50 TABLET, FILM COATED ORAL at 08:43

## 2022-03-28 RX ADMIN — ACETAMINOPHEN 650 MG: 325 TABLET ORAL at 02:51

## 2022-03-28 NOTE — PROGRESS NOTES
Post-Partum Day Number 2 Progress Note    Erlin Peck       Information for the patient's :  [place] He [[id number]]   Vaginal, Spontaneous    Patient doing well without significant complaint. Voiding without difficulty, normal lochia. Tolerating diet without nausea or vomiting. Pain controlled with oral medications. C/o HA this morning, feels like migraine but does get better when lying down     Vitals:  Visit Vitals  /81 (BP 1 Location: Right upper arm, BP Patient Position: At rest)   Pulse 78   Temp 98.1 °F (36.7 °C)   Resp 18   Ht 5' 9\" (1.753 m)   Wt 85.7 kg (189 lb)   LMP 2021   SpO2 98%   Breastfeeding Yes   BMI 27.91 kg/m²     Temp (24hrs), Av °F (36.7 °C), Min:97.6 °F (36.4 °C), Max:98.2 °F (36.8 °C)        Exam:   Patient without distress.                 FF @ U-2 NT                LE NT w/o edema    Labs:     Lab Results   Component Value Date/Time    WBC 8.4 2022 03:11 PM    WBC 8.6 2022 06:20 PM    WBC 8.2 2021 02:12 AM    WBC 6.4 2021 07:45 PM    WBC 7.1 2021 12:46 PM    WBC 7.2 2016 07:03 AM    WBC 9.0 2010 06:30 AM    HGB 10.9 (L) 2022 03:11 PM    HGB 10.3 (L) 2022 06:20 PM    HGB 10.4 (L) 2021 02:12 AM    HGB 12.4 2021 07:45 PM    HGB 13.5 2021 12:46 PM    HGB 11.5 2016 07:03 AM    HGB 10.6 (L) 06/15/2010 11:10 AM    HGB 10.9 (L) 2010 06:30 AM    HCT 33.8 (L) 2022 03:11 PM    HCT 32.4 (L) 2022 06:20 PM    HCT 31.4 (L) 2021 02:12 AM    HCT 37.5 2021 07:45 PM    HCT 41.1 2021 12:46 PM    HCT 36.1 2016 07:03 AM    HCT 33.0 (L) 06/15/2010 11:10 AM    HCT 32.9 (L) 2010 06:30 AM    PLATELET 220 (L)  03:11 PM    PLATELET 230  06:20 PM    PLATELET 450 (L)  02:12 AM    PLATELET 396 (L)  07:45 PM    PLATELET 672 (L) 5470 12:46 PM    PLATELET 919 (L) 2535 07:03 AM    PLATELET 358 (L) 2010 06:30 AM    Hgb, External 11.9 10/14/2021 12:00 AM    Hgb, External 12.6 2021 12:00 AM    Hgb, External 12.6 2016 12:00 AM    Hct, External 34.9 10/14/2021 12:00 AM    Hct, External 36.5 2021 12:00 AM    Hct, External 37.5 2016 12:00 AM    Platelet cnt., External 141 10/14/2021 12:00 AM    Platelet cnt., External 146 2021 12:00 AM    Platelet cnt., External 152 2016 12:00 AM     Lab Results   Component Value Date/Time    Rubella, External Immune 2021 12:00 AM    GrBStrep, External Negative 2022 12:00 AM    HBsAg, External negative 2021 12:00 AM    HIV, External Negative 2021 12:00 AM    RPR, External Non-Reactive 2021 12:00 AM    Gonorrhea, External Negative 2021 12:00 AM    Chlamydia, External Negative 2021 12:00 AM         Assessment:   PPD 2  s/p , Doing well and stable  Plan:  1. Continue routine postpartum care  2. Discharge home today.    3. Medical complications: anxiety    Jeff Greenwood DO  3/28/2022  8:26 AM

## 2022-03-28 NOTE — ROUTINE PROCESS
Verbal shift change report given to ANGEL Pereira RN (oncoming nurse) by SHAWN Clark RN (offgoing nurse). Report included the following information SBAR, Kardex, Intake/Output, MAR and Accordion.

## 2022-03-28 NOTE — ROUTINE PROCESS
Verbal shift change report given to LOPEZ Clark RN (oncoming nurse) by John Whitaker RN (offgoing nurse). Report included the following information SBAR, Kardex, Intake/Output, MAR and Accordion.

## 2022-03-28 NOTE — ADT AUTH CERT NOTES
UR CONTACT IS ARASELI HAMILOTN   UR RETURN FAX [phone]  UR PHONE [phone]    PLEASE FAX BACK REF#, STATUS AND CLINICAL NEEDS     PATIENT ADMISSION DATE: 2022  STILL IN HOUSE   DELIVERED VAGINALLY ON 2022    THANK YOU! [address]     FACILITY NPI : [id number]  FACILITY TAX ID : [id number]     [address] [address] [phone]            Patient Name :Naif Rudd   : 1990 (32 yrs)  MRN : [id number]     Patient Mailing Address [address]  [68] , [address]                                                             .         :      Secondary Coverage:  [facility]     Secondary Subscriber ID :  [id number]      Current Patient Class : INPATIENT  Admit Date : 3/24/2022     REQUESTED LEVEL OF CARE: INPATIENT [101]                                                           Diagnosis : 39 weeks gestation of pregnancy; Encounter for elective induction of labor;Gestational thrombocytopenia ([facility] [address] Palpitations                          ICD10 Code : 39 weeks gestation of pregnancy [Z3A.39]  Encounter for elective induction of labor [Z34.90]  Gestational thrombocytopenia ([facility] [address] [O99.119, D69.6]  Palpitations [R00.2]     Current Room and Bed 314/01     Admitting and Attending Info:  Admitting Provider : June Drew MD     NPI: [id number]  Admitting Provider Phone.  [phone]  Admitting Provider Address:   [address] [place] [address]          Patient Demographics    Patient Name   Kapil Moment Legal Sex   Female    1990 Address   [facility] [address] [facility] [address] Phone   [phone] (Home) *Preferred*   [phone] [facility] Account    Name Acct ID Class Status Primary Coverage   Rossi Fortune Kerri [facility] [id number] INPATIENT Open [place]            Guarantor Account (for Hospital Account [de-identified])    Name Relation to Pt Service Area Active? Acct Type   Kailee Romero Self [facility] Yes Personal/Family   Address Phone     [address]   [place], [address] [place] [address] [phone](V)              Coverage Information (for Hospital Account [de-identified])      1. [employer] [employer]    F/O Payor/Plan Subscriber  Subscriber Sex Precert #   [employer] 89 F    Subscriber Subscriber #   Kailee Romero [id number]   Grp # Group Name   [id number]   Address Phone   [address]    Policy Number Status Effective Date Benefits Phone   [id number] -  -   Auth/Cert        2. [address] OF [place]    F/O Payor/Plan Subscriber  Subscriber Sex Precert #   [place]  [address] OF [place]  [address] OF [place] 90 F    Subscriber Subscriber #   Kailee Romero [id number]   Grp # Group Name    [facility] [employer]   Address Phone   [address] [address] [facility], [address] [place] [address]    Policy Number Status Effective Date Benefits Phone   [id number] -  -   Auth/Cert                 Admission Information    Arrival Date/Time: 2022 1733 Admit Date/Time: 2022 1733 IP Adm.  Date/Time: 2022 1733   Admission Type: Urgent Point of Origin: Non-health Care Facility/self Admit Category:    Means of Arrival:  Primary Service: Obstetrics Secondary Service: N/A   Transfer Source:  Service Area: [facility] Parents Unit: [facility] 3 MOTHER INFANT   Admit Provider: Rajani Donohue MD Attending Provider: Rajani Donohue MD Referring Provider:      Admission Information    Attending Provider Admission Dx Admitted on   Rajani Donohue MD 39 weeks gestation of pregnancy, Encounter for elective induction of labor, Gestational thrombocytopenia ([address] Palpitations 22   Service Isolation Code Status   OBSTETRICS -- Full Code   Allergies Advance Care Planning    Onion, Vancomycin Jump to the Activity       Admission Information    Unit/Bed: [facility] 3 MOTHER INFANT Service: OBSTETRICS   Admitting provider: Isabel Fung MD Phone: [phone]   Attending provider: Isabel Fung MD Phone: [phone]   PCP: Dede Parmar NP Phone: [phone]   Admission dx: pregnancy Patient class: I   Admission type: UR       Patient Demographics    Patient Name   Selene Tapia   [id number] Legal Sex   Female    1990 Address   One [address] Phone   [phone] (Home) *Preferred*   [phone] (Mobile)     H&P Notes       H&P by Isabel Fung MD at 22 documented on Admission (Current) from 3/24/2022 in [facility] 23.: Isabel Fung MD Author Type: Physician Filed: 22   Note Status: Signed Cosign: Cosign Not Required Date of Service: 22   : Isabel Fung MD (Physician)                  History & Physical     Name: Nevaeh Perez MRN: [id number]  SSN: [id number]    YOB: 1990  Age: 28 y.o. Sex: female          Subjective:      Estimated Date of Delivery: 3/28/22           OB History         4    Para   3    Term   3            AB        Living   3        SAB        IAB        Ectopic        Molar        Multiple   0    Live Births   3                   Ms. Nathan Thompson is admitted with pregnancy at 39w3d for elective induction of labor. Feeling well. GFM, no ctx/bleeding/lof.           Past Medical History:   Diagnosis Date    Anxiety      Asthma       last needed inhaler two months ago.     Gestational thrombocytopenia ([facility] [address] 3/24/2022    Headache      Herpes simplex virus (HSV) infection      Psychiatric problem       Zoloft 50mg daily            Past Surgical History:   Procedure Laterality Date    HX OTHER SURGICAL         Tonsillectomy/adnoidectomy    HX TONSILLECTOMY          Social History          Occupational History    Not on file   Tobacco Use    Smoking status: Former Smoker       Packs/day: 0.25    Smokeless tobacco: Never Used   Vaping Use    Vaping Use: Never used   Substance and Sexual Activity    Alcohol use: Not Currently    Drug use: No    Sexual activity: Yes       Partners: Male            Family History   Problem Relation Age of Onset    Other Other           Chronic Obstructive pulmonary diseas     Stroke Other      Heart Disease Other      Hypertension Other      Anemia Other      Anxiety Other      Diabetes Other      Heart Disease Mother                 Allergies   Allergen Reactions    Onion Itching       Red Onions, Makes throat tingly    Vancomycin Itching       Itching and facial redness                 Prior to Admission medications    Medication Sig Start Date End Date Taking? Authorizing Provider   valACYclovir (Valtrex) 500 mg tablet Take 500 mg by mouth daily. Indications: suppression of recurrent Herpes simplex infection in HIV     Yes Provider, Historical   Classic Prenatal 28 mg iron- 800 mcg tab   12/15/21   Yes Provider, Historical   famotidine (PEPCID) 20 mg tablet Take 20 mg by mouth two (2) times a day. 1/21/22   Yes Provider, Historical   sertraline (ZOLOFT) 50 mg tablet Take 1 Tablet by mouth daily. 2/2/22   Yes Prisca Duron NP   albuterol (PROVENTIL HFA, VENTOLIN HFA, PROAIR HFA) 90 mcg/actuation inhaler USE 2 PUFFS BY MOUTH EVERY 4 HOURS AS NEEDED. 1/7/22   Yes Iris Denson NP   ferrous sulfate 325 mg (65 mg iron) tablet   12/15/21     Provider, Historical   pyridoxine, vitamin B6, (Vitamin B-6) 100 mg tablet Take 100 mg by mouth daily. Patient not taking: Reported on 3/24/2022       Provider, Historical   hydrOXYzine pamoate (VISTARIL) 25 mg capsule Take 1 Cap by mouth three (3) times daily as needed for Anxiety.   Patient not taking: Reported on 2/18/2022 3/1/21     Prisca Duron NP         Review of Systems: A comprehensive review of systems was negative except for that written in the HPI.     Objective:      Vitals:        Vitals:     03/24/22 1754 03/24/22 1801 03/24/22 1933   BP: 125/80   119/73   Pulse: 91   90   Resp:     14   Temp:     98.2 °F (36.8 °C)   SpO2:     100%   Weight:   85.7 kg (189 lb)     Height:   5' 9\" (1.753 m)           Physical Exam:  Patient without distress. Abdomen: soft, nontender  Fundus: firm and non tender  Cervical Exam: 0 cm dilated    70% effaced    -3 station    Lower Extremities:  - Edema No  EFW 8#  Membranes:  Intact  Fetal Heart Rate: Reactive     Cervix palpates with a central dimple likely 2/2 scar tissue from LEEP after last delivery      Prenatal Labs:         Lab Results   Component Value Date/Time     Rubella, External Immune 08/30/2021 12:00 AM     GrBStrep, External Negative 02/28/2022 12:00 AM     HBsAg, External negative 08/30/2021 12:00 AM     HIV, External Negative 08/30/2021 12:00 AM     RPR, External Non-Reactive 08/30/2021 12:00 AM     Gonorrhea, External Negative 08/02/2021 12:00 AM     Chlamydia, External Negative 08/02/2021 12:00 AM         Assessment/Plan:      Plan: Admit for elective IOL   - Rh pos / GBS neg   - Hx HSV, no active lesions, on valtrex  - Hx palpitations, s/p EKG and Echo w/ cards, cleared for vaginal delivery  - Other babies w/ arrythmia, fetal echo wnl, baby will be examined by peds after delivery via cardiac auscultation   - gestational thrombocytopenia: f/u admission labs, last PLT stable 140   - asthma no hemabate, infrequent inhaler use   - Diet reg  - Fetal well being - cefm, reactive  - IOL plan: Cytotec overnight, Plan for low dose pitocin if swati too often for cytotec up to 8.  Will re-examine in the morning for ability to place paris/cook    - Anesthesia pcea prn        Signed By:  Maximilian Hou MD      March 24, 2022                  H&P by Andrei Noguera MD at 02/20/22 2104 documented on ED to Hosp-Admission (Discharged) from 2/20/2022 in [facility] [place] [address] LABOR & DELIVERY    Author: Kamran Engle MD Author Type: Physician Filed: 22   Note Status: Signed Cosign: Cosign Not Required Date of Service: 22   : Kamran Engle MD (Physician)                           History and Physical     Patient: Nikki Perez MRN: [id number]  SSN: [id number]    YOB: 1990  Age: 28 y.o. Sex: female       Subjective:      Nikki Perez is a 28 y.o. female [id number] at 29 6/7 weeks comes in for decreased FM. She states she has felt movement today, but much less than usual.  She also has had a few non painful wesley renner, nothing regular. No VB, no LOF. Denies complications with pregnancy     POB:   X3  G4- current, no complications to date     Pgyn:  Denies STI, chart states + HSV             Past Medical History:   Diagnosis Date    Anxiety      Asthma       last needed inhaler two months ago.  Headache      Herpes simplex virus (HSV) infection      Psychiatric problem       Zoloft 50mg daily            Past Surgical History:   Procedure Laterality Date    HX OTHER SURGICAL         Tonsillectomy/adnoidectomy    HX TONSILLECTOMY                Family History   Problem Relation Age of Onset    Other Other           Chronic Obstructive pulmonary diseas     Stroke Other      Heart Disease Other      Hypertension Other      Anemia Other      Anxiety Other      Diabetes Other      Heart Disease Mother        Social History            Tobacco Use    Smoking status: Former Smoker       Packs/day: 0.25    Smokeless tobacco: Never Used   Substance Use Topics    Alcohol use: Not Currently              Prior to Admission medications    Medication Sig Start Date End Date Taking? Authorizing Provider   Classic Prenatal 28 mg iron- 800 mcg tab   12/15/21   Yes Provider, Historical   famotidine (PEPCID) 20 mg tablet Take 20 mg by mouth two (2) times a day.  22   Yes Provider, Historical   sertraline (ZOLOFT) 50 mg tablet Take 1 Tablet by mouth daily. 2/2/22   Yes Junior Osei NP   albuterol (PROVENTIL HFA, VENTOLIN HFA, PROAIR HFA) 90 mcg/actuation inhaler USE 2 PUFFS BY MOUTH EVERY 4 HOURS AS NEEDED. 1/7/22   Yes Pleasant Flurry, NP   pyridoxine, vitamin B6, (Vitamin B-6) 100 mg tablet Take 100 mg by mouth daily.     Yes Provider, Historical   ferrous sulfate 325 mg (65 mg iron) tablet   12/15/21     Provider, Historical   hydrOXYzine pamoate (VISTARIL) 25 mg capsule Take 1 Cap by mouth three (3) times daily as needed for Anxiety.   Patient not taking: Reported on 2/18/2022 3/1/21     Junior Osei NP               Allergies   Allergen Reactions    Onion Itching       Red Onions, Makes throat tingly    Vancomycin Itching       Itching and facial redness            Review of Systems:  A comprehensive review of systems was negative except for that written in the History of Present Illness.     Objective:           Vitals:     02/20/22 2019 02/20/22 2020   BP: 125/81     Pulse: 85     Resp: 18     Temp: 98.1 °F (36.7 °C)     SpO2: 100%     Weight:   81.2 kg (179 lb)   Height:   5' 9\" (1.753 m)         Physical Exam:  GENERAL: alert, cooperative, no distress, appears stated age  LUNG: clear to auscultation bilaterally  HEART: regular rate and rhythm, S1, S2 normal, no murmur, click, rub or gallop  ABDOMEN: gravid, c/w GA, NT< ND  SKIN: Normal.        FHT: 150 with + accesl, no decels, moderate variablility, Cat I, reactive  [place]: rare  SVE: cl/50/high  vtx     Assessment:      A/ decreased FM     Plan:      P/ monitor, kick counts and re evaluate.     Signed By: Conchis Allen MD      February 20, 2022                     H&P by Judson Steinberg MD at 02/18/22 2158 documented on ED to Hosp-Admission (Discharged) from 2/18/2022 in [facility] [place] [address] LABOR & DELIVERY    Author: Judson Steinberg MD Author Type: Physician Filed: 02/18/22 2201   Note Status: Signed Cosign: Cosign Not Required Date of Service: 02/18/22 2158   : Judson Steinberg MD (Physician)               Expand AllCollapse All    History & Physical     Name: Jim Campa MRN: [id number]  SSN: [id number]    YOB: 1990  Age: 28 y.o. Sex: female       Subjective:      Reason for Admission:  Pregnancy and Frontal Headache     History of Present Illness: Jim Campa is a 28 y.o.  female with an estimated gestational age of 31w1d with Estimated Date of Delivery: 3/28/22. Patient complains of moderate headache  for 1 days. Pregnancy has been complicated by Gestational Thrombocytopenia and Palpitations. Patient denies abdominal pain  , chest pain, contractions, fever, pelvic pressure, right upper quadrant pain  , shortness of breath, swelling, vaginal bleeding , vaginal leaking of fluid  and visual disturbances.              OB History         4    Para   3    Term   3            AB        Living   3        SAB        IAB        Ectopic        Molar        Multiple   0    Live Births   3                     Past Medical History:   Diagnosis Date    Anxiety      Asthma       last needed inhaler two months ago.     Headache      Herpes simplex virus (HSV) infection      Psychiatric problem       Zoloft 50mg daily            Past Surgical History:   Procedure Laterality Date    HX OTHER SURGICAL         Tonsillectomy/adnoidectomy    HX TONSILLECTOMY          Social History            Occupational History    Not on file   Tobacco Use    Smoking status: Former Smoker       Packs/day: 0.25    Smokeless tobacco: Never Used   Vaping Use    Vaping Use: Never used   Substance and Sexual Activity    Alcohol use: Not Currently    Drug use: No    Sexual activity: Yes       Partners: Male            Family History   Problem Relation Age of Onset    Other Other           Chronic Obstructive pulmonary diseas     Stroke Other      Heart Disease Other      Hypertension Other      Anemia Other      Anxiety Other      Diabetes Other    Heart Disease Mother                 Allergies   Allergen Reactions    Onion Itching       Red Onions, Makes throat tingly    Vancomycin Itching       Itching and facial redness                 Prior to Admission medications    Medication Sig Start Date End Date Taking? Authorizing Provider   ferrous sulfate 325 mg (65 mg iron) tablet   12/15/21   Yes Provider, Historical   Classic Prenatal 28 mg iron- 800 mcg tab   12/15/21   Yes Provider, Historical   famotidine (PEPCID) 20 mg tablet Take 20 mg by mouth two (2) times a day. 22   Yes Provider, Historical   sertraline (ZOLOFT) 50 mg tablet Take 1 Tablet by mouth daily. 22   Yes Brandon Alvarenga, NP   albuterol (PROVENTIL HFA, VENTOLIN HFA, PROAIR HFA) 90 mcg/actuation inhaler USE 2 PUFFS BY MOUTH EVERY 4 HOURS AS NEEDED. 22     Wale Zhang, NP   pyridoxine, vitamin B6, (Vitamin B-6) 100 mg tablet Take 100 mg by mouth daily. Patient not taking: Reported on 2022       Provider, Historical   hydrOXYzine pamoate (VISTARIL) 25 mg capsule Take 1 Cap by mouth three (3) times daily as needed for Anxiety.   Patient not taking: Reported on 2022 3/1/21     Tuba Citykaushik Alvarenga, NP         Review of Systems     Objective:      Vitals:         Vitals:     22 2111 22   BP:   116/69   Pulse:   85   Resp:   16   Temp:   97.8 °F (36.6 °C)   Weight: 81.2 kg (179 lb)     Height: 5' 9\" (1.753 m)        Temp (24hrs), Av.8 °F (36.6 °C), Min:97.8 °F (36.6 °C), Max:97.8 °F (36.6 °C)     BP  Min: 116/69  Max: 116/69      Physical Exam     Cervical Exam: Deferred  Uterine Activity: None  Membranes: Intact  Fetal Heart Rate: Baseline: 120 per minute  Variability: moderate  Accelerations: yes  Decelerations: none  Uterine contractions: none        Labs:   Recent Results   No results found for this or any previous visit (from the past 24 hour(s)).             Patient Active Problem List   Diagnosis Code    Pregnancy Z34.90    Generalized anxiety disorder F41. 1      Assessment and Plan:      - Headache:  Not consistent with signs of Preeclampsia. - will treat with tylenol and phenergan  - close observation        Signed By:  Zachary Allred MD      2022                      istor              Patient Demographics    Patient Name   Kenton Betts   [id number] Legal Sex   Female    1990 Address   One [facility] [address] [facility] [address] Phone   [phone] (Home) *Preferred*   [phone] [facility]   CSN:   [id number]     [address] [place] [address] Date: Admit Time Room Bed   Mar 24, 2022  5:33  [[id number]] 01 [[id number]]       Attending Providers    Provider Pager From To   Michelle Mariee MD  22      Emergency Contact(s)    Name Relation Home Work Mobile   Rosi Parent [phone]             MOM CHART     Radha Knott     MRN: [id number]       Jeimy Song to Baby  Comment        [de-identified] name MRN Account  Age Sex Admission Type   Myrl Brimson Female Mateo Carbon [id number] 3/26/22 2 days F Confirmed Admission - L&D      OB History       4    Para   4    Term   4            AB        Living   4      SAB        IAB        Ectopic        Molar        Multiple   0    Live Births   4         # Outcome Date GA Labor/2nd Weight Sex Delivery Anes PTL Lv A1 A5   1 Term      Vag-Spont           2 Term      Vag-Spont   Living        3 Term 16 39w1d 7h 13m / 0h 15m 3.535 kg M Vag-Spont Epidural  N Living 9 9   Name: Laura Barragan   Location: Other   Delivering Clinician: Neal Tate MD      4 Term 22 39w5d / 0h 18m 3.885 kg F Vag-Spont Epidural N Living 9 9   Name: Kenya Rizo   Location: Other   Delivering Clinician: Errol Magallanes,         Prenatal History     Most Recent Value   Did You Receive Prenatal Care Yes   Name Of OB Provider Pedro   Seen By MFM (Maternal Fetal Medicine)? No   Fetal Ultrasound Abnormalities/Concerns?  No   Infant Feeding Breast Milk Circumcision Planned No   Pediatrician After Birth/ Follow Up Baby Visits [facility]     Dating Summary    Working IVETTE: 22 set by Louis Will RN on 22 based on Last Menstrual Period on 21     Based On IVETTE GA Diff GA User Date   Last Menstrual Period on 21 Working  Louis Will RN 22   Alternate IVETTE Entry  0 Comment: Date entered prior to episode creation 22 Same  Louis Will RN 22     Prenatal Results      Prenatal Labs    Test Value Date Time   ABO/Rh * O positive  16    Antibody Scrn * Negative  21    Hgb * 11.9  10/14/21      * 12.6  21    Hct * 34.9  10/14/21      * 36.5  21    Platelets * 171        * 146  21    Rubella * Immune  21    RPR * Non-Reactive  21    T.  Pallidum Antibody      Urine      Hep B Surf Ag * negative  21    Hep C      HIV * Negative  21    Gonorrhea * Negative  21    Chlamydia * Negative  21    TSH      GTT, 1 HR (Glucola)      GTT, Fasting      GTT, 1 HR      GTT, 2 HR      GTT, 3 HR        3rd Trimester    Test Value Date Time   Hgb      Hct      Platelets      Group B Strep * Negative  22    Antibody Scrn      TSH      T. Pallidum Antibody      Hep B Surf Ag      Gonorrhea      Chlamydia         Screening/Diagnostics    Test Value Date Time   AFP Only      AFP Tetra      Hgb Electrophoresis      Amniocentesis      Cystic Fibrosis      Thalessemia      Rudolph-Sachs      Canavan      PAP Smear      Beta HCG      NT      NIPT      COVID-19        Lab    Test Value Date Time   GTT, Fasting      GTT, 1HR      GTT, 2HR      GTT, 3HR      RPR * Non-Reactive  21    Beta HCG      CMV Ab      Toxoplasma Ab      Varicella Zoster Ab           Legend    *: Historical  View all results for this pregnancy       Mp Casas [[id number]]      [place] Delivery    Birth date/time: 3/26/2022 12:15:00  Delivery type: Vaginal, Spontaneous  Complications: None    Labor Event Times    Dilation complete date/time: 3/26/2022 1157  Start pushing date/time: 3/26/2022 1203    Labor Events     labor?: No   steroids?: None  Antibiotics during labor?: No  Sac identifier: Sac 1  Rupture date/time: 3/26/2022 0100  Rupture type: SROM  Fluid color: Clear  Fluid odor: None  Cervical ripening: Misoprostol  Induction: Oxytocin, Prostaglandins  Induction date/time: 3/25/2022  Induction indications: Elective  Augmentation: None  Complications: None    Delivery Providers    Delivering clinician: Ton Olivares DO  Provider Role   Ton Olivares,  Obstetrician   Yovani Webster, RN Primary Nurse   Mirna Rojas, RN Primary Nurse   Laya Andrade, RN Nursery Nurse     Anesthesia    Method: Epidural    Multiple Birth Onset Second Stage    Second Stage Start Date: 3/26/22 Second Stage Start Time:      Operative Delivery    Forceps attempted?: No  Vacuum extractor attempted?: No    Presentation    Presentation: Vertex  Position: Occiput    Apgars    Living status: Living  Apgars:  1 min. :  5 min.:  10 min. :  15 min.:  20 min.:    Skin color:  1  1       Heart rate:  2  2       Reflex irritability:  2  2       Muscle tone:  2  2       Respiratory effort:  2  2       Total:  9  9       Apgars assigned by: Lela Blackwood RN    Resuscitation    Method: Suctioning-bulb, Tactile Stimulation    Shoulder Dystocia    Shoulder dystocia present?: No    Measurements    Weight: 3885 g  Weight (lbs): 8 lb 9 oz  Length: 52.7 cm  Length (in): 20.75\"  Head circumference: 36 cm  Chest circumference: 35 cm  Abdominal girth: 33 cm    Lacerations    Episiotomy: None    Lacerations: 2nd  Repair Needed: Vicryl 2-0  # of Repair Packets:   Suture Type and Size:   Suture Comment:   Estimated Blood Loss (mL): 1      Placenta    Placenta Date/Time: 3/26/2022 1220  Removal: Expressed    Vaginal Counts    Initial count personnel: Senait Ricci MD  Final count personnel: Agustin Forrest RN  Accurate final count?: Yes    Skin to Skin    Skin to skin initiated date/time: 3/26/2022 1215  Skin to skin with:  Mother        Delivery Summary History    Event User Date/Time   Signed Pablo Liriano RN 3/26/2022 14:19:14   Opened for Addendum Corazon Cruz RN 3/26/2022 15:00:25   Signed Ирина Cardenas DO 3/27/2022 03:23:85

## 2022-03-28 NOTE — PROGRESS NOTES
RN walks into room to find patient holding baby and hanging her head low. Pt states she has a headache and neck ache and asks \"do you think it's stress related? \" RN agrees that she thinks it could be caused by stress. Pt's children in room.  not in room at this time. RN questions what she has been drinking today and if she took a nap. Pt states \"I haven't had any water today, a few sweet teas, and a pepsi\" and no naps. RN states that the patient needs to drink more water especially with breastfeeding and that she needs to rest and relax. RN administered Tylenol, set up a KPAD, and brought a pitcher of water. RN asks if the baby can come to the nursery for awhile so she can relax. Pt agrees and baby is brought to the nursery at this time. 2045- Pt calls out requesting baby brought to nursery. Staff RN brings back to nursery and states mom denies needs. 2110- RN rounds on patient. Baby held by sister. Pt laying back in bed on heating pad talking on the phone. Pt appears more relaxed and denies needs at this time. 2245- RN rounds on patient. Pt holding baby with head hanging low. Pt's children and  in the room. Pt states she just  baby and requests baby to go to nursery. RN brings baby to the nursery and educates the patient on the importance of po fluids, rest and relaxation. Pt acknowledges. 2305- RN gives pt Tylenol and again instructed her to please get some rest.    0005- Staff RN rounds on patient. Children at bedside.  Pt sleeping comfortably in bed

## 2022-03-28 NOTE — PROGRESS NOTES
Patient discharged to home with infant and significant other. Infant in carseat. Patient in wheelchair. Prescriptions e sent to patient's pharmacy by OB. Discharge instructions reviewed with patient and significant other, signed by patient, and copies given. Per patient and significant other, no questions. Patient and infant bands verified. See infant note and/or footprint sheet on chart.

## 2022-03-28 NOTE — LACTATION NOTE
This note was copied from a baby's chart. Experienced breastfeeding mother. This is her third baby to breastfeed. Mother getting ready for discharge when [facility] came to visit. Baby did latch on and breast fed well during visit. Discussed with mother her plan for feeding. Reviewed the benefits of exclusive breast milk feeding during the hospital stay. Informed her of the risks of using formula to supplement in the first few days of life as well as the benefits of successful breast milk feeding; referred her to the Breastfeeding booklet about this information. She acknowledges understanding of information reviewed and states that it is her plan to breastfeed her infant. Will support her choice and offer additional information as needed. Reviewed breastfeeding basics:  Supply and demand, breastfeed baby 8-12 times in 24 hr., feed baby on demand,  stomach size, early  Feeding cues, skin to skin, positioning and baby led latch-on, assymetrical latch with signs of good, deep latch vs shallow, feeding frequency and duration, and log sheet for tracking infant feedings and output. Breastfeeding Booklet and Warm line information given. Discussed typical  weight loss and the importance of infant weight checks with pediatrician 1-2 post discharge. Discussed eating a healthy diet. Instructed mother to eat a variety of foods in order to get a well balanced diet. She should consume an extra 500 calories per day (more than her non-pregnant requirement.) These extra calories will help provide energy needed for optimal breast milk production. Mother also encouraged to \"drink to thirst\" and it is recommended that she drink fluids such as water, fruit/vegetable juice. Nutritious snacks should be available so that she can eat throughout the day to help satisfy her hunger and maintain a good milk supply.     Discussed what to do if she gets engorged or nipples become sore:  Engorgement Care Guidelines: Reviewed how milk is made and normal phases of milk production. Taught care of engorged breasts - frequent breastfeeding encouraged, cool packs and motrin as tolerated. Anticipatory guidance shared. Care for sore/tender nipples discussed:  ways to improve positioning and latch practiced and discussed, hand express colostrum after feedings and let air dry, light application of lanolin, hydrogel pads, seek comfortable laid back feeding position, start feedings on least sore side first.    Reviewed pumping/storage and preparation of expressed breast milk for baby. Reviewed symptoms of mastitis and to call OB doctor if she notices them. Mother will successfully establish breastfeeding by feeding in response to early feeding cues   or wake every 3h, will obtain deep latch, and will keep log of feedings/output. Taught to BF at hunger cues and or q 2-3 hrs and to offer 10-20 drops of hand expressed colostrum at any non-feeds. Breast Assessment  Left Breast: Large  Left Nipple: Everted,Intact,Tender  Right Breast: Large  Right Nipple: Everted,Intact,Tender  Breast- Feeding Assessment  Attends Breast-Feeding Classes: No  Breast-Feeding Experience: Yes (This is mother's 3rd baby to breastfeed.  She breast fed up to 3 years.)  Breast Trauma/Surgery: No  Type/Quality: Good  Lactation Consultant Visits  Breast-Feedings: Good  (Baby latched on well to right breast and nursed vigorously  in cradle hold.)  Mother/Infant Observation  Mother Observation: Alignment,Holds breast,Close hold,Recognizes feeding cues  Infant Observation: Audible swallows,Lips flanged, upper,Opens mouth,Feeding cues,Frenulum checked,Rhythmic suck,Latches nipple and aereolae,Lips flanged, lower  LATCH Documentation  Latch: Grasps breast, tongue down, lips flanged, rhythmic sucking  Audible Swallowing: A few with stimulation  Type of Nipple: Everted (after stimulation)  Comfort (Breast/Nipple): Filling, red/small blisters/bruises, mild/mod discomfort  Hold (Positioning): No assist from staff, mother able to position/hold infant  LATCH Score: 8    Chart shows numerous feedings, void, stool WNL. Discussed importance of monitoring outputs and feedings on first week of life. Discussed ways to tell if baby is  getting enough breast milk, ie  voids and stools, change in color of stool, and return to birth wt within 2 weeks. Follow up with pediatrician visit for weight check in 1-2 days (per AAP guidelines.)  Encouraged to call Warm Line  [phone]  for any questions/problems that arise.  Mother also given breastfeeding support group dates and times for any future needs

## 2022-03-29 ENCOUNTER — HOSPITAL ENCOUNTER (EMERGENCY)
Age: 32
Discharge: HOME | End: 2022-03-29
Attending: EMERGENCY MEDICINE
Payer: COMMERCIAL

## 2022-03-29 ENCOUNTER — APPOINTMENT (OUTPATIENT)
Dept: CT IMAGING | Age: 32
End: 2022-03-29
Attending: EMERGENCY MEDICINE
Payer: COMMERCIAL

## 2022-03-29 VITALS
OXYGEN SATURATION: 96 % | RESPIRATION RATE: 16 BRPM | SYSTOLIC BLOOD PRESSURE: 114 MMHG | HEIGHT: 69 IN | TEMPERATURE: 98.2 F | WEIGHT: 175.71 LBS | HEART RATE: 66 BPM | BODY MASS INDEX: 26.02 KG/M2 | DIASTOLIC BLOOD PRESSURE: 54 MMHG

## 2022-03-29 DIAGNOSIS — G97.1 POST-DURAL PUNCTURE HEADACHE: Primary | ICD-10-CM

## 2022-03-29 LAB
ALBUMIN SERPL-MCNC: 2.7 G/DL (ref 3.5–5)
ALBUMIN/GLOB SERPL: 0.8 {RATIO} (ref 1.1–2.2)
ALP SERPL-CCNC: 116 U/L (ref 45–117)
ALT SERPL-CCNC: 59 U/L (ref 12–78)
ANION GAP SERPL CALC-SCNC: 5 MMOL/L (ref 5–15)
APPEARANCE UR: CLEAR
AST SERPL-CCNC: 63 U/L (ref 15–37)
BACTERIA URNS QL MICRO: NEGATIVE /HPF
BASOPHILS # BLD: 0 K/UL (ref 0–0.1)
BASOPHILS NFR BLD: 0 % (ref 0–1)
BILIRUB SERPL-MCNC: 0.2 MG/DL (ref 0.2–1)
BILIRUB UR QL: NEGATIVE
BUN SERPL-MCNC: 9 MG/DL (ref 6–20)
BUN/CREAT SERPL: 14 (ref 12–20)
CALCIUM SERPL-MCNC: 8.8 MG/DL (ref 8.5–10.1)
CHLORIDE SERPL-SCNC: 112 MMOL/L (ref 97–108)
CO2 SERPL-SCNC: 26 MMOL/L (ref 21–32)
COLOR UR: ABNORMAL
CREAT SERPL-MCNC: 0.66 MG/DL (ref 0.55–1.02)
DIFFERENTIAL METHOD BLD: ABNORMAL
EOSINOPHIL # BLD: 0.2 K/UL (ref 0–0.4)
EOSINOPHIL NFR BLD: 4 % (ref 0–7)
EPITH CASTS URNS QL MICRO: ABNORMAL /LPF
ERYTHROCYTE [DISTWIDTH] IN BLOOD BY AUTOMATED COUNT: 14.1 % (ref 11.5–14.5)
GLOBULIN SER CALC-MCNC: 3.6 G/DL (ref 2–4)
GLUCOSE SERPL-MCNC: 83 MG/DL (ref 65–100)
GLUCOSE UR STRIP.AUTO-MCNC: NEGATIVE MG/DL
HCT VFR BLD AUTO: 32.8 % (ref 35–47)
HGB BLD-MCNC: 10.6 G/DL (ref 11.5–16)
HGB UR QL STRIP: ABNORMAL
HYALINE CASTS URNS QL MICRO: ABNORMAL /LPF (ref 0–2)
IMM GRANULOCYTES # BLD AUTO: 0.1 K/UL (ref 0–0.04)
IMM GRANULOCYTES NFR BLD AUTO: 1 % (ref 0–0.5)
KETONES UR QL STRIP.AUTO: NEGATIVE MG/DL
LEUKOCYTE ESTERASE UR QL STRIP.AUTO: ABNORMAL
LYMPHOCYTES # BLD: 1 K/UL (ref 0.8–3.5)
LYMPHOCYTES NFR BLD: 17 % (ref 12–49)
MCH RBC QN AUTO: 32.1 PG (ref 26–34)
MCHC RBC AUTO-ENTMCNC: 32.3 G/DL (ref 30–36.5)
MCV RBC AUTO: 99.4 FL (ref 80–99)
MONOCYTES # BLD: 0.4 K/UL (ref 0–1)
MONOCYTES NFR BLD: 7 % (ref 5–13)
NEUTS SEG # BLD: 4.2 K/UL (ref 1.8–8)
NEUTS SEG NFR BLD: 71 % (ref 32–75)
NITRITE UR QL STRIP.AUTO: NEGATIVE
NRBC # BLD: 0 K/UL (ref 0–0.01)
NRBC BLD-RTO: 0 PER 100 WBC
PH UR STRIP: 6.5 [PH] (ref 5–8)
PLATELET # BLD AUTO: 167 K/UL (ref 150–400)
PMV BLD AUTO: 9.5 FL (ref 8.9–12.9)
POTASSIUM SERPL-SCNC: 3.5 MMOL/L (ref 3.5–5.1)
PROT SERPL-MCNC: 6.3 G/DL (ref 6.4–8.2)
PROT UR STRIP-MCNC: NEGATIVE MG/DL
RBC # BLD AUTO: 3.3 M/UL (ref 3.8–5.2)
RBC #/AREA URNS HPF: ABNORMAL /HPF (ref 0–5)
SODIUM SERPL-SCNC: 143 MMOL/L (ref 136–145)
SP GR UR REFRACTOMETRY: 1.01 (ref 1–1.03)
UR CULT HOLD, URHOLD: NORMAL
UROBILINOGEN UR QL STRIP.AUTO: 1 EU/DL (ref 0.2–1)
WBC # BLD AUTO: 6 K/UL (ref 3.6–11)
WBC URNS QL MICRO: ABNORMAL /HPF (ref 0–4)

## 2022-03-29 PROCEDURE — 96375 TX/PRO/DX INJ NEW DRUG ADDON: CPT

## 2022-03-29 PROCEDURE — 85025 COMPLETE CBC W/AUTO DIFF WBC: CPT

## 2022-03-29 PROCEDURE — 81001 URINALYSIS AUTO W/SCOPE: CPT

## 2022-03-29 PROCEDURE — 70450 CT HEAD/BRAIN W/O DYE: CPT

## 2022-03-29 PROCEDURE — 99284 EMERGENCY DEPT VISIT MOD MDM: CPT

## 2022-03-29 PROCEDURE — 36415 COLL VENOUS BLD VENIPUNCTURE: CPT

## 2022-03-29 PROCEDURE — 80053 COMPREHEN METABOLIC PANEL: CPT

## 2022-03-29 PROCEDURE — 74011250636 HC RX REV CODE- 250/636: Performed by: EMERGENCY MEDICINE

## 2022-03-29 PROCEDURE — 96374 THER/PROPH/DIAG INJ IV PUSH: CPT

## 2022-03-29 RX ORDER — BUTALBITAL, ACETAMINOPHEN AND CAFFEINE 50; 325; 40 MG/1; MG/1; MG/1
1 TABLET ORAL ONCE
Status: DISCONTINUED | OUTPATIENT
Start: 2022-03-29 | End: 2022-03-29

## 2022-03-29 RX ORDER — PROCHLORPERAZINE EDISYLATE 5 MG/ML
10 INJECTION INTRAMUSCULAR; INTRAVENOUS
Status: COMPLETED | OUTPATIENT
Start: 2022-03-29 | End: 2022-03-29

## 2022-03-29 RX ORDER — DIPHENHYDRAMINE HYDROCHLORIDE 50 MG/ML
25 INJECTION, SOLUTION INTRAMUSCULAR; INTRAVENOUS
Status: COMPLETED | OUTPATIENT
Start: 2022-03-29 | End: 2022-03-29

## 2022-03-29 RX ADMIN — PROCHLORPERAZINE EDISYLATE 10 MG: 5 INJECTION INTRAMUSCULAR; INTRAVENOUS at 16:11

## 2022-03-29 RX ADMIN — DIPHENHYDRAMINE HYDROCHLORIDE 25 MG: 50 INJECTION, SOLUTION INTRAMUSCULAR; INTRAVENOUS at 16:09

## 2022-03-29 RX ADMIN — SODIUM CHLORIDE 1000 ML: 9 INJECTION, SOLUTION INTRAVENOUS at 16:00

## 2022-03-29 NOTE — ED PROVIDER NOTES
HPI   43-year-old woman with a past medical history of gestational thrombocytopenia who was 3 days postpartum after vaginal delivery who presents to the emergency department due to headache. Patient had an epidural placed prior to delivering her child. She says that she noticed headache after several hours of her epidural being placed. Her headache was gradual in onset and is slowly gotten worse. Her headache is gotten very bad when she sits up or stands and improves when she lays down. She denies any photophobia, but does endorse visual changes that happen when she was in the hospital.  These did not happen since. She denies abdominal pain, nausea, or vomiting. She is not anticoagulated. Past Medical History:   Diagnosis Date    Anxiety     Asthma     last needed inhaler two months ago.     Gestational thrombocytopenia ([address] 3/24/2022    Headache     Herpes simplex virus (HSV) infection     Psychiatric problem     Zoloft 50mg daily       Past Surgical History:   Procedure Laterality Date    HX OTHER SURGICAL      Tonsillectomy/adnoidectomy    HX TONSILLECTOMY           Family History:   Problem Relation Age of Onset    Other Other         Chronic Obstructive pulmonary diseas     Stroke Other     Heart Disease Other     Hypertension Other     Anemia Other     Anxiety Other     Diabetes Other     Heart Disease Mother        Social History     Socioeconomic History    Marital status:      Spouse name: Not on file    Number of children: Not on file    Years of education: Not on file    Highest education level: Not on file   Occupational History    Not on file   Tobacco Use    Smoking status: Former Smoker     Packs/day: 0.25    Smokeless tobacco: Never Used   Vaping Use    Vaping Use: Never used   Substance and Sexual Activity    Alcohol use: Not Currently    Drug use: No    Sexual activity: Yes     Partners: Male   Other Topics Concern     Service Not Asked    Blood Transfusions Not Asked    Caffeine Concern Not Asked    Occupational Exposure Not Asked    Hobby Hazards Not Asked    Sleep Concern Not Asked    Stress Concern Not Asked    Weight Concern Not Asked    Special Diet Not Asked    Back Care Not Asked    Exercise Not Asked    Bike Helmet Not Asked   [address] Not Asked    Self-Exams Not Asked   Social History Narrative    Not on file     Social Determinants of Health     Financial Resource Strain:     Difficulty of Paying Living Expenses: Not on file   Food Insecurity:     Worried About Running Out of Food in the Last Year: Not on file    [place] of Food in the Last Year: Not on file   Transportation Needs:     Lack of Transportation (Medical): Not on file    Lack of Transportation (Non-Medical): Not on file   Physical Activity:     Days of Exercise per Week: Not on file    Minutes of Exercise per Session: Not on file   Stress:     Feeling of Stress : Not on file   Social Connections:     Frequency of Communication with Friends and Family: Not on file    Frequency of Social Gatherings with Friends and Family: Not on file    Attends Jewish Services: Not on file    Active Member of [address] [place] [address] or Organizations: Not on file    Attends Club or Organization Meetings: Not on file    Marital Status: Not on file   Intimate Partner Violence:     Fear of Current or Ex-Partner: Not on file    Emotionally Abused: Not on file    Physically Abused: Not on file    Sexually Abused: Not on file   Housing Stability:     Unable to Pay for Housing in the Last Year: Not on file    Number of [place] in the Last Year: Not on file    Unstable Housing in the Last Year: Not on file         ALLERGIES: Onion and Vancomycin    Review of Systems   Complete review of systems was performed all systems reviewed are negative unless otherwise documented in the HPI.     Vitals:    03/29/22 1541 03/29/22 1710   BP: 131/78 (!) 114/54   Pulse: (!) 110 66   Resp: 16 16 Temp: 98.2 °F (36.8 °C)    SpO2: 99% 96%   Weight: 79.7 kg (175 lb 11.3 oz)    Height: 5' 9\" (1.753 m)             Physical Exam  Constitutional:       Comments: Patient crying and holding the sides of her head. No diaphoresis   HENT:      Head:      Comments: No appreciable signs of head trauma     Mouth/Throat:      Comments: Moist mucous membranes  Eyes:      Extraocular Movements: Extraocular movements intact. Comments: Pupils are 2 mm and reactive to light bilaterally. Neck:      Comments: Trachea midline  Cardiovascular:      Comments: Tachycardic. Regular rhythm. No murmurs  Pulmonary:      Effort: Pulmonary effort is normal. No respiratory distress. Breath sounds: Normal breath sounds. No wheezing or rales. Abdominal:      General: There is no distension. Palpations: Abdomen is soft. Tenderness: There is no abdominal tenderness. Comments: No focal right upper quadrant tenderness appreciated. Musculoskeletal:         General: Normal range of motion. Cervical back: Normal range of motion. Right lower leg: No edema. Left lower leg: No edema. Skin:     General: Skin is warm and dry. Neurological:      Comments: Awake and alert. Speech is normal.  GCS 15. MDM   58-year-old female presents with the above chief complaint. Vital signs notable for tachycardia with a heart rate of 110. Blood pressure is 131/78 which is not meeting criteria for preeclampsia. She is holding the sides of her head and crying. Initial examination was done in triage and when the patient lays down she is doing much better and is no longer crying. She has a nonfocal neurologic examination. Due to the visual changes that occurred in the hospital I am going to order a CT of the head, though her symptoms seem to be fairly classic for post dural puncture headache. Labs have been sent to check her platelets and LFTs.   She does not have any abdominal pain on exam her right upper quadrant tenderness. No edema noted. Urinalysis will be obtained to look for protein. Blood pressure is down to 114/54 after medications. CT of the head unremarkable. Her platelets are actually up trending. She has some slight increase in her ALT and AST to 59 and 63, but does not have a blood pressure consistent with preeclampsia and again has no right upper quadrant tenderness on exam.  She does not have any protein in her urine either. Anesthesia did visit with the patient and she decided she did not want a blood patch. She is feeling better and wants to return home. Patient will return with any worsening symptoms or new complaints. Patient discharged in stable condition.     Procedures

## 2022-03-29 NOTE — ED TRIAGE NOTES
Patient reports she delivered on Saturday- reports she had an epidural and developed a headache on Saturday- was told she probably has a spinal headache-    Patient presents today with an ongoing headache- reports it is only relieved when lying down.

## 2022-03-29 NOTE — DISCHARGE INSTRUCTIONS
Return to the emergency department for any worsening symptoms. I recommend you take 650 mg of Tylenol every 6 hours as needed for your headache. Return to the emergency department with any worsening symptoms or new concerns.

## 2022-11-03 ENCOUNTER — VIRTUAL VISIT (OUTPATIENT)
Dept: PRIMARY CARE CLINIC | Age: 32
End: 2022-11-03
Payer: COMMERCIAL

## 2022-11-03 DIAGNOSIS — F41.1 GENERALIZED ANXIETY DISORDER: ICD-10-CM

## 2022-11-03 PROCEDURE — 99213 OFFICE O/P EST LOW 20 MIN: CPT | Performed by: NURSE PRACTITIONER

## 2022-11-03 RX ORDER — SERTRALINE HYDROCHLORIDE 50 MG/1
50 TABLET, FILM COATED ORAL DAILY
Qty: 90 TABLET | Refills: 3 | Status: SHIPPED | OUTPATIENT
Start: 2022-11-03

## 2022-11-03 NOTE — PROGRESS NOTES
Chief Complaint   Patient presents with    Medication Refill       There were no vitals taken for this visit. 1. Have you been to the ER, urgent care clinic since your last visit? Hospitalized since your last visit? No    2. Have you seen or consulted any other health care providers outside of the [address] [place] [address] since your last visit? Include any pap smears or colon screening.  No

## 2022-11-03 NOTE — PROGRESS NOTES
HISTORY OF PRESENT ILLNESS  Kirk Brito is a 28 y.o. female presents via telemedicine for follow up on anxiety. Depression/Anxiety:  Patient notes that their anxiety is well controlled on zoloft. States she ran out of her zoloft x2 weeks ago and notes anxiety has been increased. She is 7 month post partum and is still breastfeeding. There were no vitals filed for this visit. Patient Active Problem List   Diagnosis Code    Pregnancy Z34.90    Generalized anxiety disorder F41.1    Palpitations R00.2    Gestational thrombocytopenia ([address] O99.119, D69.6    39 weeks gestation of pregnancy Z3A.39    Encounter for elective induction of labor Z34.90     Patient Active Problem List    Diagnosis Date Noted    Palpitations 03/24/2022    Gestational thrombocytopenia ([address] 03/24/2022    39 weeks gestation of pregnancy 03/24/2022    Encounter for elective induction of labor 03/24/2022    Generalized anxiety disorder 02/02/2022    Pregnancy 12/26/2016     Current Outpatient Medications   Medication Sig Dispense Refill    sertraline (ZOLOFT) 50 mg tablet Take 1 Tablet by mouth daily. 90 Tablet 3    ibuprofen (MOTRIN) 800 mg tablet Take 1 Tablet by mouth every eight (8) hours as needed for Pain. 40 Tablet 0    albuterol (PROVENTIL HFA, VENTOLIN HFA, PROAIR HFA) 90 mcg/actuation inhaler USE 2 PUFFS BY MOUTH EVERY 4 HOURS AS NEEDED. 6.7 Each 1     Allergies   Allergen Reactions    Onion Itching     Red Onions, Makes throat tingly    Vancomycin Itching     Itching and facial redness       Past Medical History:   Diagnosis Date    Anxiety     Asthma     last needed inhaler two months ago.     Gestational thrombocytopenia ([address] 3/24/2022    Headache     Herpes simplex virus (HSV) infection     Psychiatric problem     Zoloft 50mg daily     Past Surgical History:   Procedure Laterality Date    HX OTHER SURGICAL      Tonsillectomy/adnoidectomy    HX TONSILLECTOMY       Family History   Problem Relation Age of Onset Other Other         Chronic Obstructive pulmonary diseas     Stroke Other     Heart Disease Other     Hypertension Other     Anemia Other     Anxiety Other     Diabetes Other     Heart Disease Mother      Social History     Tobacco Use    Smoking status: Former     Packs/day: 0.25     Types: Cigarettes    Smokeless tobacco: Never   Substance Use Topics    Alcohol use: Not Currently           Review of Systems   Constitutional:  Negative for malaise/fatigue and weight loss. Gastrointestinal:  Negative for nausea and vomiting. Psychiatric/Behavioral:  Positive for depression. The patient is nervous/anxious. Physical Exam  Constitutional:       Appearance: Normal appearance. HENT:      Head: Normocephalic. Eyes:      Conjunctiva/sclera: Conjunctivae normal.   Pulmonary:      Effort: Pulmonary effort is normal.   Skin:     General: Skin is warm and dry. Neurological:      Mental Status: She is alert and oriented to person, place, and time. Psychiatric:         Mood and Affect: Mood is depressed. Behavior: Behavior normal.         ASSESSMENT and PLAN  Diagnoses and all orders for this visit:    1. Generalized anxiety disorder  Comments:  well controlled on zoloft. will restart, if needs increase, she will notify via [employer] in 3 weeks. Orders:  -     sertraline (ZOLOFT) 50 mg tablet; Take 1 Tablet by mouth daily. Paulie Hinson, who was evaluated through a synchronous (real-time) audio-video encounter, and/or her healthcare decision maker, is aware that it is a billable service, with coverage as determined by her insurance carrier. She provided verbal consent to proceed: Yes, and patient identification was verified. It was conducted pursuant to the emergency declaration under the [address] [place] [address], [address] [place] [address] authority and the [employer] and [employer] Act. A caregiver was present when appropriate.  Ability to conduct physical exam was limited. I was at home. The patient was at home. Malika Meredith is a 28 y.o. female being evaluated by a Virtual Visit (video visit) encounter to address concerns as mentioned above. A caregiver was present when appropriate. Due to this being a TeleHealth encounter ([facility] emergency), evaluation of the following organ systems was limited: Vitals/Constitutional/EENT/Resp/CV/GI//MS/Neuro/Skin/Heme-Lymph-Imm. Pursuant to the emergency declaration under the [address] [place] [address], [address] [place] [address] authority and the [employer] and [employer] Act, this Virtual Visit was conducted with patient's (and/or legal guardian's) consent, to reduce the risk of exposure to COVID-19 and provide necessary medical care. Services were provided through a video synchronous discussion virtually to substitute for in-person encounter. --Lucía Corea NP on 11/3/2022 at 11:38 AM    An electronic signature was used to authenticate this note.

## 2023-03-16 ENCOUNTER — VIRTUAL VISIT (OUTPATIENT)
Dept: PRIMARY CARE CLINIC | Age: 33
End: 2023-03-16
Payer: COMMERCIAL

## 2023-03-16 DIAGNOSIS — K04.7 DENTAL INFECTION: Primary | ICD-10-CM

## 2023-03-16 PROBLEM — Z3A.39 39 WEEKS GESTATION OF PREGNANCY: Status: RESOLVED | Noted: 2022-03-24 | Resolved: 2023-03-16

## 2023-03-16 PROCEDURE — 99213 OFFICE O/P EST LOW 20 MIN: CPT | Performed by: NURSE PRACTITIONER

## 2023-03-16 RX ORDER — CEFUROXIME AXETIL 500 MG/1
500 TABLET ORAL 2 TIMES DAILY
Qty: 20 TABLET | Refills: 0 | Status: SHIPPED | OUTPATIENT
Start: 2023-03-16

## 2023-03-16 RX ORDER — METRONIDAZOLE 500 MG/1
500 TABLET ORAL 3 TIMES DAILY
Qty: 30 TABLET | Refills: 0 | Status: SHIPPED | OUTPATIENT
Start: 2023-03-16 | End: 2023-03-26

## 2023-03-16 NOTE — PROGRESS NOTES
There were no vitals taken for this visit. Chief Complaint   Patient presents with    Facial Swelling       1. \"Have you been to the ER, urgent care clinic since your last visit? Hospitalized since your last visit? \"  Yes, [facility] for the facial swelling, 03/2023    2. \"Have you seen or consulted any other health care providers outside of the [address] [place] [address] since your last visit? \" No     3. For patients aged 39-70: Has the patient had a colonoscopy / FIT/ Cologuard? NA - based on age      If the patient is female:    4. For patients aged 41-77: Has the patient had a mammogram within the past 2 years? NA - based on age or sex      11. For patients aged 21-65: Has the patient had a pap smear?  Yes - no Care Gap present

## 2023-03-16 NOTE — PROGRESS NOTES
HISTORY OF PRESENT ILLNESS  Eva King is a 35 y.o. female presents via telemedicine for dental pain. Patient reports facial swelling to left side of face near jaw. She states she had a tooth filled on that side about a year ago and \"the dentist filled it to close to my nerve\". She had swelling to that side about 3 weeks ago and was treated with antibiotics (Augmentin). She states the swelling and pain went away but she started having pain and mild swelling again in that area again yesterday and is worried it is coming back. She reports low grade fever of 100. Denies any other symptoms. She has a dentist appointment on April 12. They could not see her sooner. There were no vitals filed for this visit. Patient Active Problem List   Diagnosis Code    Pregnancy Z34.90    Generalized anxiety disorder F41.1    Palpitations R00.2    Gestational thrombocytopenia ([facility] [address] O99.119, D69.6    39 weeks gestation of pregnancy Z3A.39    Encounter for elective induction of labor Z34.90     Patient Active Problem List    Diagnosis Date Noted    Palpitations 03/24/2022    Gestational thrombocytopenia ([facility] [address] 03/24/2022    39 weeks gestation of pregnancy 03/24/2022    Encounter for elective induction of labor 03/24/2022    Generalized anxiety disorder 02/02/2022    Pregnancy 12/26/2016     Current Outpatient Medications   Medication Sig Dispense Refill    cefUROXime (CEFTIN) 500 mg tablet Take 1 Tablet by mouth two (2) times a day. 20 Tablet 0    metroNIDAZOLE (FLAGYL) 500 mg tablet Take 1 Tablet by mouth three (3) times daily for 10 days. 30 Tablet 0    sertraline (ZOLOFT) 50 mg tablet Take 1 Tablet by mouth daily. 90 Tablet 3    ibuprofen (MOTRIN) 800 mg tablet Take 1 Tablet by mouth every eight (8) hours as needed for Pain. 40 Tablet 0    albuterol (PROVENTIL HFA, VENTOLIN HFA, PROAIR HFA) 90 mcg/actuation inhaler USE 2 PUFFS BY MOUTH EVERY 4 HOURS AS NEEDED.  6.7 Each 1     Allergies   Allergen Reactions    Onion Itching     Red Onions, Makes throat tingly    Vancomycin Itching     Itching and facial redness       Past Medical History:   Diagnosis Date    Anxiety     Asthma     last needed inhaler two months ago.  Gestational thrombocytopenia ([address] 3/24/2022    Headache     Herpes simplex virus (HSV) infection     Psychiatric problem     Zoloft 50mg daily     Past Surgical History:   Procedure Laterality Date    HX OTHER SURGICAL      Tonsillectomy/adnoidectomy    HX TONSILLECTOMY       Family History   Problem Relation Age of Onset    Other Other         Chronic Obstructive pulmonary diseas     Stroke Other     Heart Disease Other     Hypertension Other     Anemia Other     Anxiety Other     Diabetes Other     Heart Disease Mother      Social History     Tobacco Use    Smoking status: Former     Packs/day: 0.25     Types: Cigarettes    Smokeless tobacco: Never   Substance Use Topics    Alcohol use: Not Currently           Review of Systems   HENT:          Dental pain     Physical Exam  Constitutional:       General: She is not in acute distress. Appearance: Normal appearance. Pulmonary:      Effort: Pulmonary effort is normal.   Neurological:      Mental Status: She is alert and oriented to person, place, and time. ASSESSMENT and PLAN  Diagnoses and all orders for this visit:    1. Dental infection  Comments: Will treat with ceftin/metronidazole since she already tried Augmentin. Recommended calling dentist for sooner appointment given recurrent pain/infection. RTC or ER if develops fever, redness to face, worsening symptoms. Orders:  -     cefUROXime (CEFTIN) 500 mg tablet; Take 1 Tablet by mouth two (2) times a day. -     metroNIDAZOLE (FLAGYL) 500 mg tablet; Take 1 Tablet by mouth three (3) times daily for 10 days.        Ambreen Morales, who was evaluated through a synchronous (real-time) audio-video encounter, and/or her healthcare decision maker, is aware that it is a billable service, with coverage as determined by her insurance carrier. She provided verbal consent to proceed: Yes, and patient identification was verified. It was conducted pursuant to the emergency declaration under the [address] [place] [address] and the [email or web address] and [employer] Act. A caregiver was present when appropriate. Ability to conduct physical exam was limited. I was at home. The patient was at home. Uvaldo Lima is a 35 y.o. female being evaluated by a Virtual Visit (video visit) encounter to address concerns as mentioned above. A caregiver was present when appropriate. Due to this being a TeleHealth encounter (During FRZVR-19 public health emergency), evaluation of the following organ systems was limited: Vitals/Constitutional/EENT/Resp/CV/GI//MS/Neuro/Skin/Heme-Lymph-Imm. Pursuant to the emergency declaration under the [address] [place] [address] and the [email or web address] and [employer] Act, this Virtual Visit was conducted with patient's (and/or legal guardian's) consent, to reduce the risk of exposure to COVID-19 and provide necessary medical care. Services were provided through a video synchronous discussion virtually to substitute for in-person encounter. --SUSANA Enriquez on 3/16/2023 at 10:35 AM    An electronic signature was used to authenticate this note.

## 2023-05-27 ENCOUNTER — APPOINTMENT (OUTPATIENT)
Facility: HOSPITAL | Age: 33
End: 2023-05-27
Payer: COMMERCIAL

## 2023-05-27 ENCOUNTER — HOSPITAL ENCOUNTER (EMERGENCY)
Facility: HOSPITAL | Age: 33
Discharge: HOME | End: 2023-05-28
Attending: EMERGENCY MEDICINE
Payer: COMMERCIAL

## 2023-05-27 VITALS
BODY MASS INDEX: 23.55 KG/M2 | OXYGEN SATURATION: 99 % | RESPIRATION RATE: 24 BRPM | HEIGHT: 69 IN | DIASTOLIC BLOOD PRESSURE: 85 MMHG | HEART RATE: 78 BPM | TEMPERATURE: 98.4 F | WEIGHT: 159 LBS | SYSTOLIC BLOOD PRESSURE: 121 MMHG

## 2023-05-27 DIAGNOSIS — R68.89 SENSATION OF SWOLLEN THROAT: Primary | ICD-10-CM

## 2023-05-27 LAB
ANION GAP SERPL CALC-SCNC: 12 MMOL/L (ref 5–15)
BASOPHILS # BLD: 0.1 K/UL (ref 0–1)
BASOPHILS NFR BLD: 1 % (ref 0–1)
BUN SERPL-MCNC: 11 MG/DL (ref 6–20)
BUN/CREAT SERPL: 15 (ref 12–20)
CALCIUM SERPL-MCNC: 9.3 MG/DL (ref 8.6–10)
CHLORIDE SERPL-SCNC: 105 MMOL/L (ref 98–107)
CO2 SERPL-SCNC: 26 MMOL/L (ref 22–29)
COMMENT:: NORMAL
CREAT SERPL-MCNC: 0.74 MG/DL (ref 0.5–0.9)
DIFFERENTIAL METHOD BLD: ABNORMAL
EOSINOPHIL # BLD: 0.2 K/UL (ref 0–0.4)
EOSINOPHIL NFR BLD: 2 %
ERYTHROCYTE [DISTWIDTH] IN BLOOD BY AUTOMATED COUNT: 14.6 % (ref 11.5–14.5)
GLUCOSE SERPL-MCNC: 92 MG/DL (ref 65–100)
HCT VFR BLD AUTO: 41.2 % (ref 35–47)
HGB BLD-MCNC: 13.9 G/DL (ref 11.5–16)
IMM GRANULOCYTES # BLD AUTO: 0 K/UL (ref 0–0.04)
IMM GRANULOCYTES NFR BLD AUTO: 0 % (ref 0–0.5)
LYMPHOCYTES # BLD: 1.9 K/UL (ref 0.8–3.5)
LYMPHOCYTES NFR BLD: 29 % (ref 12–49)
MCH RBC QN AUTO: 32.4 PG (ref 26–34)
MCHC RBC AUTO-ENTMCNC: 33.7 G/DL (ref 30–36.5)
MCV RBC AUTO: 96 FL (ref 80–99)
MONOCYTES # BLD: 0.6 K/UL (ref 0–1)
MONOCYTES NFR BLD: 9 % (ref 5–13)
NEUTS SEG # BLD: 3.9 K/UL (ref 1.8–8)
NEUTS SEG NFR BLD: 59 % (ref 32–75)
NRBC # BLD: 0 K/UL (ref 0–0.01)
NRBC BLD-RTO: 0 PER 100 WBC
PLATELET # BLD AUTO: 188 K/UL (ref 150–400)
PMV BLD AUTO: 11.4 FL (ref 8.9–12.9)
POTASSIUM SERPL-SCNC: 3.7 MMOL/L (ref 3.5–5.1)
RBC # BLD AUTO: 4.29 M/UL (ref 3.8–5.2)
SODIUM SERPL-SCNC: 143 MMOL/L (ref 136–145)
SPECIMEN HOLD: NORMAL
WBC # BLD AUTO: 6.6 K/UL (ref 3.6–11)

## 2023-05-27 PROCEDURE — 6360000002 HC RX W HCPCS: Performed by: EMERGENCY MEDICINE

## 2023-05-27 PROCEDURE — 85025 COMPLETE CBC W/AUTO DIFF WBC: CPT

## 2023-05-27 PROCEDURE — 96374 THER/PROPH/DIAG INJ IV PUSH: CPT

## 2023-05-27 PROCEDURE — 36415 COLL VENOUS BLD VENIPUNCTURE: CPT

## 2023-05-27 PROCEDURE — 80048 BASIC METABOLIC PNL TOTAL CA: CPT

## 2023-05-27 PROCEDURE — 99285 EMERGENCY DEPT VISIT HI MDM: CPT

## 2023-05-27 PROCEDURE — 70491 CT SOFT TISSUE NECK W/DYE: CPT

## 2023-05-27 PROCEDURE — 6360000004 HC RX CONTRAST MEDICATION: Performed by: EMERGENCY MEDICINE

## 2023-05-27 RX ORDER — DEXAMETHASONE SODIUM PHOSPHATE 10 MG/ML
10 INJECTION, SOLUTION INTRAMUSCULAR; INTRAVENOUS
Status: COMPLETED | OUTPATIENT
Start: 2023-05-27 | End: 2023-05-27

## 2023-05-27 RX ADMIN — IOPAMIDOL 100 ML: 755 INJECTION, SOLUTION INTRAVENOUS at 23:13

## 2023-05-27 RX ADMIN — DEXAMETHASONE SODIUM PHOSPHATE 10 MG: 10 INJECTION INTRAMUSCULAR; INTRAVENOUS at 23:32

## 2023-05-27 ASSESSMENT — PAIN - FUNCTIONAL ASSESSMENT: PAIN_FUNCTIONAL_ASSESSMENT: NONE - DENIES PAIN

## 2023-05-27 ASSESSMENT — LIFESTYLE VARIABLES
HOW OFTEN DO YOU HAVE A DRINK CONTAINING ALCOHOL: 2-4 TIMES A MONTH
HOW MANY STANDARD DRINKS CONTAINING ALCOHOL DO YOU HAVE ON A TYPICAL DAY: 3 OR 4

## 2023-06-21 NOTE — DISCHARGE INSTRUCTIONS
Continue with antibiotic ointment and apply to eyelids. Start eyedrops for treatment of pinkeye. Follow-up with your eye doctor. Thank you for allowing us to provide you with medical care today. We realize that you have many choices for your emergency care needs. We thank you for choosing [facility].  Please choose us in the future for any continued health care needs. We hope we addressed all of your medical concerns. We strive to provide excellent quality care in the Emergency Department. Anything less than excellent does not meet our expectations. The exam and treatment you received in the Emergency Department were for an emergent problem and are not intended as complete care. It is important that you follow up with a doctor, nurse practitioner, or [phone] assistant for ongoing care. If your symptoms worsen or you do not improve as expected and you are unable to reach your usual health care provider, you should return to the Emergency Department. We are available 24 hours a day. Take this sheet with you when you go to your follow-up visit. If you have any problem arranging the follow-up visit, contact the Emergency Department immediately. Make an appointment your family doctor for follow up of this visit. Return to the ER if you are unable to be seen in a timely manner. asymptomatic

## 2023-06-26 RX ORDER — ALBUTEROL SULFATE 90 UG/1
AEROSOL, METERED RESPIRATORY (INHALATION)
Qty: 6.7 EACH | Refills: 2 | Status: SHIPPED | OUTPATIENT
Start: 2023-06-26

## 2023-06-30 DIAGNOSIS — F41.1 GENERALIZED ANXIETY DISORDER: ICD-10-CM

## 2023-08-14 ENCOUNTER — OFFICE VISIT (OUTPATIENT)
Age: 33
End: 2023-08-14

## 2023-08-14 VITALS
TEMPERATURE: 98.5 F | SYSTOLIC BLOOD PRESSURE: 120 MMHG | RESPIRATION RATE: 14 BRPM | DIASTOLIC BLOOD PRESSURE: 82 MMHG | OXYGEN SATURATION: 99 % | HEART RATE: 87 BPM | BODY MASS INDEX: 23.96 KG/M2 | WEIGHT: 161.8 LBS | HEIGHT: 69 IN

## 2023-08-14 DIAGNOSIS — Z11.1 SCREENING-PULMONARY TB: Primary | ICD-10-CM

## 2023-08-14 RX ORDER — VALACYCLOVIR HYDROCHLORIDE 1 G/1
1000 TABLET, FILM COATED ORAL DAILY
COMMUNITY
Start: 2023-07-17

## 2023-08-14 ASSESSMENT — ENCOUNTER SYMPTOMS
GASTROINTESTINAL NEGATIVE: 1
APNEA: 0
CHEST TIGHTNESS: 0
COUGH: 0
CHOKING: 0
WHEEZING: 0

## 2023-08-14 NOTE — PROGRESS NOTES
Gisela Narvaez ( 1990) is a 35 y.o. female, New Patient patient, here for evaluation of the following chief complaint(s): Other (TB ASSESSMENT )   TB screening. Information provided by, or accompanied by:   Patient. ASSESSMENT/PLAN:  Roldan Gaspar was seen today for other. Diagnoses and all orders for this visit:    Screening-pulmonary TB         See [facility] TB screening form scanned into EHR. Negative risk factors , cleared for work. Return in about 1 year (around 8/14/2024), or if symptoms worsen or fail to improve, for TB screening. Lexi Mary History provided by:  Patient   used: No    Other  Location:  TB questionare screening only. Associated symptoms: no cough, no fatigue, no fever and no wheezing      Review of Systems   Constitutional: Negative. Negative for diaphoresis, fatigue and fever. HENT: Negative. Respiratory:  Negative for apnea, cough, choking, chest tightness and wheezing. Cardiovascular: Negative. Gastrointestinal: Negative. Skin: Negative. Neurological: Negative. Subjective:   Pt is a 35 y.o. female     Past Medical History:   Diagnosis Date    39 weeks gestation of pregnancy 3/24/2022    Anxiety     Asthma     last needed inhaler two months ago. Gestational thrombocytopenia ([address]) 3/24/2022    Headache     Herpes simplex virus (HSV) infection     Pregnancy 12/26/2016    Psychiatric problem     Zoloft 50mg daily       Past Surgical History:   Procedure Laterality Date    OTHER SURGICAL HISTORY      Tonsillectomy/adnoidectomy    TONSILLECTOMY           No results found for this visit on 08/14/23. Objective:     Physical Exam  Vitals and nursing note reviewed. Constitutional:       Appearance: Normal appearance. She is normal weight. HENT:      Head: Normocephalic and atraumatic. Nose: Nose normal.   Eyes:      Extraocular Movements: Extraocular movements intact.       Conjunctiva/sclera:

## 2023-10-25 ENCOUNTER — HOSPITAL ENCOUNTER (EMERGENCY)
Facility: HOSPITAL | Age: 33
Discharge: HOME | End: 2023-10-25
Attending: EMERGENCY MEDICINE
Payer: COMMERCIAL

## 2023-10-25 ENCOUNTER — APPOINTMENT (OUTPATIENT)
Facility: HOSPITAL | Age: 33
End: 2023-10-25
Payer: COMMERCIAL

## 2023-10-25 VITALS
RESPIRATION RATE: 16 BRPM | DIASTOLIC BLOOD PRESSURE: 86 MMHG | TEMPERATURE: 98.3 F | WEIGHT: 154.65 LBS | HEART RATE: 90 BPM | HEIGHT: 69 IN | SYSTOLIC BLOOD PRESSURE: 123 MMHG | OXYGEN SATURATION: 100 % | BODY MASS INDEX: 22.91 KG/M2

## 2023-10-25 DIAGNOSIS — N13.30 HYDRONEPHROSIS, RIGHT: ICD-10-CM

## 2023-10-25 DIAGNOSIS — R10.13 ABDOMINAL PAIN, EPIGASTRIC: Primary | ICD-10-CM

## 2023-10-25 LAB
ALBUMIN SERPL-MCNC: 4.6 G/DL (ref 3.5–5.2)
ALBUMIN/GLOB SERPL: 1.9 (ref 1.1–2.2)
ALP SERPL-CCNC: 84 U/L (ref 35–104)
ALT SERPL-CCNC: 13 U/L (ref 10–35)
ANION GAP SERPL CALC-SCNC: 8 MMOL/L (ref 5–15)
APPEARANCE UR: CLEAR
AST SERPL-CCNC: 10 U/L (ref 10–35)
BACTERIA URNS QL MICRO: NEGATIVE /HPF
BASOPHILS # BLD: 0.1 K/UL (ref 0–1)
BASOPHILS NFR BLD: 1 % (ref 0–1)
BILIRUB SERPL-MCNC: 0.2 MG/DL (ref 0.2–1)
BILIRUB UR QL: NEGATIVE
BUN SERPL-MCNC: 11 MG/DL (ref 6–20)
BUN/CREAT SERPL: 14 (ref 12–20)
CALCIUM SERPL-MCNC: 9.1 MG/DL (ref 8.6–10)
CHLORIDE SERPL-SCNC: 106 MMOL/L (ref 98–107)
CO2 SERPL-SCNC: 25 MMOL/L (ref 22–29)
COLOR UR: ABNORMAL
COMMENT:: NORMAL
CREAT SERPL-MCNC: 0.79 MG/DL (ref 0.5–0.9)
DIFFERENTIAL METHOD BLD: ABNORMAL
EOSINOPHIL # BLD: 0.1 K/UL (ref 0–0.4)
EOSINOPHIL NFR BLD: 2 %
EPITH CASTS URNS QL MICRO: ABNORMAL /LPF
ERYTHROCYTE [DISTWIDTH] IN BLOOD BY AUTOMATED COUNT: 13.2 % (ref 11.5–14.5)
GLOBULIN SER CALC-MCNC: 2.4 G/DL (ref 2–4)
GLUCOSE SERPL-MCNC: 91 MG/DL (ref 65–100)
GLUCOSE UR STRIP.AUTO-MCNC: NEGATIVE MG/DL
HCG UR QL: NEGATIVE
HCT VFR BLD AUTO: 39.2 % (ref 35–47)
HGB BLD-MCNC: 12.8 G/DL (ref 11.5–16)
HGB UR QL STRIP: ABNORMAL
IMM GRANULOCYTES # BLD AUTO: 0 K/UL (ref 0–0.04)
IMM GRANULOCYTES NFR BLD AUTO: 0 % (ref 0–0.5)
KETONES UR QL STRIP.AUTO: NEGATIVE MG/DL
LEUKOCYTE ESTERASE UR QL STRIP.AUTO: NEGATIVE
LIPASE SERPL-CCNC: 37 U/L (ref 13–60)
LYMPHOCYTES # BLD: 1.5 K/UL (ref 0.8–3.5)
LYMPHOCYTES NFR BLD: 23 % (ref 12–49)
MCH RBC QN AUTO: 32.7 PG (ref 26–34)
MCHC RBC AUTO-ENTMCNC: 32.7 G/DL (ref 30–36.5)
MCV RBC AUTO: 100.3 FL (ref 80–99)
MONOCYTES # BLD: 0.6 K/UL (ref 0–1)
MONOCYTES NFR BLD: 9 % (ref 5–13)
NEUTS SEG # BLD: 4.4 K/UL (ref 1.8–8)
NEUTS SEG NFR BLD: 65 % (ref 32–75)
NITRITE UR QL STRIP.AUTO: NEGATIVE
NRBC # BLD: 0 K/UL (ref 0–0.01)
NRBC BLD-RTO: 0 PER 100 WBC
PH UR STRIP: 7 (ref 5–8)
PLATELET # BLD AUTO: 141 K/UL (ref 150–400)
PMV BLD AUTO: 10.3 FL (ref 8.9–12.9)
POTASSIUM SERPL-SCNC: 3.8 MMOL/L (ref 3.5–5.1)
PROT SERPL-MCNC: 7 G/DL (ref 6.4–8.3)
PROT UR STRIP-MCNC: NEGATIVE MG/DL
RBC # BLD AUTO: 3.91 M/UL (ref 3.8–5.2)
RBC #/AREA URNS HPF: ABNORMAL /HPF
SODIUM SERPL-SCNC: 139 MMOL/L (ref 136–145)
SP GR UR REFRACTOMETRY: 1.01 (ref 1–1.03)
SPECIMEN HOLD: NORMAL
URINE CULTURE IF INDICATED: ABNORMAL
UROBILINOGEN UR QL STRIP.AUTO: 0.2 EU/DL (ref 0.2–1)
WBC # BLD AUTO: 6.7 K/UL (ref 3.6–11)
WBC URNS QL MICRO: ABNORMAL /HPF (ref 0–4)

## 2023-10-25 PROCEDURE — 6370000000 HC RX 637 (ALT 250 FOR IP): Performed by: EMERGENCY MEDICINE

## 2023-10-25 PROCEDURE — 81025 URINE PREGNANCY TEST: CPT

## 2023-10-25 PROCEDURE — 6360000004 HC RX CONTRAST MEDICATION: Performed by: EMERGENCY MEDICINE

## 2023-10-25 PROCEDURE — 81001 URINALYSIS AUTO W/SCOPE: CPT

## 2023-10-25 PROCEDURE — 99285 EMERGENCY DEPT VISIT HI MDM: CPT

## 2023-10-25 PROCEDURE — 83690 ASSAY OF LIPASE: CPT

## 2023-10-25 PROCEDURE — 36415 COLL VENOUS BLD VENIPUNCTURE: CPT

## 2023-10-25 PROCEDURE — 74177 CT ABD & PELVIS W/CONTRAST: CPT

## 2023-10-25 PROCEDURE — 85025 COMPLETE CBC W/AUTO DIFF WBC: CPT

## 2023-10-25 PROCEDURE — 80053 COMPREHEN METABOLIC PANEL: CPT

## 2023-10-25 RX ORDER — ONDANSETRON 4 MG/1
4 TABLET, ORALLY DISINTEGRATING ORAL ONCE
Status: COMPLETED | OUTPATIENT
Start: 2023-10-25 | End: 2023-10-25

## 2023-10-25 RX ADMIN — ALUMINUM HYDROXIDE AND MAGNESIUM HYDROXIDE 30 ML: 200; 200 SUSPENSION ORAL at 19:36

## 2023-10-25 RX ADMIN — IOPAMIDOL 100 ML: 755 INJECTION, SOLUTION INTRAVENOUS at 19:50

## 2023-10-25 RX ADMIN — ONDANSETRON 4 MG: 4 TABLET, ORALLY DISINTEGRATING ORAL at 19:36

## 2023-10-25 ASSESSMENT — ENCOUNTER SYMPTOMS
EYE PAIN: 0
SHORTNESS OF BREATH: 0
ABDOMINAL PAIN: 1
BACK PAIN: 0
CHEST TIGHTNESS: 0
NAUSEA: 1
VOMITING: 0
SORE THROAT: 0

## 2023-10-25 ASSESSMENT — PAIN - FUNCTIONAL ASSESSMENT: PAIN_FUNCTIONAL_ASSESSMENT: 0-10

## 2023-10-25 ASSESSMENT — PAIN SCALES - GENERAL: PAINLEVEL_OUTOF10: 6

## 2023-10-25 ASSESSMENT — PAIN DESCRIPTION - LOCATION: LOCATION: ABDOMEN

## 2023-10-25 ASSESSMENT — PAIN DESCRIPTION - ORIENTATION: ORIENTATION: LEFT;UPPER

## 2023-10-25 NOTE — ED TRIAGE NOTES
Patient with hx of enlarged spleen is having left upper quadrant pain, states it feels the same as when her spleen is enlarged.

## 2023-10-25 NOTE — ED NOTES
Pt.states that she is having LUQ pain that started this morning. Some radiation into chest.  Feels sharp and full per patient. slight nausea per patient    Pt.states hx of enlarged spleen x 2 but as child only.   States she called her mom who couldn't remember reason why     Obey Joe RN  10/25/23 1910

## 2023-11-28 ENCOUNTER — APPOINTMENT (OUTPATIENT)
Facility: HOSPITAL | Age: 33
End: 2023-11-28
Payer: COMMERCIAL

## 2023-11-28 ENCOUNTER — HOSPITAL ENCOUNTER (EMERGENCY)
Facility: HOSPITAL | Age: 33
Discharge: HOME | End: 2023-11-28
Attending: STUDENT IN AN ORGANIZED HEALTH CARE EDUCATION/TRAINING PROGRAM
Payer: COMMERCIAL

## 2023-11-28 VITALS
SYSTOLIC BLOOD PRESSURE: 122 MMHG | TEMPERATURE: 98.9 F | OXYGEN SATURATION: 100 % | RESPIRATION RATE: 18 BRPM | DIASTOLIC BLOOD PRESSURE: 85 MMHG | HEART RATE: 72 BPM

## 2023-11-28 DIAGNOSIS — F17.200 TOBACCO DEPENDENCE: ICD-10-CM

## 2023-11-28 DIAGNOSIS — R07.9 CHEST PAIN, UNSPECIFIED TYPE: Primary | ICD-10-CM

## 2023-11-28 LAB
ALBUMIN SERPL-MCNC: 4.6 G/DL (ref 3.5–5.2)
ALBUMIN/GLOB SERPL: 2.1 (ref 1.1–2.2)
ALP SERPL-CCNC: 85 U/L (ref 35–104)
ALT SERPL-CCNC: 8 U/L (ref 10–35)
ANION GAP SERPL CALC-SCNC: 7 MMOL/L (ref 5–15)
AST SERPL-CCNC: 11 U/L (ref 10–35)
BASOPHILS # BLD: 0 K/UL (ref 0–1)
BASOPHILS NFR BLD: 1 % (ref 0–1)
BILIRUB SERPL-MCNC: 0.2 MG/DL (ref 0.2–1)
BUN SERPL-MCNC: 12 MG/DL (ref 6–20)
BUN/CREAT SERPL: 17 (ref 12–20)
CALCIUM SERPL-MCNC: 8.9 MG/DL (ref 8.6–10)
CHLORIDE SERPL-SCNC: 105 MMOL/L (ref 98–107)
CO2 SERPL-SCNC: 27 MMOL/L (ref 22–29)
CREAT SERPL-MCNC: 0.69 MG/DL (ref 0.5–0.9)
DIFFERENTIAL METHOD BLD: ABNORMAL
EOSINOPHIL # BLD: 0.1 K/UL (ref 0–0.4)
EOSINOPHIL NFR BLD: 2 %
ERYTHROCYTE [DISTWIDTH] IN BLOOD BY AUTOMATED COUNT: 12.8 % (ref 11.5–14.5)
GLOBULIN SER CALC-MCNC: 2.2 G/DL (ref 2–4)
GLUCOSE SERPL-MCNC: 91 MG/DL (ref 65–100)
HCT VFR BLD AUTO: 39.7 % (ref 35–47)
HGB BLD-MCNC: 12.8 G/DL (ref 11.5–16)
IMM GRANULOCYTES # BLD AUTO: 0 K/UL (ref 0–0.04)
IMM GRANULOCYTES NFR BLD AUTO: 0 % (ref 0–0.5)
LYMPHOCYTES # BLD: 1.4 K/UL (ref 0.8–3.5)
LYMPHOCYTES NFR BLD: 25 % (ref 12–49)
MCH RBC QN AUTO: 32.6 PG (ref 26–34)
MCHC RBC AUTO-ENTMCNC: 32.2 G/DL (ref 30–36.5)
MCV RBC AUTO: 101 FL (ref 80–99)
MONOCYTES # BLD: 0.4 K/UL (ref 0–1)
MONOCYTES NFR BLD: 8 % (ref 5–13)
NEUTS SEG # BLD: 3.6 K/UL (ref 1.8–8)
NEUTS SEG NFR BLD: 64 % (ref 32–75)
NRBC # BLD: 0 K/UL (ref 0–0.01)
NRBC BLD-RTO: 0 PER 100 WBC
PLATELET # BLD AUTO: 144 K/UL (ref 150–400)
PMV BLD AUTO: 10.4 FL (ref 8.9–12.9)
POTASSIUM SERPL-SCNC: 3.8 MMOL/L (ref 3.5–5.1)
PROT SERPL-MCNC: 6.8 G/DL (ref 6.4–8.3)
RBC # BLD AUTO: 3.93 M/UL (ref 3.8–5.2)
SODIUM SERPL-SCNC: 139 MMOL/L (ref 136–145)
TROPONIN I BLD-MCNC: <0.04 NG/ML (ref 0–0.08)
WBC # BLD AUTO: 5.6 K/UL (ref 3.6–11)

## 2023-11-28 PROCEDURE — 36415 COLL VENOUS BLD VENIPUNCTURE: CPT

## 2023-11-28 PROCEDURE — 71046 X-RAY EXAM CHEST 2 VIEWS: CPT

## 2023-11-28 PROCEDURE — 99285 EMERGENCY DEPT VISIT HI MDM: CPT

## 2023-11-28 PROCEDURE — 84484 ASSAY OF TROPONIN QUANT: CPT

## 2023-11-28 PROCEDURE — 93005 ELECTROCARDIOGRAM TRACING: CPT | Performed by: STUDENT IN AN ORGANIZED HEALTH CARE EDUCATION/TRAINING PROGRAM

## 2023-11-28 PROCEDURE — 85025 COMPLETE CBC W/AUTO DIFF WBC: CPT

## 2023-11-28 PROCEDURE — 80053 COMPREHEN METABOLIC PANEL: CPT

## 2023-11-28 ASSESSMENT — PAIN DESCRIPTION - PAIN TYPE: TYPE: ACUTE PAIN

## 2023-11-28 ASSESSMENT — PAIN DESCRIPTION - LOCATION: LOCATION: CHEST

## 2023-11-28 ASSESSMENT — PAIN SCALES - GENERAL: PAINLEVEL_OUTOF10: 4

## 2023-11-28 ASSESSMENT — PAIN DESCRIPTION - DESCRIPTORS: DESCRIPTORS: ACHING;DISCOMFORT

## 2023-11-28 ASSESSMENT — PAIN DESCRIPTION - ORIENTATION: ORIENTATION: MID

## 2023-11-28 ASSESSMENT — PAIN DESCRIPTION - DIRECTION: RADIATING_TOWARDS: NECK, BACK

## 2023-11-28 NOTE — ED TRIAGE NOTES
Patient arrives c/o of central chest pain that started this morning radiating to neck, chest, and back. Denies fever, SOB, nausea, diarphoresis. Reporting being under a lot of emtional and financial stress as single mom of 2 kids. EKG in triage NSR. Denies hx of cardiac problems. Takes sertraline.

## 2023-11-28 NOTE — ED NOTES
Patient does not appear to be in any acute distress/shows no evidence of clinical instability at this time. Provider has reviewed discharge instructions with the patient/family. The patient/family verbalized understanding instructions as well as need for follow up for any further symptoms. Discharge papers given, education provided, and any questions answered.         Eb Arevalo RN  11/28/23 [id number]

## 2023-11-29 LAB
EKG ATRIAL RATE: 69 BPM
EKG DIAGNOSIS: NORMAL
EKG P AXIS: 37 DEGREES
EKG P-R INTERVAL: 158 MS
EKG Q-T INTERVAL: 408 MS
EKG QRS DURATION: 84 MS
EKG QTC CALCULATION (BAZETT): 437 MS
EKG R AXIS: 0 DEGREES
EKG T AXIS: 21 DEGREES
EKG VENTRICULAR RATE: 69 BPM

## 2023-11-29 PROCEDURE — 93010 ELECTROCARDIOGRAM REPORT: CPT | Performed by: STUDENT IN AN ORGANIZED HEALTH CARE EDUCATION/TRAINING PROGRAM

## 2023-11-29 NOTE — ED PROVIDER NOTES
[facility] EMERGENCY DEPT  EMERGENCY DEPARTMENT ENCOUNTER      Pt Name: Reuben Thrasher  MRN: [id number]  [address] [place] 1990  Date of evaluation: 11/28/2023  Provider: Michelle Samayoa       Chief Complaint   Patient presents with    Chest Pain         HISTORY OF PRESENT ILLNESS   (Location/Symptom, Timing/Onset, Context/Setting, Quality, Duration, Modifying Factors, Severity)  Note limiting factors. The patient is a 66-year-old female history of tobacco use and asthma presenting today secondary to chest pain. She reports that this pain started several hours ago while at rest.  Nothing active better, nothing makes it worse. The pain radiates to the left neck and down the left arm. No shortness of breath or cough. No history of DVT or PE. No recent travel or immobility. No OCP use. She does smoke. Review of External Medical Records:     Nursing Notes were reviewed. REVIEW OF SYSTEMS    (2-9 systems for level 4, 10 or more for level 5)     Review of Systems   Cardiovascular:  Positive for chest pain. Except as noted above the remainder of the review of systems was reviewed and negative. PAST MEDICAL HISTORY     Past Medical History:   Diagnosis Date    39 weeks gestation of pregnancy 3/24/2022    Anxiety     Asthma     last needed inhaler two months ago.     Gestational thrombocytopenia ([address]) 3/24/2022    Headache     Herpes simplex virus (HSV) infection     Pregnancy 12/26/2016    Psychiatric problem     Zoloft 50mg daily         SURGICAL HISTORY       Past Surgical History:   Procedure Laterality Date    OTHER SURGICAL HISTORY      Tonsillectomy/adnoidectomy    TONSILLECTOMY           CURRENT MEDICATIONS       Discharge Medication List as of 11/28/2023  4:19 PM        CONTINUE these medications which have NOT CHANGED    Details   valACYclovir (VALTREX) 1 g tablet Take 1 tablet by mouth dailyHistorical Med      sertraline (ZOLOFT) 50 MG tablet TAKE 1

## 2024-01-23 NOTE — H&P
History and Physical    Patient: Freya De La Garza MRN: [id number]  SSN: [id number]    YOB: 1990  Age: 28 y.o. Sex: female      Subjective:      Freya De La Garza is a 28 y.o. female [id number] at 29 6/7 weeks comes in for decreased FM. She states she has felt movement today, but much less than usual.  She also has had a few non painful wesley renner, nothing regular. No VB, no LOF. Denies complications with pregnancy    POB:   X3  G4- current, no complications to date    Pgyn:  Denies STI, chart states + HSV      Past Medical History:   Diagnosis Date    Anxiety     Asthma     last needed inhaler two months ago.  Headache     Herpes simplex virus (HSV) infection     Psychiatric problem     Zoloft 50mg daily     Past Surgical History:   Procedure Laterality Date    HX OTHER SURGICAL      Tonsillectomy/adnoidectomy    HX TONSILLECTOMY        Family History   Problem Relation Age of Onset    Other Other         Chronic Obstructive pulmonary diseas     Stroke Other     Heart Disease Other     Hypertension Other     Anemia Other     Anxiety Other     Diabetes Other     Heart Disease Mother      Social History     Tobacco Use    Smoking status: Former Smoker     Packs/day: 0.25    Smokeless tobacco: Never Used   Substance Use Topics    Alcohol use: Not Currently      Prior to Admission medications    Medication Sig Start Date End Date Taking? Authorizing Provider   Classic Prenatal 28 mg iron- 800 mcg tab  12/15/21  Yes Provider, Historical   famotidine (PEPCID) 20 mg tablet Take 20 mg by mouth two (2) times a day. 22  Yes Provider, Historical   sertraline (ZOLOFT) 50 mg tablet Take 1 Tablet by mouth daily.  22  Yes Katelynn Lopes NP   albuterol (PROVENTIL HFA, VENTOLIN HFA, PROAIR HFA) 90 mcg/actuation inhaler USE 2 PUFFS BY MOUTH EVERY 4 HOURS AS NEEDED. 22  Yes Shayy Quijano NP   pyridoxine, vitamin B6, (Vitamin B-6) 100 mg tablet Take 100 mg by mouth daily. Yes Provider, Historical   ferrous sulfate 325 mg (65 mg iron) tablet  12/15/21   Provider, Historical   hydrOXYzine pamoate (VISTARIL) 25 mg capsule Take 1 Cap by mouth three (3) times daily as needed for Anxiety. Patient not taking: Reported on 2/18/2022 3/1/21   Dasha Enciso NP        Allergies   Allergen Reactions    Onion Itching     Red Onions, Makes throat tingly    Vancomycin Itching     Itching and facial redness         Review of Systems:  A comprehensive review of systems was negative except for that written in the History of Present Illness. Objective:     Vitals:    02/20/22 2019 02/20/22 2020   BP: 125/81    Pulse: 85    Resp: 18    Temp: 98.1 °F (36.7 °C)    SpO2: 100%    Weight:  81.2 kg (179 lb)   Height:  5' 9\" (1.753 m)        Physical Exam:  GENERAL: alert, cooperative, no distress, appears stated age  LUNG: clear to auscultation bilaterally  HEART: regular rate and rhythm, S1, S2 normal, no murmur, click, rub or gallop  ABDOMEN: gravid, c/w GA, NT< ND  SKIN: Normal.      FHT: 150 with + accesl, no decels, moderate variablility, Cat I, reactive  Twilight: rare  SVE: cl/50/high  vtx    Assessment:     A/ decreased FM    Plan:     P/ monitor, kick counts and re evaluate.     Signed By: Mickey Penn MD     February 20, 2022 Age

## 2024-01-27 ENCOUNTER — APPOINTMENT (OUTPATIENT)
Facility: HOSPITAL | Age: 34
End: 2024-01-27
Payer: COMMERCIAL

## 2024-01-27 ENCOUNTER — HOSPITAL ENCOUNTER (EMERGENCY)
Facility: HOSPITAL | Age: 34
Discharge: HOME | End: 2024-01-27
Attending: EMERGENCY MEDICINE
Payer: COMMERCIAL

## 2024-01-27 VITALS
TEMPERATURE: 99 F | SYSTOLIC BLOOD PRESSURE: 135 MMHG | DIASTOLIC BLOOD PRESSURE: 97 MMHG | HEART RATE: 72 BPM | RESPIRATION RATE: 18 BRPM | OXYGEN SATURATION: 99 %

## 2024-01-27 DIAGNOSIS — J01.90 ACUTE SINUSITIS, RECURRENCE NOT SPECIFIED, UNSPECIFIED LOCATION: Primary | ICD-10-CM

## 2024-01-27 LAB
BASOPHILS # BLD: 0 K/UL (ref 0–1)
BASOPHILS NFR BLD: 0 % (ref 0–1)
CA-I BLD-SCNC: 1.24 MMOL/L (ref 1.12–1.32)
DIFFERENTIAL METHOD BLD: ABNORMAL
EOSINOPHIL # BLD: 0.2 K/UL (ref 0–0.4)
EOSINOPHIL NFR BLD: 2 %
ERYTHROCYTE [DISTWIDTH] IN BLOOD BY AUTOMATED COUNT: 13.1 % (ref 11.5–14.5)
HCT VFR BLD AUTO: 38 % (ref 35–47)
HGB BLD-MCNC: 12.3 G/DL (ref 11.5–16)
IMM GRANULOCYTES # BLD AUTO: 0 K/UL (ref 0–0.04)
IMM GRANULOCYTES NFR BLD AUTO: 0 % (ref 0–0.5)
LYMPHOCYTES # BLD: 1.9 K/UL (ref 0.8–3.5)
LYMPHOCYTES NFR BLD: 27 % (ref 12–49)
MCH RBC QN AUTO: 32.3 PG (ref 26–34)
MCHC RBC AUTO-ENTMCNC: 32.4 G/DL (ref 30–36.5)
MCV RBC AUTO: 99.7 FL (ref 80–99)
MONOCYTES # BLD: 0.6 K/UL (ref 0–1)
MONOCYTES NFR BLD: 8 % (ref 5–13)
NEUTS SEG # BLD: 4.5 K/UL (ref 1.8–8)
NEUTS SEG NFR BLD: 63 % (ref 32–75)
NRBC # BLD: 0 K/UL (ref 0–0.01)
NRBC BLD-RTO: 0 PER 100 WBC
PLATELET # BLD AUTO: 147 K/UL (ref 150–400)
PMV BLD AUTO: 10.4 FL (ref 8.9–12.9)
RBC # BLD AUTO: 3.81 M/UL (ref 3.8–5.2)
SERVICE CMNT-IMP: NORMAL
WBC # BLD AUTO: 7.2 K/UL (ref 3.6–11)

## 2024-01-27 PROCEDURE — 6360000004 HC RX CONTRAST MEDICATION: Performed by: EMERGENCY MEDICINE

## 2024-01-27 PROCEDURE — 70481 CT ORBIT/EAR/FOSSA W/DYE: CPT

## 2024-01-27 PROCEDURE — 6370000000 HC RX 637 (ALT 250 FOR IP): Performed by: EMERGENCY MEDICINE

## 2024-01-27 PROCEDURE — 99285 EMERGENCY DEPT VISIT HI MDM: CPT

## 2024-01-27 PROCEDURE — 82330 ASSAY OF CALCIUM: CPT

## 2024-01-27 PROCEDURE — 85025 COMPLETE CBC W/AUTO DIFF WBC: CPT

## 2024-01-27 PROCEDURE — 36415 COLL VENOUS BLD VENIPUNCTURE: CPT

## 2024-01-27 RX ORDER — TETRACAINE HYDROCHLORIDE 5 MG/ML
1 SOLUTION OPHTHALMIC ONCE
Status: COMPLETED | OUTPATIENT
Start: 2024-01-27 | End: 2024-01-27

## 2024-01-27 RX ORDER — DOXYCYCLINE HYCLATE 100 MG
100 TABLET ORAL
Status: DISCONTINUED | OUTPATIENT
Start: 2024-01-27 | End: 2024-01-28 | Stop reason: HOSPADM

## 2024-01-27 RX ORDER — DOXYCYCLINE HYCLATE 100 MG
100 TABLET ORAL 2 TIMES DAILY
Qty: 20 TABLET | Refills: 0 | Status: SHIPPED | OUTPATIENT
Start: 2024-01-27 | End: 2024-02-06

## 2024-01-27 RX ADMIN — IOPAMIDOL 100 ML: 755 INJECTION, SOLUTION INTRAVENOUS at 21:11

## 2024-01-27 RX ADMIN — TETRACAINE HYDROCHLORIDE 1 DROP: 5 SOLUTION OPHTHALMIC at 20:07

## 2024-01-27 RX ADMIN — FLUORESCEIN SODIUM 1 MG: 1 STRIP OPHTHALMIC at 20:07

## 2024-01-27 ASSESSMENT — LIFESTYLE VARIABLES
HOW OFTEN DO YOU HAVE A DRINK CONTAINING ALCOHOL: NEVER
HOW MANY STANDARD DRINKS CONTAINING ALCOHOL DO YOU HAVE ON A TYPICAL DAY: PATIENT DOES NOT DRINK

## 2024-01-27 ASSESSMENT — PAIN SCALES - GENERAL: PAINLEVEL_OUTOF10: 5

## 2024-01-27 ASSESSMENT — TONOMETRY
IOP_HANDHELD: 1
OS_IOP_MMHG: 14

## 2024-01-27 ASSESSMENT — PAIN - FUNCTIONAL ASSESSMENT: PAIN_FUNCTIONAL_ASSESSMENT: 0-10

## 2024-01-27 ASSESSMENT — VISUAL ACUITY: OU: 1

## 2024-02-19 ENCOUNTER — APPOINTMENT (OUTPATIENT)
Facility: HOSPITAL | Age: 34
End: 2024-02-19
Payer: COMMERCIAL

## 2024-02-19 ENCOUNTER — HOSPITAL ENCOUNTER (EMERGENCY)
Facility: HOSPITAL | Age: 34
Discharge: HOME | End: 2024-02-19
Attending: STUDENT IN AN ORGANIZED HEALTH CARE EDUCATION/TRAINING PROGRAM
Payer: COMMERCIAL

## 2024-02-19 VITALS
HEART RATE: 90 BPM | SYSTOLIC BLOOD PRESSURE: 127 MMHG | DIASTOLIC BLOOD PRESSURE: 90 MMHG | OXYGEN SATURATION: 98 % | TEMPERATURE: 98.6 F | RESPIRATION RATE: 20 BRPM | BODY MASS INDEX: 23.7 KG/M2 | HEIGHT: 69 IN | WEIGHT: 160 LBS

## 2024-02-19 DIAGNOSIS — R10.12 ABDOMINAL PAIN, LEFT UPPER QUADRANT: Primary | ICD-10-CM

## 2024-02-19 LAB
ALBUMIN SERPL-MCNC: 4.3 G/DL (ref 3.5–5.2)
ALBUMIN/GLOB SERPL: 1.7 (ref 1.1–2.2)
ALP SERPL-CCNC: 87 U/L (ref 35–104)
ALT SERPL-CCNC: 10 U/L (ref 10–35)
ANION GAP SERPL CALC-SCNC: 11 MMOL/L (ref 5–15)
AST SERPL-CCNC: 11 U/L (ref 10–35)
BASOPHILS # BLD: 0 K/UL (ref 0–0.1)
BASOPHILS NFR BLD: 0 % (ref 0–1)
BILIRUB SERPL-MCNC: 0.2 MG/DL (ref 0.2–1)
BUN SERPL-MCNC: 14 MG/DL (ref 6–20)
BUN/CREAT SERPL: 17 (ref 12–20)
CALCIUM SERPL-MCNC: 9.1 MG/DL (ref 8.6–10)
CHLORIDE SERPL-SCNC: 105 MMOL/L (ref 98–107)
CO2 SERPL-SCNC: 24 MMOL/L (ref 22–29)
COMMENT:: NORMAL
CREAT SERPL-MCNC: 0.81 MG/DL (ref 0.5–0.9)
DIFFERENTIAL METHOD BLD: ABNORMAL
EOSINOPHIL # BLD: 0.2 K/UL (ref 0–0.4)
EOSINOPHIL NFR BLD: 2 % (ref 0–7)
ERYTHROCYTE [DISTWIDTH] IN BLOOD BY AUTOMATED COUNT: 13 % (ref 11.5–14.5)
GLOBULIN SER CALC-MCNC: 2.5 G/DL (ref 2–4)
GLUCOSE SERPL-MCNC: 81 MG/DL (ref 65–100)
HCT VFR BLD AUTO: 37.5 % (ref 35–47)
HGB BLD-MCNC: 12.3 G/DL (ref 11.5–16)
IMM GRANULOCYTES # BLD AUTO: 0 K/UL (ref 0–0.04)
IMM GRANULOCYTES NFR BLD AUTO: 0 % (ref 0–0.5)
LIPASE SERPL-CCNC: 31 U/L (ref 13–60)
LYMPHOCYTES # BLD: 1.6 K/UL (ref 0.8–3.5)
LYMPHOCYTES NFR BLD: 19 % (ref 12–49)
MCH RBC QN AUTO: 32.3 PG (ref 26–34)
MCHC RBC AUTO-ENTMCNC: 32.8 G/DL (ref 30–36.5)
MCV RBC AUTO: 98.4 FL (ref 80–99)
MONOCYTES # BLD: 0.7 K/UL (ref 0–1)
MONOCYTES NFR BLD: 9 % (ref 5–13)
NEUTS SEG # BLD: 5.8 K/UL (ref 1.8–8)
NEUTS SEG NFR BLD: 70 % (ref 32–75)
NRBC # BLD: 0 K/UL (ref 0–0.01)
NRBC BLD-RTO: 0 PER 100 WBC
PLATELET # BLD AUTO: 135 K/UL (ref 150–400)
PMV BLD AUTO: 10.5 FL (ref 8.9–12.9)
POTASSIUM SERPL-SCNC: 3.5 MMOL/L (ref 3.5–5.1)
PROT SERPL-MCNC: 6.8 G/DL (ref 6.4–8.3)
RBC # BLD AUTO: 3.81 M/UL (ref 3.8–5.2)
SODIUM SERPL-SCNC: 140 MMOL/L (ref 136–145)
SPECIMEN HOLD: NORMAL
WBC # BLD AUTO: 8.3 K/UL (ref 3.6–11)

## 2024-02-19 PROCEDURE — 76705 ECHO EXAM OF ABDOMEN: CPT

## 2024-02-19 PROCEDURE — 71046 X-RAY EXAM CHEST 2 VIEWS: CPT

## 2024-02-19 PROCEDURE — 6370000000 HC RX 637 (ALT 250 FOR IP): Performed by: STUDENT IN AN ORGANIZED HEALTH CARE EDUCATION/TRAINING PROGRAM

## 2024-02-19 PROCEDURE — 36415 COLL VENOUS BLD VENIPUNCTURE: CPT

## 2024-02-19 PROCEDURE — 80053 COMPREHEN METABOLIC PANEL: CPT

## 2024-02-19 PROCEDURE — 83690 ASSAY OF LIPASE: CPT

## 2024-02-19 PROCEDURE — 99284 EMERGENCY DEPT VISIT MOD MDM: CPT

## 2024-02-19 PROCEDURE — 85025 COMPLETE CBC W/AUTO DIFF WBC: CPT

## 2024-02-19 RX ORDER — LIDOCAINE 4 G/G
2 PATCH TOPICAL
Status: DISCONTINUED | OUTPATIENT
Start: 2024-02-19 | End: 2024-02-19 | Stop reason: HOSPADM

## 2024-02-19 RX ORDER — IBUPROFEN 600 MG/1
600 TABLET ORAL
Status: COMPLETED | OUTPATIENT
Start: 2024-02-19 | End: 2024-02-19

## 2024-02-19 RX ORDER — METHOCARBAMOL 750 MG/1
750 TABLET, FILM COATED ORAL 4 TIMES DAILY PRN
Qty: 20 TABLET | Refills: 0 | Status: SHIPPED | OUTPATIENT
Start: 2024-02-19 | End: 2024-02-22

## 2024-02-19 RX ADMIN — IBUPROFEN 600 MG: 600 TABLET, FILM COATED ORAL at 19:43

## 2024-02-19 ASSESSMENT — PAIN DESCRIPTION - ORIENTATION: ORIENTATION: LEFT;UPPER

## 2024-02-19 ASSESSMENT — PAIN - FUNCTIONAL ASSESSMENT
PAIN_FUNCTIONAL_ASSESSMENT: 0-10
PAIN_FUNCTIONAL_ASSESSMENT: ACTIVITIES ARE NOT PREVENTED

## 2024-02-19 ASSESSMENT — PAIN DESCRIPTION - DESCRIPTORS: DESCRIPTORS: SHARP

## 2024-02-19 ASSESSMENT — PAIN SCALES - GENERAL: PAINLEVEL_OUTOF10: 8

## 2024-02-19 ASSESSMENT — PAIN DESCRIPTION - LOCATION: LOCATION: ABDOMEN

## 2024-02-19 ASSESSMENT — PAIN DESCRIPTION - PAIN TYPE: TYPE: ACUTE PAIN

## 2024-02-20 ENCOUNTER — HOSPITAL ENCOUNTER (EMERGENCY)
Facility: HOSPITAL | Age: 34
Discharge: HOME | End: 2024-02-20
Attending: EMERGENCY MEDICINE
Payer: COMMERCIAL

## 2024-02-20 ENCOUNTER — APPOINTMENT (OUTPATIENT)
Facility: HOSPITAL | Age: 34
End: 2024-02-20
Payer: COMMERCIAL

## 2024-02-20 VITALS
TEMPERATURE: 98.3 F | SYSTOLIC BLOOD PRESSURE: 125 MMHG | DIASTOLIC BLOOD PRESSURE: 91 MMHG | HEART RATE: 100 BPM | OXYGEN SATURATION: 96 % | RESPIRATION RATE: 16 BRPM | BODY MASS INDEX: 23.83 KG/M2 | WEIGHT: 161.38 LBS

## 2024-02-20 DIAGNOSIS — K29.00 ACUTE GASTRITIS WITHOUT HEMORRHAGE, UNSPECIFIED GASTRITIS TYPE: ICD-10-CM

## 2024-02-20 DIAGNOSIS — R10.812 LEFT UPPER QUADRANT ABDOMINAL TENDERNESS WITHOUT REBOUND TENDERNESS: Primary | ICD-10-CM

## 2024-02-20 LAB
ALBUMIN SERPL-MCNC: 4 G/DL (ref 3.5–5)
ALBUMIN/GLOB SERPL: 1.2 (ref 1.1–2.2)
ALP SERPL-CCNC: 86 U/L (ref 45–117)
ALT SERPL-CCNC: 15 U/L (ref 12–78)
ANION GAP SERPL CALC-SCNC: 1 MMOL/L (ref 5–15)
APPEARANCE UR: CLEAR
AST SERPL-CCNC: 8 U/L (ref 15–37)
BACTERIA URNS QL MICRO: NEGATIVE /HPF
BASOPHILS # BLD: 0 K/UL (ref 0–0.1)
BASOPHILS NFR BLD: 0 % (ref 0–1)
BILIRUB SERPL-MCNC: 0.4 MG/DL (ref 0.2–1)
BILIRUB UR QL: NEGATIVE
BUN SERPL-MCNC: 11 MG/DL (ref 6–20)
BUN/CREAT SERPL: 14 (ref 12–20)
CALCIUM SERPL-MCNC: 8.9 MG/DL (ref 8.5–10.1)
CHLORIDE SERPL-SCNC: 110 MMOL/L (ref 97–108)
CO2 SERPL-SCNC: 27 MMOL/L (ref 21–32)
COLOR UR: ABNORMAL
COMMENT:: NORMAL
CREAT SERPL-MCNC: 0.79 MG/DL (ref 0.55–1.02)
DIFFERENTIAL METHOD BLD: ABNORMAL
EOSINOPHIL # BLD: 0.1 K/UL (ref 0–0.4)
EOSINOPHIL NFR BLD: 1 % (ref 0–7)
EPITH CASTS URNS QL MICRO: ABNORMAL /LPF
ERYTHROCYTE [DISTWIDTH] IN BLOOD BY AUTOMATED COUNT: 12.9 % (ref 11.5–14.5)
GLOBULIN SER CALC-MCNC: 3.4 G/DL (ref 2–4)
GLUCOSE SERPL-MCNC: 96 MG/DL (ref 65–100)
GLUCOSE UR STRIP.AUTO-MCNC: NEGATIVE MG/DL
HCG UR QL: NEGATIVE
HCT VFR BLD AUTO: 41 % (ref 35–47)
HGB BLD-MCNC: 13.6 G/DL (ref 11.5–16)
HGB UR QL STRIP: ABNORMAL
HYALINE CASTS URNS QL MICRO: ABNORMAL /LPF (ref 0–5)
IMM GRANULOCYTES # BLD AUTO: 0 K/UL (ref 0–0.04)
IMM GRANULOCYTES NFR BLD AUTO: 0 % (ref 0–0.5)
KETONES UR QL STRIP.AUTO: NEGATIVE MG/DL
LEUKOCYTE ESTERASE UR QL STRIP.AUTO: NEGATIVE
LIPASE SERPL-CCNC: 33 U/L (ref 13–75)
LYMPHOCYTES # BLD: 0.9 K/UL (ref 0.8–3.5)
LYMPHOCYTES NFR BLD: 13 % (ref 12–49)
MCH RBC QN AUTO: 32.5 PG (ref 26–34)
MCHC RBC AUTO-ENTMCNC: 33.2 G/DL (ref 30–36.5)
MCV RBC AUTO: 98.1 FL (ref 80–99)
MONOCYTES # BLD: 0.6 K/UL (ref 0–1)
MONOCYTES NFR BLD: 8 % (ref 5–13)
NEUTS SEG # BLD: 5.5 K/UL (ref 1.8–8)
NEUTS SEG NFR BLD: 78 % (ref 32–75)
NITRITE UR QL STRIP.AUTO: NEGATIVE
NRBC # BLD: 0 K/UL (ref 0–0.01)
NRBC BLD-RTO: 0 PER 100 WBC
PH UR STRIP: 5.5 (ref 5–8)
PLATELET # BLD AUTO: 142 K/UL (ref 150–400)
PMV BLD AUTO: 10.7 FL (ref 8.9–12.9)
POTASSIUM SERPL-SCNC: 3.7 MMOL/L (ref 3.5–5.1)
PROT SERPL-MCNC: 7.4 G/DL (ref 6.4–8.2)
PROT UR STRIP-MCNC: NEGATIVE MG/DL
RBC # BLD AUTO: 4.18 M/UL (ref 3.8–5.2)
RBC #/AREA URNS HPF: ABNORMAL /HPF (ref 0–5)
SODIUM SERPL-SCNC: 138 MMOL/L (ref 136–145)
SP GR UR REFRACTOMETRY: 1.02 (ref 1–1.03)
SPECIMEN HOLD: NORMAL
SPECIMEN HOLD: NORMAL
UROBILINOGEN UR QL STRIP.AUTO: 0.2 EU/DL (ref 0.2–1)
WBC # BLD AUTO: 7 K/UL (ref 3.6–11)
WBC URNS QL MICRO: ABNORMAL /HPF (ref 0–4)

## 2024-02-20 PROCEDURE — 36415 COLL VENOUS BLD VENIPUNCTURE: CPT

## 2024-02-20 PROCEDURE — 99285 EMERGENCY DEPT VISIT HI MDM: CPT

## 2024-02-20 PROCEDURE — 74177 CT ABD & PELVIS W/CONTRAST: CPT

## 2024-02-20 PROCEDURE — 83690 ASSAY OF LIPASE: CPT

## 2024-02-20 PROCEDURE — 2500000003 HC RX 250 WO HCPCS: Performed by: STUDENT IN AN ORGANIZED HEALTH CARE EDUCATION/TRAINING PROGRAM

## 2024-02-20 PROCEDURE — 96374 THER/PROPH/DIAG INJ IV PUSH: CPT

## 2024-02-20 PROCEDURE — A4216 STERILE WATER/SALINE, 10 ML: HCPCS | Performed by: STUDENT IN AN ORGANIZED HEALTH CARE EDUCATION/TRAINING PROGRAM

## 2024-02-20 PROCEDURE — 6370000000 HC RX 637 (ALT 250 FOR IP): Performed by: STUDENT IN AN ORGANIZED HEALTH CARE EDUCATION/TRAINING PROGRAM

## 2024-02-20 PROCEDURE — 96375 TX/PRO/DX INJ NEW DRUG ADDON: CPT

## 2024-02-20 PROCEDURE — 80053 COMPREHEN METABOLIC PANEL: CPT

## 2024-02-20 PROCEDURE — 6360000002 HC RX W HCPCS: Performed by: STUDENT IN AN ORGANIZED HEALTH CARE EDUCATION/TRAINING PROGRAM

## 2024-02-20 PROCEDURE — 81025 URINE PREGNANCY TEST: CPT

## 2024-02-20 PROCEDURE — 81001 URINALYSIS AUTO W/SCOPE: CPT

## 2024-02-20 PROCEDURE — 85025 COMPLETE CBC W/AUTO DIFF WBC: CPT

## 2024-02-20 PROCEDURE — 6360000004 HC RX CONTRAST MEDICATION: Performed by: RADIOLOGY

## 2024-02-20 PROCEDURE — 2580000003 HC RX 258: Performed by: STUDENT IN AN ORGANIZED HEALTH CARE EDUCATION/TRAINING PROGRAM

## 2024-02-20 RX ORDER — MORPHINE SULFATE 4 MG/ML
4 INJECTION, SOLUTION INTRAMUSCULAR; INTRAVENOUS ONCE
Status: DISCONTINUED | OUTPATIENT
Start: 2024-02-20 | End: 2024-02-20

## 2024-02-20 RX ORDER — PANTOPRAZOLE SODIUM 40 MG/1
40 TABLET, DELAYED RELEASE ORAL DAILY
Qty: 30 TABLET | Refills: 2 | Status: SHIPPED | OUTPATIENT
Start: 2024-02-20 | End: 2024-03-21

## 2024-02-20 RX ORDER — KETOROLAC TROMETHAMINE 30 MG/ML
30 INJECTION, SOLUTION INTRAMUSCULAR; INTRAVENOUS
Status: COMPLETED | OUTPATIENT
Start: 2024-02-20 | End: 2024-02-20

## 2024-02-20 RX ORDER — ONDANSETRON 4 MG/1
4 TABLET, ORALLY DISINTEGRATING ORAL 3 TIMES DAILY PRN
Qty: 21 TABLET | Refills: 0 | Status: SHIPPED | OUTPATIENT
Start: 2024-02-20

## 2024-02-20 RX ORDER — SUCRALFATE 1 G/1
1 TABLET ORAL 4 TIMES DAILY
Qty: 56 TABLET | Refills: 0 | Status: SHIPPED | OUTPATIENT
Start: 2024-02-20 | End: 2024-03-05

## 2024-02-20 RX ADMIN — FAMOTIDINE 20 MG: 10 INJECTION, SOLUTION INTRAVENOUS at 14:29

## 2024-02-20 RX ADMIN — IOPAMIDOL 100 ML: 755 INJECTION, SOLUTION INTRAVENOUS at 13:29

## 2024-02-20 RX ADMIN — Medication 40 ML: at 14:32

## 2024-02-20 RX ADMIN — KETOROLAC TROMETHAMINE 30 MG: 30 INJECTION, SOLUTION INTRAMUSCULAR; INTRAVENOUS at 12:54

## 2024-02-20 ASSESSMENT — ENCOUNTER SYMPTOMS
VOMITING: 0
ABDOMINAL PAIN: 1
SHORTNESS OF BREATH: 0
NAUSEA: 0
SHORTNESS OF BREATH: 0

## 2024-02-20 ASSESSMENT — PAIN DESCRIPTION - DESCRIPTORS
DESCRIPTORS: SHARP
DESCRIPTORS: SHARP

## 2024-02-20 ASSESSMENT — PAIN SCALES - GENERAL
PAINLEVEL_OUTOF10: 8
PAINLEVEL_OUTOF10: 9

## 2024-02-20 ASSESSMENT — PAIN - FUNCTIONAL ASSESSMENT
PAIN_FUNCTIONAL_ASSESSMENT: PREVENTS OR INTERFERES SOME ACTIVE ACTIVITIES AND ADLS
PAIN_FUNCTIONAL_ASSESSMENT: 0-10
PAIN_FUNCTIONAL_ASSESSMENT: ACTIVITIES ARE NOT PREVENTED

## 2024-02-20 ASSESSMENT — PAIN DESCRIPTION - ONSET: ONSET: ON-GOING

## 2024-02-20 ASSESSMENT — PAIN DESCRIPTION - LOCATION
LOCATION: ABDOMEN
LOCATION: ABDOMEN

## 2024-02-20 ASSESSMENT — PAIN DESCRIPTION - ORIENTATION
ORIENTATION: LEFT;MID
ORIENTATION: UPPER

## 2024-02-20 ASSESSMENT — PAIN DESCRIPTION - FREQUENCY: FREQUENCY: CONTINUOUS

## 2024-02-20 ASSESSMENT — PAIN DESCRIPTION - PAIN TYPE: TYPE: ACUTE PAIN

## 2024-02-20 NOTE — ED PROVIDER NOTES
[facility] EMERGENCY DEPT  EMERGENCY DEPARTMENT ENCOUNTER      Pt Name: Gisselle Herr  MRN: [id number]  Birthdate 1990  Date of evaluation: 2/19/2024  Provider: Lavell Sy MD    CHIEF COMPLAINT       Chief Complaint   Patient presents with    Abdominal Pain         HISTORY OF PRESENT ILLNESS   34-year-old female with history significant for asthma, anxiety presents to the ED with chief complaint of left upper quadrant abdominal pain.  Patient says pain has been ongoing for the past 3 to 4 days, is worse with deep breaths.  She has history of splenomegaly in the past and is concerned pain is related to her spleen.  No fevers, chills, chest pain, difficulty breathing, nausea, vomiting, urinary symptoms, or bowel symptoms.  No treatment prior to coming to the emergency room.    The history is provided by the patient.       Review of External Medical Records:     Nursing Notes were reviewed.    REVIEW OF SYSTEMS       Review of Systems   Respiratory:  Negative for shortness of breath.    Cardiovascular:  Negative for chest pain.       Except as noted above the remainder of the review of systems was reviewed and negative.       PAST MEDICAL HISTORY     Past Medical History:   Diagnosis Date    39 weeks gestation of pregnancy 3/24/2022    Anxiety     Asthma     last needed inhaler two months ago.    Gestational thrombocytopenia (HCC) 3/24/2022    Headache     Herpes simplex virus (HSV) infection     Pregnancy 12/26/2016    Psychiatric problem     Zoloft 50mg daily         SURGICAL HISTORY       Past Surgical History:   Procedure Laterality Date    OTHER SURGICAL HISTORY      Tonsillectomy/adnoidectomy    TONSILLECTOMY           CURRENT MEDICATIONS       Discharge Medication List as of 2/19/2024  8:28 PM        CONTINUE these medications which have NOT CHANGED    Details   nicotine (NICODERM CQ) 7 MG/24HR Place 1 patch onto the skin daily for 14 days, Disp-14 patch, R-0Normal      valACYclovir (VALTREX) 1

## 2024-02-20 NOTE — ED TRIAGE NOTES
Patient presents from home with complaints of upper abdominal pain that started yesterday. Patient reports she was seen at the [facility] ER yesterday and they told her she had an enlarged spleen. Patient reports history of same. Patient reports she has had increased pian and nausea since being seen

## 2024-02-20 NOTE — ED TRIAGE NOTES
Patient here with complaints of LUQ pain that started on Friday, hurts when she breathes.   Patient denies any fever, chills, nausea or vomiting.   Patient reports diarrhea the past few day, endorses chronic diarrhea.

## 2024-02-20 NOTE — ED PROVIDER NOTES
[facility] EMERGENCY DEP  EMERGENCY DEPARTMENT ENCOUNTER      Pt Name: Gisselle Herr  MRN: [id number]  Birthdate 1990  Date of evaluation: 2/20/2024  Provider: William Shore DO    CHIEF COMPLAINT       Chief Complaint   Patient presents with    Abdominal Pain         HISTORY OF PRESENT ILLNESS   (Location/Symptom, Timing/Onset, Context/Setting, Quality, Duration, Modifying Factors, Severity)  Note limiting factors.   Patient is a 34-year-old female history of asthma, thrombocytopenia presenting today secondary to left upper quadrant abdominal pain.  This has been going on for the past several days.  It is present constantly, sharp in nature.  It does feel worse with eating.  Has had nausea but no vomiting.  No black or bloody stools.  Does admit to NSAID use, alcohol use and tobacco use.  Was seen at an outpatient facility yesterday and had left lower quadrant ultrasound showing splenomegaly per my chart review.  Patient states that the symptoms are not getting any better and she is actually feeling worse.  She states that the pain radiates up into her left shoulder at times.            Review of External Medical Records:     Nursing Notes were reviewed.    REVIEW OF SYSTEMS    (2-9 systems for level 4, 10 or more for level 5)     Review of Systems   Constitutional:  Negative for fever.   Respiratory:  Negative for shortness of breath.    Gastrointestinal:  Positive for abdominal pain. Negative for nausea and vomiting.   Genitourinary:  Negative for dysuria.       Except as noted above the remainder of the review of systems was reviewed and negative.       PAST MEDICAL HISTORY     Past Medical History:   Diagnosis Date    39 weeks gestation of pregnancy 3/24/2022    Anxiety     Asthma     last needed inhaler two months ago.    Gestational thrombocytopenia (HCC) 3/24/2022    Headache     Herpes simplex virus (HSV) infection     Pregnancy 12/26/2016    Psychiatric problem     Zoloft 50mg daily

## 2024-02-22 ENCOUNTER — OFFICE VISIT (OUTPATIENT)
Dept: PRIMARY CARE CLINIC | Facility: CLINIC | Age: 34
End: 2024-02-22
Payer: COMMERCIAL

## 2024-02-22 VITALS
SYSTOLIC BLOOD PRESSURE: 110 MMHG | HEART RATE: 75 BPM | HEIGHT: 69 IN | BODY MASS INDEX: 23.82 KG/M2 | TEMPERATURE: 97.9 F | DIASTOLIC BLOOD PRESSURE: 79 MMHG | WEIGHT: 160.8 LBS

## 2024-02-22 DIAGNOSIS — R16.1 SPLEEN ENLARGED: Primary | ICD-10-CM

## 2024-02-22 DIAGNOSIS — D69.1 ABNORMAL PLATELETS (HCC): ICD-10-CM

## 2024-02-22 DIAGNOSIS — K27.9 PEPTIC ULCER: ICD-10-CM

## 2024-02-22 PROCEDURE — 99213 OFFICE O/P EST LOW 20 MIN: CPT | Performed by: NURSE PRACTITIONER

## 2024-02-22 SDOH — ECONOMIC STABILITY: INCOME INSECURITY: HOW HARD IS IT FOR YOU TO PAY FOR THE VERY BASICS LIKE FOOD, HOUSING, MEDICAL CARE, AND HEATING?: NOT HARD AT ALL

## 2024-02-22 SDOH — ECONOMIC STABILITY: HOUSING INSECURITY
IN THE LAST 12 MONTHS, WAS THERE A TIME WHEN YOU DID NOT HAVE A STEADY PLACE TO SLEEP OR SLEPT IN A SHELTER (INCLUDING NOW)?: NO

## 2024-02-22 SDOH — ECONOMIC STABILITY: FOOD INSECURITY: WITHIN THE PAST 12 MONTHS, YOU WORRIED THAT YOUR FOOD WOULD RUN OUT BEFORE YOU GOT MONEY TO BUY MORE.: NEVER TRUE

## 2024-02-22 SDOH — ECONOMIC STABILITY: FOOD INSECURITY: WITHIN THE PAST 12 MONTHS, THE FOOD YOU BOUGHT JUST DIDN'T LAST AND YOU DIDN'T HAVE MONEY TO GET MORE.: NEVER TRUE

## 2024-02-22 ASSESSMENT — ENCOUNTER SYMPTOMS
NAUSEA: 1
VOMITING: 0
ABDOMINAL PAIN: 1
BLOOD IN STOOL: 0
CONSTIPATION: 0
DIARRHEA: 0

## 2024-02-22 ASSESSMENT — PATIENT HEALTH QUESTIONNAIRE - PHQ9
SUM OF ALL RESPONSES TO PHQ9 QUESTIONS 1 & 2: 0
SUM OF ALL RESPONSES TO PHQ QUESTIONS 1-9: 0
2. FEELING DOWN, DEPRESSED OR HOPELESS: 0
1. LITTLE INTEREST OR PLEASURE IN DOING THINGS: 0
SUM OF ALL RESPONSES TO PHQ QUESTIONS 1-9: 0

## 2024-02-22 NOTE — PROGRESS NOTES
\"Have you been to the ER, urgent care clinic since your last visit?  Hospitalized since your last visit?\"    YES - When: approximately 2 days ago.  Where and Why: abd pain.    “Have you seen or consulted any other health care providers outside of [facility] since your last visit?”    NO        “Have you had a pap smear?”    YES - Where:  Nurse/CMA to request most recent records if not in the chart

## 2024-02-22 NOTE — PROGRESS NOTES
Gisselle Herr is a 34 y.o. female presents for    Chief Complaint   Patient presents with    Follow-up     F/u from ER. Has gone to ER 2times Having really bad upper stomach pain. ENLARGED spleen and also was diagnosed with having a ulcer.    .    ASSESSMENT and PLAN  Gisselle was seen today for follow-up.    Diagnoses and all orders for this visit:    Spleen enlarged  Comments:  She was told to follow up with hematology.  Orders:  -     [facility] Arnulfo Monreal MD, Hematology/Oncology, [place]    Peptic ulcer  Comments:  being treated with protonix and carafate. she is feeling better. she has follow up with GI.    Abnormal platelets (HCC)  Comments:  Patient reports history of low platelets, has never been worked up.  Orders:  -     Arnulfo Moreno MD, Hematology/Oncology, [place]       Patient reports improvement in her symptoms today. Continue current treatment. She will follow up with GI and hematology. We discussed dietary changes, avoiding spicy/acidic/tomato based foods and other foods that trigger reflux/ulcers. Red flag sx reviewed and when to go to ED. Pt verbalized understanding      HISTORY OF PRESENT ILLNESS  Gisselle Herr is a 34 y.o. female presents for    Chief Complaint   Patient presents with    Follow-up     F/u from ER. Has gone to ER 2times Having really bad upper stomach pain. ENLARGED spleen and also was diagnosed with having a ulcer.    .    Patient went to the ED on 2/19 and 2/20 for upper abdominal pain. She was diagnosed with enlarged spleen on 2/19 by ultrasound. She has a history of enlarged spleen in the past.  On 2/20 she had a CT scan which showed enlarged spleen and peptic ulcer. She was started on protonix and carafate for the ulcer. She states she is feeling better today. She has follow up with GI. She was also told to follow up with hematology d/t low platelets and enlarged spleen. She reports nausea but no vomiting, diarrhea or constipation.     Vitals:

## 2024-03-06 RX ORDER — ALBUTEROL SULFATE 90 UG/1
AEROSOL, METERED RESPIRATORY (INHALATION)
Qty: 8.5 EACH | Refills: 2 | Status: SHIPPED | OUTPATIENT
Start: 2024-03-06

## 2024-03-12 ENCOUNTER — ANESTHESIA (OUTPATIENT)
Facility: HOSPITAL | Age: 34
End: 2024-03-12
Payer: COMMERCIAL

## 2024-03-12 ENCOUNTER — HOSPITAL ENCOUNTER (OUTPATIENT)
Facility: HOSPITAL | Age: 34
Setting detail: OUTPATIENT SURGERY
Discharge: HOME | End: 2024-03-12
Attending: SPECIALIST | Admitting: SPECIALIST
Payer: COMMERCIAL

## 2024-03-12 ENCOUNTER — ANESTHESIA EVENT (OUTPATIENT)
Facility: HOSPITAL | Age: 34
End: 2024-03-12
Payer: COMMERCIAL

## 2024-03-12 VITALS
SYSTOLIC BLOOD PRESSURE: 114 MMHG | WEIGHT: 159 LBS | BODY MASS INDEX: 23.55 KG/M2 | RESPIRATION RATE: 15 BRPM | HEART RATE: 82 BPM | TEMPERATURE: 99.5 F | HEIGHT: 69 IN | OXYGEN SATURATION: 100 % | DIASTOLIC BLOOD PRESSURE: 74 MMHG

## 2024-03-12 PROCEDURE — 88305 TISSUE EXAM BY PATHOLOGIST: CPT

## 2024-03-12 PROCEDURE — 2580000003 HC RX 258: Performed by: SPECIALIST

## 2024-03-12 PROCEDURE — 2500000003 HC RX 250 WO HCPCS

## 2024-03-12 PROCEDURE — 3600007502: Performed by: SPECIALIST

## 2024-03-12 PROCEDURE — 7100000010 HC PHASE II RECOVERY - FIRST 15 MIN: Performed by: SPECIALIST

## 2024-03-12 PROCEDURE — 3700000000 HC ANESTHESIA ATTENDED CARE: Performed by: SPECIALIST

## 2024-03-12 PROCEDURE — 7100000011 HC PHASE II RECOVERY - ADDTL 15 MIN: Performed by: SPECIALIST

## 2024-03-12 PROCEDURE — 2709999900 HC NON-CHARGEABLE SUPPLY: Performed by: SPECIALIST

## 2024-03-12 PROCEDURE — 6360000002 HC RX W HCPCS

## 2024-03-12 RX ORDER — SODIUM CHLORIDE 9 MG/ML
25 INJECTION, SOLUTION INTRAVENOUS PRN
Status: DISCONTINUED | OUTPATIENT
Start: 2024-03-12 | End: 2024-03-12 | Stop reason: HOSPADM

## 2024-03-12 RX ORDER — SODIUM CHLORIDE 0.9 % (FLUSH) 0.9 %
5-40 SYRINGE (ML) INJECTION EVERY 12 HOURS SCHEDULED
Status: DISCONTINUED | OUTPATIENT
Start: 2024-03-12 | End: 2024-03-12 | Stop reason: HOSPADM

## 2024-03-12 RX ORDER — LIDOCAINE HYDROCHLORIDE 20 MG/ML
INJECTION, SOLUTION EPIDURAL; INFILTRATION; INTRACAUDAL; PERINEURAL PRN
Status: DISCONTINUED | OUTPATIENT
Start: 2024-03-12 | End: 2024-03-12 | Stop reason: SDUPTHER

## 2024-03-12 RX ORDER — SODIUM CHLORIDE 9 MG/ML
INJECTION, SOLUTION INTRAVENOUS CONTINUOUS
Status: DISCONTINUED | OUTPATIENT
Start: 2024-03-12 | End: 2024-03-12 | Stop reason: HOSPADM

## 2024-03-12 RX ORDER — SODIUM CHLORIDE 0.9 % (FLUSH) 0.9 %
5-40 SYRINGE (ML) INJECTION PRN
Status: DISCONTINUED | OUTPATIENT
Start: 2024-03-12 | End: 2024-03-12 | Stop reason: HOSPADM

## 2024-03-12 RX ADMIN — PROPOFOL 150 MG: 10 INJECTION, EMULSION INTRAVENOUS at 14:13

## 2024-03-12 RX ADMIN — PROPOFOL 50 MG: 10 INJECTION, EMULSION INTRAVENOUS at 14:20

## 2024-03-12 RX ADMIN — PROPOFOL 75 MG: 10 INJECTION, EMULSION INTRAVENOUS at 14:16

## 2024-03-12 RX ADMIN — SODIUM CHLORIDE: 9 INJECTION, SOLUTION INTRAVENOUS at 14:11

## 2024-03-12 RX ADMIN — PROPOFOL 50 MG: 10 INJECTION, EMULSION INTRAVENOUS at 14:14

## 2024-03-12 RX ADMIN — PROPOFOL 50 MG: 10 INJECTION, EMULSION INTRAVENOUS at 14:22

## 2024-03-12 RX ADMIN — PROPOFOL 100 MG: 10 INJECTION, EMULSION INTRAVENOUS at 14:18

## 2024-03-12 RX ADMIN — LIDOCAINE HYDROCHLORIDE 100 MG: 20 INJECTION, SOLUTION EPIDURAL; INFILTRATION; INTRACAUDAL; PERINEURAL at 14:13

## 2024-03-12 ASSESSMENT — PAIN - FUNCTIONAL ASSESSMENT: PAIN_FUNCTIONAL_ASSESSMENT: 0-10

## 2024-03-12 NOTE — PROGRESS NOTES

## 2024-03-12 NOTE — ANESTHESIA PRE PROCEDURE
12:26 PM    GLUCOSE 96 02/20/2024 12:26 PM    PROT 7.4 02/20/2024 12:26 PM    CALCIUM 8.9 02/20/2024 12:26 PM    BILITOT 0.4 02/20/2024 12:26 PM    ALKPHOS 86 02/20/2024 12:26 PM    ALKPHOS 116 03/29/2022 04:02 PM    AST 8 02/20/2024 12:26 PM    ALT 15 02/20/2024 12:26 PM       POC Tests: No results for input(s): \"POCGLU\", \"POCNA\", \"POCK\", \"POCCL\", \"POCBUN\", \"POCHEMO\", \"POCHCT\" in the last 72 hours.    Coags: No results found for: \"PROTIME\", \"INR\", \"APTT\"    HCG (If Applicable):   Lab Results   Component Value Date    PREGTESTUR Negative 02/20/2024        ABGs: No results found for: \"PHART\", \"PO2ART\", \"NWT3XFY\", \"PJK3XEY\", \"BEART\", \"A1GFUBPK\"     Type & Screen (If Applicable):  No results found for: \"LABABO\", \"LABRH\"    Drug/Infectious Status (If Applicable):  No results found for: \"HIV\", \"HEPCAB\"    COVID-19 Screening (If Applicable):   Lab Results   Component Value Date/Time    COVID19 Not detected 03/16/2022 10:17 AM    COVID19 Not Detected 05/07/2021 12:00 AM           Anesthesia Evaluation    Airway: Mallampati: II          Dental:          Pulmonary: breath sounds clear to auscultation  (+)           asthma:                            Cardiovascular:            Rhythm: regular  Rate: normal                    Neuro/Psych:   (+) headaches:, psychiatric history:            GI/Hepatic/Renal:             Endo/Other:                     Abdominal:             Vascular:          Other Findings:       Anesthesia Plan      MAC     ASA 1                               Joe Figueroa MD   3/12/2024

## 2024-03-12 NOTE — H&P
the procedure.    Plan: Endoscopic procedure with sedation     Meek Hilton MD   3/12/2024  2:11 PM

## 2024-03-12 NOTE — ANESTHESIA POSTPROCEDURE EVALUATION
Department of Anesthesiology  Postprocedure Note    Patient: Gisselle Herr  MRN: [id number]  YOB: 1990  Date of evaluation: 3/12/2024    Procedure Summary       Date: 03/12/24 Room / Location: [facility] 04 / [facility] ENDOSCOPY    Anesthesia Start: 1411 Anesthesia Stop: 1428    Procedure: ESOPHAGOGASTRODUODENOSCOPY (Upper GI Region) Diagnosis:       Abdominal pain, unspecified abdominal location      (Abdominal pain, unspecified abdominal location [R10.9])    Surgeons: Meek Hilton MD Responsible Provider: Joe Figueroa MD    Anesthesia Type: MAC ASA Status: 1            Anesthesia Type: MAC    Chaya Phase I: Chaya Score: 9    Chaya Phase II: Chaya Score: 10    Anesthesia Post Evaluation    Patient location during evaluation: bedside  Patient participation: complete - patient participated  Level of consciousness: awake  Airway patency: patent  Nausea & Vomiting: no nausea  Cardiovascular status: blood pressure returned to baseline  Respiratory status: acceptable  Hydration status: euvolemic  Pain management: adequate    No notable events documented.

## 2024-03-12 NOTE — OP NOTE
[place] [address]                 NAME:  Gisselle Herr   :   1990   MRN:   [id number]     Date/Time:  3/12/2024 2:29 PM    Esophagogastroduodenoscopy (EGD) Procedure Note    :  Meek Hilton MD    Staff: Circulator: Piyush Malin RN  Endoscopy Technician: Milind Ye     Referring Provider:  Emerson Khan APRN - [place]    Anethesia/Sedation:  MAC anesthesia Propofol    Preoperative diagnosis: Abdominal pain, unspecified abdominal location [R10.9]      Procedure Details     After infom consent was obtained for the procedure, with all risks and benefits of procedure explained the patient was taken to the endoscopy suite and placed in the left lateral decubitus position.  Following sequential administration of sedation as per above, the [id number] gastroscope was inserted into the mouth and advanced under direct vision to second portion of the duodenum.  A careful inspection was made as the gastroscope was withdrawn, including a retroflexed view of the proximal stomach; findings and interventions are described below.      Findings:  Esophagus:normal  Stomach:Patchy antral erythema, biopsies done  Duodenum/jejunum:normal      Therapies:  none    Specimens:  ID Type Source Tests Collected by Time Destination   1 : gastric antrum Tissue Gastric SURGICAL PATHOLOGY Meek Hilton MD 3/12/2024 1423               EBL: None    Complications:   None; patient tolerated the procedure well.           Impression:    See Postoperative diagnosis above    Recommendations:  -Acid suppression with a proton pump inhibitor., -Await pathology.    Discharge disposition:  Home in the company of  when able to ambulate    Meek Hilton MD

## 2024-03-25 ENCOUNTER — OFFICE VISIT (OUTPATIENT)
Dept: PRIMARY CARE CLINIC | Facility: CLINIC | Age: 34
End: 2024-03-25
Payer: COMMERCIAL

## 2024-03-25 VITALS
HEIGHT: 69 IN | WEIGHT: 162 LBS | TEMPERATURE: 97.8 F | SYSTOLIC BLOOD PRESSURE: 112 MMHG | RESPIRATION RATE: 16 BRPM | HEART RATE: 83 BPM | DIASTOLIC BLOOD PRESSURE: 76 MMHG | OXYGEN SATURATION: 99 % | BODY MASS INDEX: 23.99 KG/M2

## 2024-03-25 DIAGNOSIS — R50.81 FEVER IN OTHER DISEASES: Primary | ICD-10-CM

## 2024-03-25 DIAGNOSIS — R16.1 SPLEEN ENLARGED: ICD-10-CM

## 2024-03-25 DIAGNOSIS — R53.82 CHRONIC FATIGUE: ICD-10-CM

## 2024-03-25 DIAGNOSIS — E55.9 VITAMIN D DEFICIENCY: ICD-10-CM

## 2024-03-25 PROCEDURE — 99213 OFFICE O/P EST LOW 20 MIN: CPT | Performed by: NURSE PRACTITIONER

## 2024-03-25 SDOH — ECONOMIC STABILITY: FOOD INSECURITY: WITHIN THE PAST 12 MONTHS, YOU WORRIED THAT YOUR FOOD WOULD RUN OUT BEFORE YOU GOT MONEY TO BUY MORE.: PATIENT DECLINED

## 2024-03-25 SDOH — ECONOMIC STABILITY: HOUSING INSECURITY
IN THE LAST 12 MONTHS, WAS THERE A TIME WHEN YOU DID NOT HAVE A STEADY PLACE TO SLEEP OR SLEPT IN A SHELTER (INCLUDING NOW)?: PATIENT DECLINED

## 2024-03-25 SDOH — ECONOMIC STABILITY: INCOME INSECURITY: HOW HARD IS IT FOR YOU TO PAY FOR THE VERY BASICS LIKE FOOD, HOUSING, MEDICAL CARE, AND HEATING?: PATIENT DECLINED

## 2024-03-25 SDOH — ECONOMIC STABILITY: TRANSPORTATION INSECURITY
IN THE PAST 12 MONTHS, HAS LACK OF TRANSPORTATION KEPT YOU FROM MEETINGS, WORK, OR FROM GETTING THINGS NEEDED FOR DAILY LIVING?: PATIENT DECLINED

## 2024-03-25 SDOH — ECONOMIC STABILITY: FOOD INSECURITY: WITHIN THE PAST 12 MONTHS, THE FOOD YOU BOUGHT JUST DIDN'T LAST AND YOU DIDN'T HAVE MONEY TO GET MORE.: PATIENT DECLINED

## 2024-03-25 ASSESSMENT — PATIENT HEALTH QUESTIONNAIRE - PHQ9
1. LITTLE INTEREST OR PLEASURE IN DOING THINGS: NOT AT ALL
SUM OF ALL RESPONSES TO PHQ QUESTIONS 1-9: 0
2. FEELING DOWN, DEPRESSED OR HOPELESS: NOT AT ALL
SUM OF ALL RESPONSES TO PHQ QUESTIONS 1-9: 0
SUM OF ALL RESPONSES TO PHQ9 QUESTIONS 1 & 2: 0
SUM OF ALL RESPONSES TO PHQ QUESTIONS 1-9: 0
SUM OF ALL RESPONSES TO PHQ QUESTIONS 1-9: 0

## 2024-03-25 ASSESSMENT — ENCOUNTER SYMPTOMS
RESPIRATORY NEGATIVE: 1
GASTROINTESTINAL NEGATIVE: 1

## 2024-03-25 NOTE — PROGRESS NOTES
Gisselle Herr is a 34 y.o. female who was seen in clinic today (3/25/2024).    Assessment & Plan:   Below is the assessment and plan developed based on review of pertinent history, physical exam, labs, studies, and medications.    1. Fever in other diseases  Comments:  intermittently primarily at night.  will check labs  Orders:  -     MONO W/REFLEX TO EBV  -     LYME DISEASE AB, IGG/IGM, SERUM  2. Chronic fatigue  Comments:  checking labs for cause  Orders:  -     CBC with Auto Differential  -     MONO W/REFLEX TO EBV  -     Vitamin B12  -     Vitamin D 25 Hydroxy  -     TSH + Free T4 Panel  -     LYME DISEASE AB, IGG/IGM, SERUM  3. Spleen enlarged  Comments:  will check mono given symptoms and enlarged spleen.  Orders:  -     CBC with Auto Differential  -     MONO W/REFLEX TO EBV      No follow-ups on file.    Subjective:   Gisselle was seen today for Fatigue (Night sweats and fatigue for about 3 weeks)     Patient reported that she is having fatigue (months) and night sweats (3 weeks). She also gets intermittent fevers in the evening specifically then goes to bed and feels better the next day. Feels that she looks pale and that her iron may be low.   Patient reported that she feels like she could go to sleep all day long.       Patient reported she was recently dx with a stomach ulcer, follows with GI Dr. Hilton.  Also found to have enlarged spleen on imaging.    Patient has known low platelets, has an appointment with hematology in June.     Review of Systems   Constitutional:  Positive for diaphoresis, fatigue and fever. Negative for activity change, appetite change and unexpected weight change.   HENT: Negative.     Respiratory: Negative.     Cardiovascular: Negative.    Gastrointestinal: Negative.    Genitourinary: Negative.    Neurological: Negative.           Objective:     Vitals:    03/25/24 1042   BP: 112/76   Site: Right Upper Arm   Pulse: 83   Resp: 16   Temp: 97.8 °F (36.6 °C)   TempSrc: Oral

## 2024-03-25 NOTE — PROGRESS NOTES
Chief Complaint   Patient presents with    Fatigue     Night sweats and fatigue for about 3 weeks   /76 (Site: Right Upper Arm)   Pulse 83   Temp 97.8 °F (36.6 °C) (Oral)   Resp 16   Ht 1.753 m (5' 9\")   Wt 73.5 kg (162 lb)   SpO2 99%   BMI 23.92 kg/m²   \"Have you been to the ER, urgent care clinic since your last visit?  Hospitalized since your last visit?\"    YES - When: approximately 1  weeks ago.  Where and Why: [facility] for abdominal pain.    “Have you seen or consulted any other health care providers outside of [facility] since your last visit?”    NO     “Have you had a pap smear?”    YES - Where: ob/gyn Nurse/CMA to request most recent records if not in the chart                 Click Here for Release of Records Request

## 2024-03-26 LAB
25(OH)D3+25(OH)D2 SERPL-MCNC: 24.7 NG/ML (ref 30–100)
B BURGDOR IGG+IGM SER QL IA: NEGATIVE
BASOPHILS # BLD AUTO: 0 X10E3/UL (ref 0–0.2)
BASOPHILS NFR BLD AUTO: 1 %
EOSINOPHIL # BLD AUTO: 0.1 X10E3/UL (ref 0–0.4)
EOSINOPHIL NFR BLD AUTO: 2 %
ERYTHROCYTE [DISTWIDTH] IN BLOOD BY AUTOMATED COUNT: 13.1 % (ref 11.7–15.4)
HCT VFR BLD AUTO: 39.9 % (ref 34–46.6)
HGB BLD-MCNC: 13.2 G/DL (ref 11.1–15.9)
IMM GRANULOCYTES # BLD AUTO: 0 X10E3/UL (ref 0–0.1)
IMM GRANULOCYTES NFR BLD AUTO: 0 %
LYMPHOCYTES # BLD AUTO: 1.3 X10E3/UL (ref 0.7–3.1)
LYMPHOCYTES NFR BLD AUTO: 24 %
MCH RBC QN AUTO: 31.6 PG (ref 26.6–33)
MCHC RBC AUTO-ENTMCNC: 33.1 G/DL (ref 31.5–35.7)
MCV RBC AUTO: 96 FL (ref 79–97)
MONOCYTES # BLD AUTO: 0.4 X10E3/UL (ref 0.1–0.9)
MONOCYTES NFR BLD AUTO: 8 %
NEUTROPHILS # BLD AUTO: 3.5 X10E3/UL (ref 1.4–7)
NEUTROPHILS NFR BLD AUTO: 65 %
PLATELET # BLD AUTO: 144 X10E3/UL (ref 150–450)
RBC # BLD AUTO: 4.18 X10E6/UL (ref 3.77–5.28)
T4 FREE SERPL-MCNC: 1.04 NG/DL (ref 0.82–1.77)
TSH SERPL DL<=0.005 MIU/L-ACNC: 1.02 UIU/ML (ref 0.45–4.5)
VIT B12 SERPL-MCNC: 371 PG/ML (ref 232–1245)
WBC # BLD AUTO: 5.3 X10E3/UL (ref 3.4–10.8)

## 2024-03-27 LAB — HETEROPH AB SER QL LA: NEGATIVE

## 2024-03-27 RX ORDER — ERGOCALCIFEROL 1.25 MG/1
50000 CAPSULE ORAL WEEKLY
Qty: 8 CAPSULE | Refills: 0 | Status: SHIPPED | OUTPATIENT
Start: 2024-03-27 | End: 2024-05-22

## 2024-04-22 DIAGNOSIS — E55.9 VITAMIN D DEFICIENCY: ICD-10-CM

## 2024-04-22 RX ORDER — ERGOCALCIFEROL 1.25 MG/1
50000 CAPSULE ORAL WEEKLY
Qty: 12 CAPSULE | Refills: 1 | OUTPATIENT
Start: 2024-04-22

## 2024-04-24 ENCOUNTER — APPOINTMENT (OUTPATIENT)
Facility: HOSPITAL | Age: 34
End: 2024-04-24
Payer: COMMERCIAL

## 2024-04-24 ENCOUNTER — HOSPITAL ENCOUNTER (EMERGENCY)
Facility: HOSPITAL | Age: 34
Discharge: HOME | End: 2024-04-24
Attending: EMERGENCY MEDICINE
Payer: COMMERCIAL

## 2024-04-24 VITALS
WEIGHT: 160 LBS | HEIGHT: 69 IN | OXYGEN SATURATION: 100 % | TEMPERATURE: 97.9 F | HEART RATE: 77 BPM | SYSTOLIC BLOOD PRESSURE: 133 MMHG | DIASTOLIC BLOOD PRESSURE: 89 MMHG | BODY MASS INDEX: 23.7 KG/M2 | RESPIRATION RATE: 16 BRPM

## 2024-04-24 DIAGNOSIS — M25.562 POSTERIOR LEFT KNEE PAIN: Primary | ICD-10-CM

## 2024-04-24 LAB — ECHO BSA: 1.88 M2

## 2024-04-24 PROCEDURE — 99284 EMERGENCY DEPT VISIT MOD MDM: CPT

## 2024-04-24 PROCEDURE — 93971 EXTREMITY STUDY: CPT

## 2024-04-24 ASSESSMENT — PAIN SCALES - GENERAL: PAINLEVEL_OUTOF10: 4

## 2024-04-24 ASSESSMENT — PAIN - FUNCTIONAL ASSESSMENT: PAIN_FUNCTIONAL_ASSESSMENT: 0-10

## 2024-04-24 NOTE — ED PROVIDER NOTES
the absence of a cardiologist.        RADIOLOGY:   Non-plain film images such as CT, Ultrasound and MRI are read by the radiologist. Plain radiographic images are visualized and preliminarily interpreted by the emergency physician with the below findings:        Interpretation per the Radiologist below, if available at the time of this note:    Vascular duplex lower extremity venous left   Final Result           LABS:  Labs Reviewed - No data to display    All other labs were within normal range or not returned as of this dictation.    EMERGENCY DEPARTMENT COURSE and DIFFERENTIAL DIAGNOSIS/MDM:   Vitals:    Vitals:    04/24/24 1022   BP: 133/89   Pulse: 77   Resp: 16   Temp: 97.9 °F (36.6 °C)   TempSrc: Oral   SpO2: 100%   Weight: 72.6 kg (160 lb)   Height: 1.753 m (5' 9\")           Medical Decision Making  Differential DX: DVT vs bakers cyst vs knee fracture vs knee strain vs cellulitis vs septic joint vs gout     1. Previous notes (external to Emergency room) were reviewed: Chart Review    2.History was obtained by: Patient    3. Chronic medical issues affecting this visit: none    4. I ordered the following tests, followed by review of final reads by lab and radiology: kimber OLSON    5. I considered ordering: xray left knee-shared decision making, patient denying the knee as she states the bones are not painful and denies any fall or injury    6. Medical intervention: Patient was offered Tylenol or ibuprofen for the pain, patient refusing the need at this time.      Surgical intervention: None Indicated    7. Social determinants of health: None    8 Final diagnosis: posterior left knee pain . Medications prescribed: arnica gel, tylenol or ibuprofen as needed.    9. Disposition: Discharge to home and follow up [facility] and [facility], return to the emergency room with worsening symptoms. Patient in agreement with plan of care.              REASSESSMENT     ED Course as of 04/24/24 1132   Wed Apr 24, 2024   1124

## 2024-04-24 NOTE — ED TRIAGE NOTES
Patient presents for left leg pain with knot and bruising behind the knee. Denies injury or known cause. Denies history of blood clots and birth control. Recent diagnosis of retinopathy and unable to take steroids at this time due to condition.

## 2024-04-24 NOTE — DISCHARGE INSTRUCTIONS
Arnica Gel made by [employer]- apply 2-3 times daily to help break up contusions and to help with the discomfort. You may NOT use Arnica Gel if you are currently taking any oral blood thinners.     You may apply ice as needed for 20 minutes every 2 hours for the next 24 to 48 hours, you may use a compression sleeve or Ace wrap, but do not sleep with these items in place, elevate your lower extremity when sitting, follow-up with your primary care provider for further workup and evaluation in [facility] for continued discomfort.  Return to the ER with any worsening symptoms. You may also take Tylenol or Ibuprofen as needed for pain.

## 2024-05-30 ENCOUNTER — TELEMEDICINE (OUTPATIENT)
Dept: PRIMARY CARE CLINIC | Facility: CLINIC | Age: 34
End: 2024-05-30
Payer: COMMERCIAL

## 2024-05-30 DIAGNOSIS — J01.00 ACUTE NON-RECURRENT MAXILLARY SINUSITIS: Primary | ICD-10-CM

## 2024-05-30 PROCEDURE — 99213 OFFICE O/P EST LOW 20 MIN: CPT | Performed by: NURSE PRACTITIONER

## 2024-05-30 RX ORDER — AMOXICILLIN AND CLAVULANATE POTASSIUM 875; 125 MG/1; MG/1
1 TABLET, FILM COATED ORAL 2 TIMES DAILY
Qty: 14 TABLET | Refills: 0 | Status: SHIPPED | OUTPATIENT
Start: 2024-05-30 | End: 2024-06-06

## 2024-05-30 ASSESSMENT — PATIENT HEALTH QUESTIONNAIRE - PHQ9
1. LITTLE INTEREST OR PLEASURE IN DOING THINGS: NOT AT ALL
SUM OF ALL RESPONSES TO PHQ QUESTIONS 1-9: 0
SUM OF ALL RESPONSES TO PHQ9 QUESTIONS 1 & 2: 0
SUM OF ALL RESPONSES TO PHQ QUESTIONS 1-9: 0
2. FEELING DOWN, DEPRESSED OR HOPELESS: NOT AT ALL

## 2024-05-30 ASSESSMENT — ENCOUNTER SYMPTOMS
COUGH: 1
WHEEZING: 1

## 2024-05-30 NOTE — PROGRESS NOTES
Identified pt with two pt identifiers(name and ). Reviewed record in preparation for visit and have obtained necessary documentation. All patient medications has been reviewed.  Chief Complaint   Patient presents with    Sinus Problem       There were no vitals filed for this visit.                Coordination of Care Questionnaire:   1) Have you been to an emergency room, urgent care, or hospitalized since your last visit?   no       2. Have seen or consulted any other health care provider since your last visit? no        Patient is accompanied by self I have received verbal consent from Gisselle Herr to discuss any/all medical information while they are present in the room.

## 2024-05-30 NOTE — PROGRESS NOTES
Gisselle Herr is a 34 y.o. female who was seen via telemedicine today 5/30/2024).    Assessment & Plan:   Below is the assessment and plan developed based on review of pertinent history, physical exam, labs, studies, and medications.    1. Acute non-recurrent maxillary sinusitis  Comments:  given duration of symptoms and that they are worsening will start on augment.  Continue nebs/tylenol.  Stay well hydrated.  Orders:  -     amoxicillin-clavulanate (AUGMENTIN) 875-125 MG per tablet; Take 1 tablet by mouth 2 times daily for 7 days, Disp-14 tablet, R-0Normal      No follow-ups on file.    Subjective:   Gisselle was seen today for Sinus Problem     Patient reported that she is having nasal congestion and cough with productive green sputum x10 days.  Feels that symptoms are worsening.    Patient has taken tylenol and nebulizer with mild symptoms relief.     Patient reported that she is having an issue with her retina, told to avoid any steroidal medications.      Review of Systems   HENT:  Positive for congestion.    Respiratory:  Positive for cough and wheezing.           Objective:     There were no vitals filed for this visit.   There is no height or weight on file to calculate BMI.     Physical Exam  Constitutional:       Appearance: Normal appearance.   HENT:      Head: Normocephalic.   Eyes:      Conjunctiva/sclera: Conjunctivae normal.   Pulmonary:      Effort: Pulmonary effort is normal.   Skin:     Coloration: Skin is not pale.   Neurological:      Mental Status: She is alert and oriented to person, place, and time.   Psychiatric:         Mood and Affect: Mood normal.         Behavior: Behavior normal.          Allergies   Allergen Reactions    Butorphanol Hives    Onion Itching     Red Onions, Makes throat tingly    Vancomycin Itching     Itching and facial redness       Current Outpatient Medications   Medication Sig Dispense Refill    amoxicillin-clavulanate (AUGMENTIN) 875-125 MG per tablet Take 1

## 2024-06-06 ENCOUNTER — OFFICE VISIT (OUTPATIENT)
Age: 34
End: 2024-06-06
Payer: COMMERCIAL

## 2024-06-06 VITALS
BODY MASS INDEX: 24.14 KG/M2 | DIASTOLIC BLOOD PRESSURE: 81 MMHG | SYSTOLIC BLOOD PRESSURE: 120 MMHG | HEIGHT: 69 IN | RESPIRATION RATE: 18 BRPM | HEART RATE: 78 BPM | OXYGEN SATURATION: 98 % | WEIGHT: 163 LBS | TEMPERATURE: 97.9 F

## 2024-06-06 DIAGNOSIS — R53.83 OTHER FATIGUE: ICD-10-CM

## 2024-06-06 DIAGNOSIS — E53.8 LOW SERUM VITAMIN B12: ICD-10-CM

## 2024-06-06 DIAGNOSIS — F41.1 GENERALIZED ANXIETY DISORDER: ICD-10-CM

## 2024-06-06 DIAGNOSIS — D69.6 THROMBOCYTOPENIA (HCC): ICD-10-CM

## 2024-06-06 DIAGNOSIS — D69.6 THROMBOCYTOPENIA (HCC): Primary | ICD-10-CM

## 2024-06-06 DIAGNOSIS — R19.7 DIARRHEA, UNSPECIFIED TYPE: ICD-10-CM

## 2024-06-06 DIAGNOSIS — R61 NIGHT SWEATS: ICD-10-CM

## 2024-06-06 PROCEDURE — 99203 OFFICE O/P NEW LOW 30 MIN: CPT | Performed by: NURSE PRACTITIONER

## 2024-06-06 RX ORDER — PANTOPRAZOLE SODIUM 20 MG/1
20 TABLET, DELAYED RELEASE ORAL DAILY
COMMUNITY

## 2024-06-06 ASSESSMENT — PATIENT HEALTH QUESTIONNAIRE - PHQ9
SUM OF ALL RESPONSES TO PHQ QUESTIONS 1-9: 0
SUM OF ALL RESPONSES TO PHQ9 QUESTIONS 1 & 2: 0
1. LITTLE INTEREST OR PLEASURE IN DOING THINGS: NOT AT ALL
2. FEELING DOWN, DEPRESSED OR HOPELESS: NOT AT ALL
SUM OF ALL RESPONSES TO PHQ QUESTIONS 1-9: 0

## 2024-06-06 NOTE — PROGRESS NOTES
Chief Complaint   Patient presents with    New Patient           Vitals:    06/06/24 1138   BP: 120/81   Pulse: 78   Resp: 18   Temp: 97.9 °F (36.6 °C)   SpO2: 98%            1. Have you been to the ER, urgent care clinic since your last visit?  Hospitalized since your last visit?  New Patient  2. Have you seen or consulted any other health care providers outside of the [facility] since your last visit?  Include any pap smears or colon screening. New Patient      
Gallbladder is within normal limits. CBD is not dilated.  SPLEEN: Splenomegaly measures 14.5 cm in craniocaudal dimension, no significant change.  PANCREAS: No mass. Edema is between the pancreatic body and the lesser curvature of the stomach. No drainable fluid collection.  ADRENALS: Unremarkable.  KIDNEYS: No mass, calculus, or hydronephrosis.  STOMACH: Incomplete distention, limited evaluation.  SMALL BOWEL: No dilatation or wall thickening.  COLON: No dilatation or wall thickening.  APPENDIX: Normal. Retrocecal location.  PERITONEUM: No ascites or pneumoperitoneum.  RETROPERITONEUM: No lymphadenopathy or aortic aneurysm.  REPRODUCTIVE ORGANS: Uterus is within normal limits.  URINARY BLADDER: Incomplete distention, limited evaluation.  BONES: No destructive bone lesion.  ABDOMINAL WALL: No mass or hernia.  IMPRESSION:  1. Inflammation between the pancreas and the stomach. Differential diagnosis is pancreatitis versus gastritis/peptic ulcer disease. No drainable fluid collection.  2. Unchanged splenomegaly.    Assessment & Plan:   Gisselle Herr is a 34 y.o. female here for evaluation of thrombocytopenia:     1. Thrombocytopenia / Low serum B12:   In this patient the most likely cause of her thrombocytopenia is splenomegaly due to recurrent viral infections. Spleen is only mildly enlarged with no LUQ tenderness. Reviewed counts and PLT count was normal 5/2023, but declined to the 140 range, with most recent platelet count 144 on 3/25/24. Reports her platelets have been chronically low since childhood and the normal platelet count in May 2023 was during pregnancy. No recent medication changes or viral infections. Discussed she may have a vitamin deficiency, other viral illness, autoimmune disorder or blood disorder such as MDS. Recent vitamin B12 lower end of normal. H. Pylori negative on 2024 EGD biopsy.   -- Check folate, Hep B/C profiles, HIV, MARIAM, CBC with peripheral smear, APTT, INR, fibrinogen - call

## 2024-06-07 LAB
ALBUMIN SERPL-MCNC: 3.8 G/DL (ref 3.5–5)
ALBUMIN/GLOB SERPL: 1.2 (ref 1.1–2.2)
ALP SERPL-CCNC: 94 U/L (ref 45–117)
ALT SERPL-CCNC: 24 U/L (ref 12–78)
ANION GAP SERPL CALC-SCNC: 7 MMOL/L (ref 5–15)
APTT PPP: 26.6 SEC (ref 22.1–31)
AST SERPL-CCNC: 11 U/L (ref 15–37)
BASOPHILS # BLD: 0.1 K/UL (ref 0–0.1)
BASOPHILS NFR BLD: 1 % (ref 0–1)
BILIRUB SERPL-MCNC: 0.3 MG/DL (ref 0.2–1)
BUN SERPL-MCNC: 12 MG/DL (ref 6–20)
BUN/CREAT SERPL: 14 (ref 12–20)
CALCIUM SERPL-MCNC: 8.9 MG/DL (ref 8.5–10.1)
CHLORIDE SERPL-SCNC: 108 MMOL/L (ref 97–108)
CO2 SERPL-SCNC: 24 MMOL/L (ref 21–32)
CREAT SERPL-MCNC: 0.83 MG/DL (ref 0.55–1.02)
DIFFERENTIAL METHOD BLD: ABNORMAL
EOSINOPHIL # BLD: 0.2 K/UL (ref 0–0.4)
EOSINOPHIL NFR BLD: 3 % (ref 0–7)
ERYTHROCYTE [DISTWIDTH] IN BLOOD BY AUTOMATED COUNT: 13.3 % (ref 11.5–14.5)
FIBRINOGEN PPP-MCNC: 268 MG/DL (ref 200–475)
FOLATE SERPL-MCNC: 8 NG/ML (ref 5–21)
GLOBULIN SER CALC-MCNC: 3.1 G/DL (ref 2–4)
GLUCOSE SERPL-MCNC: 106 MG/DL (ref 65–100)
HBV CORE AB SERPL QL IA: NEGATIVE
HBV SURFACE AB SER QL: REACTIVE
HBV SURFACE AB SER-ACNC: 162.09 MIU/ML
HBV SURFACE AG SER QL: <0.1 INDEX
HBV SURFACE AG SER QL: NEGATIVE
HCT VFR BLD AUTO: 36.9 % (ref 35–47)
HCV AB SER IA-ACNC: <0.02 INDEX
HCV AB SERPL QL IA: NONREACTIVE
HGB BLD-MCNC: 12.3 G/DL (ref 11.5–16)
HIV 1+2 AB+HIV1 P24 AG SERPL QL IA: NONREACTIVE
HIV 1/2 RESULT COMMENT: NORMAL
IMM GRANULOCYTES # BLD AUTO: 0 K/UL (ref 0–0.04)
IMM GRANULOCYTES NFR BLD AUTO: 0 % (ref 0–0.5)
INR PPP: 1 (ref 0.9–1.1)
LYMPHOCYTES # BLD: 1.8 K/UL (ref 0.8–3.5)
LYMPHOCYTES NFR BLD: 31 % (ref 12–49)
MCH RBC QN AUTO: 32.5 PG (ref 26–34)
MCHC RBC AUTO-ENTMCNC: 33.3 G/DL (ref 30–36.5)
MCV RBC AUTO: 97.4 FL (ref 80–99)
MONOCYTES # BLD: 0.4 K/UL (ref 0–1)
MONOCYTES NFR BLD: 7 % (ref 5–13)
NEUTS SEG # BLD: 3.4 K/UL (ref 1.8–8)
NEUTS SEG NFR BLD: 58 % (ref 32–75)
NRBC # BLD: 0 K/UL (ref 0–0.01)
NRBC BLD-RTO: 0 PER 100 WBC
PERIPHERAL SMEAR, MD REVIEW: NORMAL
PLATELET # BLD AUTO: 165 K/UL (ref 150–400)
PMV BLD AUTO: 10.6 FL (ref 8.9–12.9)
POTASSIUM SERPL-SCNC: 3.8 MMOL/L (ref 3.5–5.1)
PROT SERPL-MCNC: 6.9 G/DL (ref 6.4–8.2)
PROTHROMBIN TIME: 10.5 SEC (ref 9–11.1)
RBC # BLD AUTO: 3.79 M/UL (ref 3.8–5.2)
SODIUM SERPL-SCNC: 139 MMOL/L (ref 136–145)
THERAPEUTIC RANGE: NORMAL SECS (ref 58–77)
WBC # BLD AUTO: 5.8 K/UL (ref 3.6–11)

## 2024-06-10 LAB — ANA SER QL: NEGATIVE

## 2024-08-04 DIAGNOSIS — E55.9 VITAMIN D DEFICIENCY: ICD-10-CM

## 2024-08-04 DIAGNOSIS — F41.1 GENERALIZED ANXIETY DISORDER: ICD-10-CM

## 2024-08-05 RX ORDER — ERGOCALCIFEROL 1.25 MG/1
50000 CAPSULE ORAL WEEKLY
Qty: 4 CAPSULE | Refills: 1 | Status: SHIPPED | OUTPATIENT
Start: 2024-08-05

## 2024-08-13 ENCOUNTER — OFFICE VISIT (OUTPATIENT)
Dept: PRIMARY CARE CLINIC | Facility: CLINIC | Age: 34
End: 2024-08-13
Payer: COMMERCIAL

## 2024-08-13 VITALS
SYSTOLIC BLOOD PRESSURE: 110 MMHG | HEART RATE: 68 BPM | WEIGHT: 164 LBS | RESPIRATION RATE: 16 BRPM | DIASTOLIC BLOOD PRESSURE: 69 MMHG | OXYGEN SATURATION: 98 % | TEMPERATURE: 98.3 F | HEIGHT: 69 IN | BODY MASS INDEX: 24.29 KG/M2

## 2024-08-13 DIAGNOSIS — J02.9 SORE THROAT: Primary | ICD-10-CM

## 2024-08-13 DIAGNOSIS — T14.8XXA HEMATOMA: ICD-10-CM

## 2024-08-13 LAB
EXP DATE SOLUTION: NORMAL
EXP DATE SWAB: NORMAL
EXPIRATION DATE: NORMAL
LOT NUMBER POC: NORMAL
LOT NUMBER SOLUTION: NORMAL
LOT NUMBER SWAB: NORMAL
SARS-COV-2 RNA, POC: NEGATIVE
STREP PYOGENES DNA, POC: NEGATIVE
VALID INTERNAL CONTROL, POC: YES

## 2024-08-13 PROCEDURE — 87651 STREP A DNA AMP PROBE: CPT | Performed by: NURSE PRACTITIONER

## 2024-08-13 PROCEDURE — 99213 OFFICE O/P EST LOW 20 MIN: CPT | Performed by: NURSE PRACTITIONER

## 2024-08-13 PROCEDURE — 87635 SARS-COV-2 COVID-19 AMP PRB: CPT | Performed by: NURSE PRACTITIONER

## 2024-08-13 SDOH — ECONOMIC STABILITY: INCOME INSECURITY: HOW HARD IS IT FOR YOU TO PAY FOR THE VERY BASICS LIKE FOOD, HOUSING, MEDICAL CARE, AND HEATING?: NOT HARD AT ALL

## 2024-08-13 SDOH — ECONOMIC STABILITY: FOOD INSECURITY: WITHIN THE PAST 12 MONTHS, YOU WORRIED THAT YOUR FOOD WOULD RUN OUT BEFORE YOU GOT MONEY TO BUY MORE.: NEVER TRUE

## 2024-08-13 SDOH — ECONOMIC STABILITY: FOOD INSECURITY: WITHIN THE PAST 12 MONTHS, THE FOOD YOU BOUGHT JUST DIDN'T LAST AND YOU DIDN'T HAVE MONEY TO GET MORE.: NEVER TRUE

## 2024-08-13 ASSESSMENT — ENCOUNTER SYMPTOMS
SORE THROAT: 1
COLOR CHANGE: 1

## 2024-08-13 ASSESSMENT — PATIENT HEALTH QUESTIONNAIRE - PHQ9
2. FEELING DOWN, DEPRESSED OR HOPELESS: NOT AT ALL
1. LITTLE INTEREST OR PLEASURE IN DOING THINGS: NOT AT ALL
SUM OF ALL RESPONSES TO PHQ QUESTIONS 1-9: 0
SUM OF ALL RESPONSES TO PHQ QUESTIONS 1-9: 0
SUM OF ALL RESPONSES TO PHQ9 QUESTIONS 1 & 2: 0
SUM OF ALL RESPONSES TO PHQ QUESTIONS 1-9: 0
SUM OF ALL RESPONSES TO PHQ QUESTIONS 1-9: 0

## 2024-08-13 NOTE — PROGRESS NOTES
Gisselle Herr is a 34 y.o. female who was seen in clinic today (8/13/2024).    Assessment & Plan:   Below is the assessment and plan developed based on review of pertinent history, physical exam, labs, studies, and medications.    1. Sore throat  covid and strep negative. most likely viral, discuss symptom management and when to follow up INI   -     AMB POC COVID-19 COV  -     AMB POC STREP GO A DIRECT, DNA PROBE  2. Hematoma  Comments:  discussed expection of resolution.   She will continue to monitor.  Can use heat for symptom relief.      No follow-ups on file.    Subjective:   Gisselle was seen today for Bleeding/Bruising (Patient fell 2 weeks ago had hit left thigh and she still has a knot)     Patient reported she tripped over a child toy and hit her left thigh on the side of a crate.  Patient developed bruise to thigh which is improving however she is concerned by how hard the area is.   Patient reported area is painful. Has not tried any treatment.      Review of Systems   Constitutional:  Negative for fatigue and fever.   HENT:  Positive for postnasal drip and sore throat.    Musculoskeletal:  Positive for myalgias (left thigh).   Skin:  Positive for color change (left thigh).          Objective:     Vitals:    08/13/24 0955   BP: 110/69   Site: Right Upper Arm   Position: Sitting   Pulse: 68   Resp: 16   Temp: 98.3 °F (36.8 °C)   TempSrc: Oral   SpO2: 98%   Weight: 74.4 kg (164 lb)   Height: 1.753 m (5' 9\")      Body mass index is 24.22 kg/m².     Physical Exam  Constitutional:       Appearance: Normal appearance.   HENT:      Head: Normocephalic.      Nose: No congestion.      Mouth/Throat:      Pharynx: No oropharyngeal exudate or posterior oropharyngeal erythema.   Eyes:      Conjunctiva/sclera: Conjunctivae normal.   Pulmonary:      Effort: Pulmonary effort is normal.   Skin:     Coloration: Skin is not pale.      Findings: Ecchymosis (left thigh) present.   Neurological:      Mental Status:

## 2024-08-13 NOTE — PROGRESS NOTES
Chief Complaint   Patient presents with    Bleeding/Bruising     Patient fell 2 weeks ago had hit left thigh and she still has a knot     /69 (Site: Right Upper Arm, Position: Sitting)   Pulse 68   Temp 98.3 °F (36.8 °C) (Oral)   Resp 16   Ht 1.753 m (5' 9\")   Wt 74.4 kg (164 lb)   SpO2 98%   BMI 24.22 kg/m²     \"Have you been to the ER, urgent care clinic since your last visit?  Hospitalized since your last visit?\"    NO    “Have you seen or consulted any other health care providers outside of [facility] since your last visit?”    NO     “Have you had a pap smear?”    YES - Where: ob/gyn Nurse/CMA to request most recent records if not in the chart    No cervical cancer screening on file             Click Here for Release of Records Request

## 2024-08-29 DIAGNOSIS — E55.9 VITAMIN D DEFICIENCY: ICD-10-CM

## 2024-08-29 RX ORDER — ERGOCALCIFEROL 1.25 MG/1
50000 CAPSULE, LIQUID FILLED ORAL WEEKLY
Qty: 12 CAPSULE | Refills: 1 | Status: SHIPPED | OUTPATIENT
Start: 2024-08-29

## 2024-09-05 NOTE — TELEPHONE ENCOUNTER
Gisselle Herr is requesting a refill on albuterol sulfate HFA (PROVENTIL;VENTOLIN;PROAIR) 108 (90 Base) MCG/ACT inhaler  .   Please send medication to: [facility]/pharmacy #[id number] - [place] - [address] -  [phone] - F [phone] (Pharmacy)   Patient's last appointment was 8/13/2024   Next visit is scheduled for Visit date not found

## 2024-09-06 RX ORDER — ALBUTEROL SULFATE 90 UG/1
1 AEROSOL, METERED RESPIRATORY (INHALATION) EVERY 6 HOURS PRN
Qty: 8.5 EACH | Refills: 2 | Status: SHIPPED | OUTPATIENT
Start: 2024-09-06

## 2024-10-21 ENCOUNTER — TELEPHONE (OUTPATIENT)
Age: 34
End: 2024-10-21

## 2024-10-21 NOTE — TELEPHONE ENCOUNTER
Pt called in requesting to cancel her upcoming appt, Pt said she didn't have the labs done. Offered to reschedule the appt, pt request to call back. RAMY

## 2025-01-17 ENCOUNTER — HOSPITAL ENCOUNTER (EMERGENCY)
Facility: HOSPITAL | Age: 35
Discharge: HOME | End: 2025-01-17
Attending: EMERGENCY MEDICINE
Payer: COMMERCIAL

## 2025-01-17 VITALS
HEIGHT: 68 IN | DIASTOLIC BLOOD PRESSURE: 86 MMHG | TEMPERATURE: 98.8 F | OXYGEN SATURATION: 100 % | RESPIRATION RATE: 16 BRPM | HEART RATE: 89 BPM | BODY MASS INDEX: 25.76 KG/M2 | WEIGHT: 170 LBS | SYSTOLIC BLOOD PRESSURE: 116 MMHG

## 2025-01-17 DIAGNOSIS — B34.9 VIRAL ILLNESS: Primary | ICD-10-CM

## 2025-01-17 LAB
FLUAV RNA SPEC QL NAA+PROBE: NOT DETECTED
FLUBV RNA SPEC QL NAA+PROBE: NOT DETECTED
SARS-COV-2 RNA RESP QL NAA+PROBE: NOT DETECTED
SOURCE: NORMAL

## 2025-01-17 PROCEDURE — 99283 EMERGENCY DEPT VISIT LOW MDM: CPT

## 2025-01-17 PROCEDURE — 87636 SARSCOV2 & INF A&B AMP PRB: CPT

## 2025-01-17 ASSESSMENT — ENCOUNTER SYMPTOMS
WHEEZING: 0
SHORTNESS OF BREATH: 0
VOMITING: 0
COUGH: 1
ABDOMINAL PAIN: 0
NAUSEA: 0

## 2025-01-17 ASSESSMENT — PAIN - FUNCTIONAL ASSESSMENT: PAIN_FUNCTIONAL_ASSESSMENT: NONE - DENIES PAIN

## 2025-01-17 NOTE — ED TRIAGE NOTES
Ambulatory with complaints of cough, headache, body aches, sore throat and fever x3 days. Here with family members for same complaints.

## 2025-01-17 NOTE — ED PROVIDER NOTES
[facility] EMERGENCY DEPARTMENT  EMERGENCY DEPARTMENT ENCOUNTER      Pt Name: Gisselle Herr  MRN: [id number]  Birthdate 1990  Date of evaluation: 1/17/2025  Provider: Ursula Pickard MD    CHIEF COMPLAINT       Chief Complaint   Patient presents with    Generalized Body Aches    Cough         HISTORY OF PRESENT ILLNESS    HPI    Gisselle Herr is a 35 y.o. female with significant past medical history who presents to the emergency department with complaints of bodyaches with productive cough and fever x 2 to 3 days.  Other family members here with same.  Daughter tested positive for flu 3 days ago.  Denies nausea, vomiting, diarrhea, constipation.  Denies chest pain, shortness of breath.  Unvaccinated for COVID and flu.  Nursing Notes were reviewed.    REVIEW OF SYSTEMS       Review of Systems   Constitutional:  Positive for fever. Negative for chills.   Respiratory:  Positive for cough. Negative for shortness of breath and wheezing.    Cardiovascular:  Negative for chest pain.   Gastrointestinal:  Negative for abdominal pain, nausea and vomiting.   Genitourinary:  Negative for difficulty urinating and flank pain.   Musculoskeletal:  Positive for myalgias.   Skin:  Negative for wound.   Neurological:  Negative for headaches.   Psychiatric/Behavioral:  Negative for suicidal ideas.            PAST MEDICAL HISTORY     Past Medical History:   Diagnosis Date    Anxiety     Asthma     last needed inhaler two months ago.    Depression     Zoloft 50mg daily    Headache     Herpes simplex virus (HSV) infection          SURGICAL HISTORY       Past Surgical History:   Procedure Laterality Date    LEEP  2017    OTHER SURGICAL HISTORY      Tonsillectomy/adnoidectomy    TONSILLECTOMY      UPPER GASTROINTESTINAL ENDOSCOPY N/A 03/12/2024    ESOPHAGOGASTRODUODENOSCOPY performed by Meek Hilton MD at [facility] ENDOSCOPY         CURRENT MEDICATIONS       Previous Medications    ALBUTEROL SULFATE HFA

## 2025-02-12 DIAGNOSIS — D69.6 THROMBOCYTOPENIA (HCC): Primary | ICD-10-CM

## 2025-02-12 DIAGNOSIS — D69.6 THROMBOCYTOPENIA: ICD-10-CM

## 2025-04-15 LAB
BASOPHILS # BLD AUTO: 0 X10E3/UL (ref 0–0.2)
BASOPHILS NFR BLD AUTO: 1 %
EOSINOPHIL # BLD AUTO: 0.1 X10E3/UL (ref 0–0.4)
EOSINOPHIL NFR BLD AUTO: 2 %
ERYTHROCYTE [DISTWIDTH] IN BLOOD BY AUTOMATED COUNT: 13 % (ref 11.7–15.4)
HCT VFR BLD AUTO: 40.2 % (ref 34–46.6)
HGB BLD-MCNC: 12.8 G/DL (ref 11.1–15.9)
IMM GRANULOCYTES # BLD AUTO: 0 X10E3/UL (ref 0–0.1)
IMM GRANULOCYTES NFR BLD AUTO: 0 %
LYMPHOCYTES # BLD AUTO: 1.1 X10E3/UL (ref 0.7–3.1)
LYMPHOCYTES NFR BLD AUTO: 21 %
MCH RBC QN AUTO: 30.7 PG (ref 26.6–33)
MCHC RBC AUTO-ENTMCNC: 31.8 G/DL (ref 31.5–35.7)
MCV RBC AUTO: 96 FL (ref 79–97)
MONOCYTES # BLD AUTO: 0.5 X10E3/UL (ref 0.1–0.9)
MONOCYTES NFR BLD AUTO: 10 %
NEUTROPHILS # BLD AUTO: 3.6 X10E3/UL (ref 1.4–7)
NEUTROPHILS NFR BLD AUTO: 66 %
PLATELET # BLD AUTO: 177 X10E3/UL (ref 150–450)
RBC # BLD AUTO: 4.17 X10E6/UL (ref 3.77–5.28)
WBC # BLD AUTO: 5.4 X10E3/UL (ref 3.4–10.8)

## 2025-04-17 ENCOUNTER — TELEPHONE (OUTPATIENT)
Age: 35
End: 2025-04-17

## 2025-04-17 NOTE — TELEPHONE ENCOUNTER
Patient called and stated that she needed to cancel her appt due to her car not starting. She stated that she will call back to reschedule.

## 2025-07-05 ENCOUNTER — HOSPITAL ENCOUNTER (EMERGENCY)
Facility: HOSPITAL | Age: 35
Discharge: HOME | End: 2025-07-05
Attending: EMERGENCY MEDICINE
Payer: COMMERCIAL

## 2025-07-05 ENCOUNTER — APPOINTMENT (OUTPATIENT)
Facility: HOSPITAL | Age: 35
End: 2025-07-05
Payer: COMMERCIAL

## 2025-07-05 VITALS
HEIGHT: 68 IN | RESPIRATION RATE: 16 BRPM | TEMPERATURE: 98 F | WEIGHT: 181 LBS | SYSTOLIC BLOOD PRESSURE: 130 MMHG | BODY MASS INDEX: 27.43 KG/M2 | HEART RATE: 73 BPM | OXYGEN SATURATION: 100 % | DIASTOLIC BLOOD PRESSURE: 77 MMHG

## 2025-07-05 DIAGNOSIS — R42 LIGHTHEADEDNESS: ICD-10-CM

## 2025-07-05 DIAGNOSIS — R11.0 NAUSEA: ICD-10-CM

## 2025-07-05 DIAGNOSIS — R07.9 CHEST PAIN, UNSPECIFIED TYPE: Primary | ICD-10-CM

## 2025-07-05 LAB
ALBUMIN SERPL-MCNC: 3.5 G/DL (ref 3.5–5)
ALBUMIN/GLOB SERPL: 1.1 (ref 1.1–2.2)
ALP SERPL-CCNC: 88 U/L (ref 45–117)
ALT SERPL-CCNC: 31 U/L (ref 12–78)
ANION GAP SERPL CALC-SCNC: 10 MMOL/L (ref 2–12)
AST SERPL W P-5'-P-CCNC: 15 U/L (ref 15–37)
BASOPHILS # BLD: 0.03 K/UL (ref 0–0.1)
BASOPHILS NFR BLD: 0.5 % (ref 0–1)
BILIRUB SERPL-MCNC: 0.4 MG/DL (ref 0.2–1)
BUN SERPL-MCNC: 10 MG/DL (ref 6–20)
BUN/CREAT SERPL: 11 (ref 12–20)
CA-I BLD-MCNC: 8.7 MG/DL (ref 8.5–10.1)
CHLORIDE SERPL-SCNC: 104 MMOL/L (ref 97–108)
CO2 SERPL-SCNC: 25 MMOL/L (ref 21–32)
CREAT SERPL-MCNC: 0.9 MG/DL (ref 0.55–1.02)
D DIMER PPP FEU-MCNC: 0.2 MG/L FEU (ref 0.19–0.5)
DIFFERENTIAL METHOD BLD: NORMAL
EOSINOPHIL # BLD: 0.12 K/UL (ref 0–0.4)
EOSINOPHIL NFR BLD: 1.8 % (ref 0–7)
ERYTHROCYTE [DISTWIDTH] IN BLOOD BY AUTOMATED COUNT: 12.9 % (ref 11.5–14.5)
GLOBULIN SER CALC-MCNC: 3.1 G/DL (ref 2–4)
GLUCOSE SERPL-MCNC: 102 MG/DL (ref 65–100)
HCT VFR BLD AUTO: 38.1 % (ref 35–47)
HGB BLD-MCNC: 12.5 G/DL (ref 11.5–16)
IMM GRANULOCYTES # BLD AUTO: 0.02 K/UL (ref 0–0.04)
IMM GRANULOCYTES NFR BLD AUTO: 0.3 % (ref 0–0.5)
LYMPHOCYTES # BLD: 1.18 K/UL (ref 0.8–3.5)
LYMPHOCYTES NFR BLD: 18.1 % (ref 12–49)
MCH RBC QN AUTO: 32.1 PG (ref 26–34)
MCHC RBC AUTO-ENTMCNC: 32.8 G/DL (ref 30–36.5)
MCV RBC AUTO: 97.7 FL (ref 80–99)
MONOCYTES # BLD: 0.53 K/UL (ref 0–1)
MONOCYTES NFR BLD: 8.1 % (ref 5–13)
NEUTS SEG # BLD: 4.64 K/UL (ref 1.8–8)
NEUTS SEG NFR BLD: 71.2 % (ref 32–75)
PLATELET # BLD AUTO: 164 K/UL (ref 150–400)
PMV BLD AUTO: 10.1 FL (ref 8.9–12.9)
POTASSIUM SERPL-SCNC: 3.7 MMOL/L (ref 3.5–5.1)
PROT SERPL-MCNC: 6.6 G/DL (ref 6.4–8.2)
RBC # BLD AUTO: 3.9 M/UL (ref 3.8–5.2)
SODIUM SERPL-SCNC: 139 MMOL/L (ref 136–145)
TROPONIN I SERPL HS-MCNC: 4 NG/L (ref 0–51)
TROPONIN I SERPL HS-MCNC: 5 NG/L (ref 0–51)
WBC # BLD AUTO: 6.5 K/UL (ref 3.6–11)

## 2025-07-05 PROCEDURE — 84484 ASSAY OF TROPONIN QUANT: CPT

## 2025-07-05 PROCEDURE — 93005 ELECTROCARDIOGRAM TRACING: CPT | Performed by: EMERGENCY MEDICINE

## 2025-07-05 PROCEDURE — 85379 FIBRIN DEGRADATION QUANT: CPT

## 2025-07-05 PROCEDURE — 85025 COMPLETE CBC W/AUTO DIFF WBC: CPT

## 2025-07-05 PROCEDURE — 36415 COLL VENOUS BLD VENIPUNCTURE: CPT

## 2025-07-05 PROCEDURE — 2580000003 HC RX 258: Performed by: EMERGENCY MEDICINE

## 2025-07-05 PROCEDURE — 80053 COMPREHEN METABOLIC PANEL: CPT

## 2025-07-05 PROCEDURE — 99285 EMERGENCY DEPT VISIT HI MDM: CPT

## 2025-07-05 PROCEDURE — 71045 X-RAY EXAM CHEST 1 VIEW: CPT

## 2025-07-05 RX ORDER — 0.9 % SODIUM CHLORIDE 0.9 %
1000 INTRAVENOUS SOLUTION INTRAVENOUS
Status: COMPLETED | OUTPATIENT
Start: 2025-07-05 | End: 2025-07-05

## 2025-07-05 RX ADMIN — SODIUM CHLORIDE 1000 ML: 0.9 INJECTION, SOLUTION INTRAVENOUS at 14:10

## 2025-07-05 ASSESSMENT — LIFESTYLE VARIABLES
HOW OFTEN DO YOU HAVE A DRINK CONTAINING ALCOHOL: MONTHLY OR LESS
HOW MANY STANDARD DRINKS CONTAINING ALCOHOL DO YOU HAVE ON A TYPICAL DAY: 1 OR 2

## 2025-07-05 ASSESSMENT — PAIN SCALES - GENERAL
PAINLEVEL_OUTOF10: 5
PAINLEVEL_OUTOF10: 0

## 2025-07-05 ASSESSMENT — PAIN - FUNCTIONAL ASSESSMENT: PAIN_FUNCTIONAL_ASSESSMENT: 0-10

## 2025-07-05 NOTE — ED NOTES
Orthostatic VS  Supine: 64  126/77  Sittin  127/85  Standin  131/91  Dizzy when sitting up and standing

## 2025-07-05 NOTE — ED TRIAGE NOTES
Pt reports she has been having chest discomfort, HA, lightheadedness, and nausea since yesterday.     Denies SOB.

## 2025-07-05 NOTE — DISCHARGE INSTRUCTIONS
Thank you for choosing our Emergency Department for your care.  It is our privilege to care for you in your time of need.  In the next several days, you may receive a survey via email or mailed to your home about your experience with our team.  We would greatly appreciate you taking a few minutes to complete the survey, as we use this information to learn what we have done well and what we could be doing better. Thank you for trusting us with your care!    Below you will find a list of your tests from today's visit.   Labs and Radiology Studies  Recent Results (from the past 12 hours)   CBC with Auto Differential    Collection Time: 07/05/25  1:15 PM   Result Value Ref Range    WBC 6.5 3.6 - 11.0 K/uL    RBC 3.90 3.80 - 5.20 M/uL    Hemoglobin 12.5 11.5 - 16.0 g/dL    Hematocrit 38.1 35.0 - 47.0 %    MCV 97.7 80.0 - 99.0 FL    MCH 32.1 26.0 - 34.0 PG    MCHC 32.8 30.0 - 36.5 g/dL    RDW 12.9 11.5 - 14.5 %    Platelets 164 150 - 400 K/uL    MPV 10.1 8.9 - 12.9 FL    Neutrophils % 71.2 32.0 - 75.0 %    Lymphocytes % 18.1 12.0 - 49.0 %    Monocytes % 8.1 5.0 - 13.0 %    Eosinophils % 1.8 0.0 - 7.0 %    Basophils % 0.5 0.0 - 1.0 %    Immature Granulocytes % 0.3 0.0 - 0.5 %    Neutrophils Absolute 4.64 1.80 - 8.00 K/UL    Lymphocytes Absolute 1.18 0.80 - 3.50 K/UL    Monocytes Absolute 0.53 0.00 - 1.00 K/UL    Eosinophils Absolute 0.12 0.00 - 0.40 K/UL    Basophils Absolute 0.03 0.00 - 0.10 K/UL    Immature Granulocytes Absolute 0.02 0.00 - 0.04 K/UL    Differential Type AUTOMATED     Troponin    Collection Time: 07/05/25  1:15 PM   Result Value Ref Range    Troponin, High Sensitivity 4 0 - 51 ng/L   Comprehensive Metabolic Panel    Collection Time: 07/05/25  1:15 PM   Result Value Ref Range    Sodium 139 136 - 145 mmol/L    Potassium 3.7 3.5 - 5.1 mmol/L    Chloride 104 97 - 108 mmol/L    CO2 25 21 - 32 mmol/L    Anion Gap 10 2 - 12 mmol/L    Glucose 102 (H) 65 - 100 mg/dL    BUN 10 6 - 20 mg/dL    Creatinine 0.90  have any questions or reservations about taking the medication due to side effects or interactions with other medications, please call your primary care provider or contact us directly.  Again, THANK YOU for choosing us to care for YOU!

## 2025-07-06 LAB
EKG ATRIAL RATE: 56 BPM
EKG DIAGNOSIS: NORMAL
EKG P AXIS: 46 DEGREES
EKG P-R INTERVAL: 176 MS
EKG Q-T INTERVAL: 476 MS
EKG QRS DURATION: 92 MS
EKG QTC CALCULATION (BAZETT): 459 MS
EKG R AXIS: 9 DEGREES
EKG T AXIS: 23 DEGREES
EKG VENTRICULAR RATE: 56 BPM

## 2025-07-07 LAB
EKG ATRIAL RATE: 66 BPM
EKG DIAGNOSIS: NORMAL
EKG P AXIS: 34 DEGREES
EKG P-R INTERVAL: 162 MS
EKG Q-T INTERVAL: 412 MS
EKG QRS DURATION: 90 MS
EKG QTC CALCULATION (BAZETT): 431 MS
EKG R AXIS: 2 DEGREES
EKG T AXIS: 15 DEGREES
EKG VENTRICULAR RATE: 66 BPM

## 2025-07-10 ENCOUNTER — OFFICE VISIT (OUTPATIENT)
Dept: PRIMARY CARE CLINIC | Facility: CLINIC | Age: 35
End: 2025-07-10
Payer: COMMERCIAL

## 2025-07-10 VITALS
RESPIRATION RATE: 16 BRPM | SYSTOLIC BLOOD PRESSURE: 112 MMHG | BODY MASS INDEX: 27.43 KG/M2 | HEIGHT: 68 IN | DIASTOLIC BLOOD PRESSURE: 76 MMHG | WEIGHT: 181 LBS | HEART RATE: 93 BPM | OXYGEN SATURATION: 98 %

## 2025-07-10 DIAGNOSIS — R00.2 PALPITATIONS: Primary | ICD-10-CM

## 2025-07-10 DIAGNOSIS — R07.9 CHEST PAIN, UNSPECIFIED TYPE: ICD-10-CM

## 2025-07-10 DIAGNOSIS — R51.9 NONINTRACTABLE HEADACHE, UNSPECIFIED CHRONICITY PATTERN, UNSPECIFIED HEADACHE TYPE: ICD-10-CM

## 2025-07-10 PROCEDURE — 99214 OFFICE O/P EST MOD 30 MIN: CPT | Performed by: NURSE PRACTITIONER

## 2025-07-10 SDOH — ECONOMIC STABILITY: FOOD INSECURITY: WITHIN THE PAST 12 MONTHS, YOU WORRIED THAT YOUR FOOD WOULD RUN OUT BEFORE YOU GOT MONEY TO BUY MORE.: NEVER TRUE

## 2025-07-10 SDOH — ECONOMIC STABILITY: FOOD INSECURITY: WITHIN THE PAST 12 MONTHS, THE FOOD YOU BOUGHT JUST DIDN'T LAST AND YOU DIDN'T HAVE MONEY TO GET MORE.: NEVER TRUE

## 2025-07-10 ASSESSMENT — PATIENT HEALTH QUESTIONNAIRE - PHQ9
SUM OF ALL RESPONSES TO PHQ QUESTIONS 1-9: 0
2. FEELING DOWN, DEPRESSED OR HOPELESS: NOT AT ALL
SUM OF ALL RESPONSES TO PHQ QUESTIONS 1-9: 0
1. LITTLE INTEREST OR PLEASURE IN DOING THINGS: NOT AT ALL

## 2025-07-10 ASSESSMENT — ENCOUNTER SYMPTOMS: SHORTNESS OF BREATH: 0

## 2025-07-10 NOTE — PROGRESS NOTES
Have you been to the ER, urgent care clinic since your last visit?  Hospitalized since your last visit?   Yes chest pain [facility]   Have you seen or consulted any other health care providers outside our system since your last visit?   NO     “Have you had a pap smear?”    NO    No cervical cancer screening on file     Chief Complaint   Patient presents with    Follow-up     /76 (BP Site: Right Upper Arm, Patient Position: Sitting, BP Cuff Size: Large Adult)   Pulse 93   Resp 16   Ht 1.727 m (5' 8\")   Wt 82.1 kg (181 lb)   LMP 06/26/2025 (Approximate)   SpO2 98%   BMI 27.52 kg/m²

## 2025-07-10 NOTE — PROGRESS NOTES
Gisselle Herr is a 35 y.o. female presents for    Chief Complaint   Patient presents with    Follow-up    .    ASSESSMENT and PLAN  Gisselle was seen today for follow-up.    Diagnoses and all orders for this visit:    Palpitations  -     Garrett Parra MD, Cardiology, [place]    Chest pain, unspecified type  Comments:  refer to cardiology  Orders:  -     Garrett Parra MD, Cardiology, [place]    Nonintractable headache, unspecified chronicity pattern, unspecified headache type  Comments:  Possibly related to dehydration vs caffeine withdrawal. Discussed increasing fluid intake, aiming for 80oz per day. Will follow up 3 weeks or sooner if they worsen. Red flag sx reviewed.             HISTORY OF PRESENT ILLNESS  Gisselle Herr is a 35 y.o. female presents for    Chief Complaint   Patient presents with    Follow-up    .    Patient reports she started with left side chest pain on 7/5. She reports it hurts into her back. She reports on 7/5 she was having lightheadedness and nausea, so she went to the ED. She had labs, EKG and chest xray all were normal. She does have palpitations. She reports palpitations are twice a week, don't last long and go away on their own. Reports she does drink a lot of caffeine, but has been trying to reduce that.   Reports anxiety is well controlled right now.   She saw cardiologist when she was pregnant with her daughter in 2021 for palpitations.     Patient reports she has been having a lot of headaches. She reports she has one daily. They have been going on for one week. Reports it starts in the back of her head and radiates to her eyes. No vision changes. She takes tylenol which usually makes it go away but if not she will take Excedrin. She drinks propel - 4 a day.     Vitals:    07/10/25 1000   BP: 112/76   BP Site: Right Upper Arm   Patient Position: Sitting   BP Cuff Size: Large Adult   Pulse: 93   Resp: 16   TempSrc: Oral   SpO2: 98%

## 2025-07-11 NOTE — ED PROVIDER NOTES
[facility] EMERGENCY DEPT  EMERGENCY DEPARTMENT HISTORY AND PHYSICAL EXAM      Date of evaluation: 7/5/2025  Patient Name: Gisselle Herr  Birthdate 1990  MRN: [id number]  ED Provider: Sylvia Mann MD   Note Started: 12:30 PM EDT 7/11/25    HISTORY OF PRESENT ILLNESS     Chief Complaint   Patient presents with    Chest Pain    Lightheadedness    Nausea       History Provided By: Patient, only     HPI: Gisselle Herr is a 35 y.o. female presents with chest discomfort, left sided  non radiating,  with associated light-headedness (without modifying factors), a mild diffuse HA, and nausea that began yesterday.    PAST MEDICAL HISTORY   Past Medical History:  Past Medical History:   Diagnosis Date    Anxiety     Asthma     last needed inhaler two months ago.    Clotting disorder     Thrombosis    Depression     Zoloft 50mg daily    Headache     Herpes simplex virus (HSV) infection        Past Surgical History:  Past Surgical History:   Procedure Laterality Date    LEEP  2017    OTHER SURGICAL HISTORY      Tonsillectomy/adnoidectomy    TONSILLECTOMY      UPPER GASTROINTESTINAL ENDOSCOPY N/A 03/12/2024    ESOPHAGOGASTRODUODENOSCOPY performed by Meek Hilton MD at [facility] ENDOSCOPY       Family History:  Family History   Problem Relation Age of Onset    Other Other         Chronic Obstructive pulmonary diseas     Stroke Other     Heart Disease Other     Hypertension Other     Anemia Other     Anxiety Disorder Other     Diabetes Other     Heart Disease Mother     High Blood Pressure Father     Stroke Father     Vision Loss Father     Early Death Maternal Grandfather     Heart Attack Maternal Grandfather     Diabetes Maternal Grandmother     Diabetes Paternal Grandfather     Prostate Cancer Paternal Grandfather     Anemia Paternal Grandmother     Atrial Fibrillation Paternal Grandmother     Cancer Paternal Grandmother        Social History:  Social History     Tobacco Use    Smoking status: Former     Current  for tilt table and evaluation for POTS. She endorsed understanding. [LG]   1632 D D [LG]   1632 D Dimer negative (0.20) making PE unlikely given Wells score. [LG]      ED Course User Index  [LG] Sylvia Mann MD       Clinical Management Tools:  Not Applicable, Dizziness: MEDICAL DECISION MAKING:   I considered, but did not perform, additional testing such a CT stroke and a Brain MRI, as well as admission or transfer to a higher level of care.     I utilized an evidence-based risk rating tool (CMT) along with my training and experience to weigh the risk of discharge against the risks of further testing, imaging, or hospitalization. At this time, I estimate the risks of additional testing, imaging, or hospitalization (in the case of discharge home) to be equal to or greater than the risk of discharge.       I have performed and NIHSS exam, and it is 0 with no cerebellar findings. The chance of missing a stroke is less than 1%. MRI at this time would not benefit the patient. MRI is higher risk than performing a NIHSS, and MRI is not as sensitive for acute stroke. With an NIHSS of 0 and no cerebellar findings, acute interventions such as thrombolytics, angiogram, or new anticoagulation most likely have more risk than benefit. [id number]     SHARED DECISION MAKING:   I discussed my risk assessment with the patient. The patient understands and consents to the risk of disposition/plan, as well as the risk of uncertainty in estimating outcomes.  [id number]          Smoking Cessation: Not Applicable    Patient was given the following medications:  Medications   sodium chloride 0.9 % bolus 1,000 mL (0 mLs IntraVENous Stopped 7/5/25 [id number])       CONSULTS: See ED Course/MDM for further details.  None   PROCEDURES   Unless otherwise noted above, none  Procedures    SEPSIS REASSESSMENT & CRITICAL CARE TIME   SEPSIS REASSESSMENT: Patient does NOT meet Sepsis criteria after ED workup    Patient does not meet

## 2025-08-11 DIAGNOSIS — F41.1 GENERALIZED ANXIETY DISORDER: ICD-10-CM

## (undated) DEVICE — FORCEPS BX L240CM JAW DIA2.8MM L CAP W/ NDL MIC MESH TOOTH